# Patient Record
Sex: FEMALE | Race: BLACK OR AFRICAN AMERICAN | Employment: UNEMPLOYED | URBAN - METROPOLITAN AREA
[De-identification: names, ages, dates, MRNs, and addresses within clinical notes are randomized per-mention and may not be internally consistent; named-entity substitution may affect disease eponyms.]

---

## 2017-02-09 RX ORDER — METOPROLOL SUCCINATE 100 MG/1
100 TABLET, EXTENDED RELEASE ORAL DAILY
Qty: 30 TAB | Refills: 3 | Status: SHIPPED | OUTPATIENT
Start: 2017-02-09 | End: 2017-04-27 | Stop reason: SDUPTHER

## 2017-04-27 RX ORDER — METOPROLOL SUCCINATE 100 MG/1
TABLET, EXTENDED RELEASE ORAL
Qty: 30 TAB | Refills: 0 | Status: SHIPPED | OUTPATIENT
Start: 2017-04-27 | End: 2017-06-05 | Stop reason: SDUPTHER

## 2017-06-05 RX ORDER — METOPROLOL SUCCINATE 100 MG/1
100 TABLET, EXTENDED RELEASE ORAL DAILY
Qty: 30 TAB | Refills: 12 | Status: SHIPPED | OUTPATIENT
Start: 2017-06-05 | End: 2018-04-09 | Stop reason: ALTCHOICE

## 2017-10-02 VITALS
HEIGHT: 63 IN | DIASTOLIC BLOOD PRESSURE: 115 MMHG | RESPIRATION RATE: 18 BRPM | SYSTOLIC BLOOD PRESSURE: 151 MMHG | TEMPERATURE: 97.7 F | HEART RATE: 84 BPM | BODY MASS INDEX: 51.91 KG/M2 | OXYGEN SATURATION: 100 % | WEIGHT: 293 LBS

## 2017-10-02 LAB
APPEARANCE UR: ABNORMAL
BACTERIA URNS QL MICRO: ABNORMAL /HPF
BILIRUB UR QL: NEGATIVE
COLOR UR: ABNORMAL
EPITH CASTS URNS QL MICRO: ABNORMAL /LPF
GLUCOSE UR STRIP.AUTO-MCNC: NEGATIVE MG/DL
HCG UR QL: NEGATIVE
HGB UR QL STRIP: ABNORMAL
KETONES UR QL STRIP.AUTO: NEGATIVE MG/DL
LEUKOCYTE ESTERASE UR QL STRIP.AUTO: ABNORMAL
NITRITE UR QL STRIP.AUTO: POSITIVE
PH UR STRIP: 6 [PH] (ref 5–8)
PROT UR STRIP-MCNC: NEGATIVE MG/DL
RBC #/AREA URNS HPF: ABNORMAL /HPF (ref 0–5)
SP GR UR REFRACTOMETRY: 1.03 (ref 1–1.03)
UA: UC IF INDICATED,UAUC: ABNORMAL
UROBILINOGEN UR QL STRIP.AUTO: 1 EU/DL (ref 0.2–1)
WBC URNS QL MICRO: ABNORMAL /HPF (ref 0–4)

## 2017-10-02 PROCEDURE — 81025 URINE PREGNANCY TEST: CPT | Performed by: PHYSICIAN ASSISTANT

## 2017-10-02 PROCEDURE — 99284 EMERGENCY DEPT VISIT MOD MDM: CPT

## 2017-10-02 PROCEDURE — 87086 URINE CULTURE/COLONY COUNT: CPT | Performed by: PHYSICIAN ASSISTANT

## 2017-10-02 PROCEDURE — 87077 CULTURE AEROBIC IDENTIFY: CPT | Performed by: PHYSICIAN ASSISTANT

## 2017-10-02 PROCEDURE — 87186 SC STD MICRODIL/AGAR DIL: CPT | Performed by: PHYSICIAN ASSISTANT

## 2017-10-02 PROCEDURE — 81001 URINALYSIS AUTO W/SCOPE: CPT | Performed by: PHYSICIAN ASSISTANT

## 2017-10-03 ENCOUNTER — HOSPITAL ENCOUNTER (EMERGENCY)
Age: 32
Discharge: HOME | End: 2017-10-03
Attending: EMERGENCY MEDICINE
Payer: MEDICARE

## 2017-10-03 DIAGNOSIS — B96.89 BV (BACTERIAL VAGINOSIS): Primary | ICD-10-CM

## 2017-10-03 DIAGNOSIS — N30.01 ACUTE CYSTITIS WITH HEMATURIA: ICD-10-CM

## 2017-10-03 DIAGNOSIS — N76.0 BV (BACTERIAL VAGINOSIS): Primary | ICD-10-CM

## 2017-10-03 DIAGNOSIS — F17.200 NICOTINE DEPENDENCE, UNCOMPLICATED, UNSPECIFIED NICOTINE PRODUCT TYPE: ICD-10-CM

## 2017-10-03 LAB
CLUE CELLS VAG QL WET PREP: NORMAL
KOH PREP SPEC: NORMAL
SERVICE CMNT-IMP: NORMAL
T VAGINALIS VAG QL WET PREP: NORMAL

## 2017-10-03 PROCEDURE — 87491 CHLMYD TRACH DNA AMP PROBE: CPT | Performed by: PHYSICIAN ASSISTANT

## 2017-10-03 PROCEDURE — 87210 SMEAR WET MOUNT SALINE/INK: CPT | Performed by: PHYSICIAN ASSISTANT

## 2017-10-03 PROCEDURE — 74011250637 HC RX REV CODE- 250/637: Performed by: PHYSICIAN ASSISTANT

## 2017-10-03 PROCEDURE — 74011250637 HC RX REV CODE- 250/637

## 2017-10-03 RX ORDER — PHENAZOPYRIDINE HYDROCHLORIDE 200 MG/1
200 TABLET, FILM COATED ORAL 3 TIMES DAILY
Qty: 6 TAB | Refills: 0 | Status: SHIPPED | OUTPATIENT
Start: 2017-10-03 | End: 2017-10-05

## 2017-10-03 RX ORDER — PHENAZOPYRIDINE HYDROCHLORIDE 100 MG/1
TABLET, FILM COATED ORAL
Status: COMPLETED
Start: 2017-10-03 | End: 2017-10-03

## 2017-10-03 RX ORDER — METRONIDAZOLE 500 MG/1
500 TABLET ORAL 2 TIMES DAILY
Qty: 14 TAB | Refills: 0 | Status: SHIPPED | OUTPATIENT
Start: 2017-10-03 | End: 2018-05-01

## 2017-10-03 RX ORDER — METRONIDAZOLE 250 MG/1
500 TABLET ORAL
Status: COMPLETED | OUTPATIENT
Start: 2017-10-03 | End: 2017-10-03

## 2017-10-03 RX ORDER — CEPHALEXIN 250 MG/1
CAPSULE ORAL
Status: COMPLETED
Start: 2017-10-03 | End: 2017-10-03

## 2017-10-03 RX ORDER — CEPHALEXIN 500 MG/1
500 CAPSULE ORAL 2 TIMES DAILY
Qty: 10 CAP | Refills: 0 | Status: SHIPPED | OUTPATIENT
Start: 2017-10-03 | End: 2018-06-11 | Stop reason: ALTCHOICE

## 2017-10-03 RX ORDER — CEPHALEXIN 250 MG/1
500 CAPSULE ORAL
Status: COMPLETED | OUTPATIENT
Start: 2017-10-03 | End: 2017-10-03

## 2017-10-03 RX ORDER — PHENAZOPYRIDINE HYDROCHLORIDE 100 MG/1
200 TABLET, FILM COATED ORAL
Status: COMPLETED | OUTPATIENT
Start: 2017-10-03 | End: 2017-10-03

## 2017-10-03 RX ADMIN — PHENAZOPYRIDINE HYDROCHLORIDE 200 MG: 100 TABLET, FILM COATED ORAL at 01:22

## 2017-10-03 RX ADMIN — PHENAZOPYRIDINE HYDROCHLORIDE 200 MG: 100 TABLET ORAL at 01:22

## 2017-10-03 RX ADMIN — METRONIDAZOLE 500 MG: 250 TABLET ORAL at 01:41

## 2017-10-03 RX ADMIN — CEPHALEXIN 500 MG: 250 CAPSULE ORAL at 01:23
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
## 2017-10-03 NOTE — ED NOTES
Pt given discharge instructions by Zay Perrin. Discharged ambulatory with steady gait. No acute distress at time of discharge. Accompanied by female .

## 2017-10-04 LAB
C TRACH DNA SPEC QL NAA+PROBE: NEGATIVE
N GONORRHOEA DNA SPEC QL NAA+PROBE: NEGATIVE
SAMPLE TYPE: NORMAL
SERVICE CMNT-IMP: NORMAL
SPECIMEN SOURCE: NORMAL

## 2017-10-05 LAB
BACTERIA SPEC CULT: ABNORMAL
BACTERIA SPEC CULT: ABNORMAL
CC UR VC: ABNORMAL
SERVICE CMNT-IMP: ABNORMAL

## 2018-04-09 ENCOUNTER — OFFICE VISIT (OUTPATIENT)
Dept: CARDIOLOGY CLINIC | Age: 33
End: 2018-04-09

## 2018-04-09 VITALS
SYSTOLIC BLOOD PRESSURE: 197 MMHG | HEART RATE: 90 BPM | RESPIRATION RATE: 12 BRPM | HEIGHT: 63 IN | DIASTOLIC BLOOD PRESSURE: 114 MMHG | OXYGEN SATURATION: 98 % | WEIGHT: 293 LBS | BODY MASS INDEX: 51.91 KG/M2

## 2018-04-09 DIAGNOSIS — E87.6 CHRONIC HYPOKALEMIA: Primary | ICD-10-CM

## 2018-04-09 DIAGNOSIS — R00.2 PALPITATIONS: ICD-10-CM

## 2018-04-09 DIAGNOSIS — R42 LIGHTHEADEDNESS: ICD-10-CM

## 2018-04-09 DIAGNOSIS — E11.8 TYPE 2 DIABETES MELLITUS WITH COMPLICATION, UNSPECIFIED WHETHER LONG TERM INSULIN USE: ICD-10-CM

## 2018-04-09 DIAGNOSIS — I10 ESSENTIAL HYPERTENSION: ICD-10-CM

## 2018-04-09 DIAGNOSIS — R06.09 DOE (DYSPNEA ON EXERTION): ICD-10-CM

## 2018-04-09 RX ORDER — LABETALOL 200 MG/1
200 TABLET, FILM COATED ORAL 2 TIMES DAILY
Qty: 60 TAB | Refills: 1 | Status: SHIPPED | OUTPATIENT
Start: 2018-04-09 | End: 2018-04-10 | Stop reason: SDUPTHER

## 2018-04-09 NOTE — MR AVS SNAPSHOT
[address] [facility] 
 
 
 [address] [facility] 
[phone] Patient: Arlen Cheema MRN: [id number] :1985 Visit Information Date & Time Provider Department Dept. Phone Encounter #  
 2018  9:30 AM [facility] [place] [address] [id number] Upcoming Health Maintenance Date Due  
 FOOT EXAM Q1 3/28/1995 EYE EXAM RETINAL OR DILATED Q1 3/28/1995 DTaP/Tdap/Td series (2 - Tdap) 3/28/2006 PAP AKA CERVICAL CYTOLOGY 3/28/2006 MICROALBUMIN Q1 10/3/2013 HEMOGLOBIN A1C Q6M 2015 LIPID PANEL Q1 2016 Influenza Age 5 to Adult 2017 MEDICARE YEARLY EXAM 3/14/2018 Allergies as of 2018  Review Complete On: 2018 By: Edmundo Madera PA-C Severity Noted Reaction Type Reactions Aspirin High 2010   Side Effect Rash, Nausea and Vomiting, Myalgia Vicodin [Hydrocodone-acetaminophen] High 2013    Anaphylaxis Flexeril [Cyclobenzaprine] Medium 2010   Side Effect Nausea and Vomiting Ibuprofen  2012    Rash Ivp [Fd And C Blue No.1]  2010   Side Effect Itching Pcn [Penicillins]  2010    Rash Pt states she is allergic to all \"cillins\" Toradol [Ketorolac Tromethamine]  2010    Rash Ultram [Tramadol]  07/10/2014   Intolerance Nausea and Vomiting Pt reports headache with n/v when taking this med. Current Immunizations  Reviewed on 2015 Name Date Influenza Vaccine PF 2015  3:55 PM  
 Influenza Vaccine Whole 10/26/2009 Pneumococcal Polysaccharide (PPSV-23) 2015  3:54 PM  
 TD Vaccine 2004 Not reviewed this visit You Were Diagnosed With   
  
 Codes Comments Chronic hypokalemia    -  Primary ICD-10-CM: E87.6 ICD-9-CM: 276.8 Essential hypertension     ICD-10-CM: I10 
ICD-9-CM: 401.9 Type 2 diabetes mellitus with complication, unspecified whether long term insulin use (HCC)     ICD-10-CM: E11.8 ICD-9-CM: 250.90 Palpitations     ICD-10-CM: R00.2 ICD-9-CM: 785.1 Lightheadedness     ICD-10-CM: P55 ICD-9-CM: 780.4 LAMB (dyspnea on exertion)     ICD-10-CM: R06.09 
ICD-9-CM: 786.09 Vitals BP Pulse Resp Height(growth percentile) Weight(growth percentile) SpO2  
 (!) 197/114 (BP 1 Location: Right arm, BP Patient Position: Sitting) 90 12 5' 3\" (1.6 m) 318 lb (144.2 kg) 98% BMI OB Status Smoking Status 56.33 kg/m2 Having regular periods Current Every Day Smoker Vitals History BMI and BSA Data Body Mass Index Body Surface Area  
 56.33 kg/m 2 2.53 m 2 Preferred Pharmacy Pharmacy Name Phone [facility] [phone] Your Updated Medication List  
  
   
This list is accurate as of 4/9/18 10:40 AM.  Always use your most recent med list.  
  
  
  
  
 acyclovir 400 mg tablet Commonly known as:  ZOVIRAX Take 800 mg by mouth three (3) times daily. albuterol 90 mcg/actuation inhaler Commonly known as:  PROVENTIL HFA, VENTOLIN HFA, PROAIR HFA Take 1-2 puffs by inhalation every four (4) hours as needed for Wheezing. ALPRAZolam 2 mg tablet Commonly known as:  Laymond Carrie Take 0.5 tablets by mouth every twelve (12) hours as needed for Anxiety. cephALEXin 500 mg capsule Commonly known as:  Ralene Plum Take 1 Cap by mouth two (2) times a day. cloNIDine HCl 0.2 mg tablet Commonly known as:  CATAPRES Take 1 tablet by mouth three (3) times daily. fluticasone-salmeterol 100-50 mcg/dose diskus inhaler Commonly known as:  ADVAIR Take 1 puff by inhalation every twelve (12) hours. insulin aspart U-100 100 unit/mL Inpn Commonly known as:  Pj Peer Use as directed up to 4 times daily per sliding scale: 150-200: 2 units, 201-250: 4 units, 251-300: 6 units, 301-350: 8 units, 351-400: 10 units  
  
 ketoconazole 2 % topical cream  
Commonly known as:  NIZORAL Apply  to affected area daily. LEVEMIR U-100 INSULIN 100 unit/mL injection Generic drug:  insulin detemir U-100  
23 Units by SubCUTAneous route nightly. levETIRAcetam 100 mg/ml Soln oral solution Commonly known as:  KEPPRA Take 15 mL by mouth two (2) times a day. LIDODERM 5 % Generic drug:  lidocaine  
by TransDERmal route every twenty-four (24) hours. Apply patch to the affected area for 12 hours a day and remove for 12 hours a day. loratadine 10 mg Cap Take  by mouth daily. metoprolol succinate 100 mg tablet Commonly known as:  TOPROL-XL Take 1 Tab by mouth daily. metroNIDAZOLE 500 mg tablet Commonly known as:  FLAGYL Take 1 Tab by mouth two (2) times a day. MULTI VITAMIN PO Take  by mouth. ondansetron 4 mg disintegrating tablet Commonly known as:  ZOFRAN ODT Take 1 Tab by mouth every eight (8) hours as needed for Nausea for up to 5 doses. promethazine 25 mg tablet Commonly known as:  PHENERGAN Take 1 Tab by mouth every six (6) hours as needed. We Performed the Following AMB POC EKG ROUTINE W/ 12 LEADS, INTER & REP [87243 CPT(R)] To-Do List   
 04/09/2018 ECHO:  2D ECHO COMPLETE ADULT (TTE) W OR WO CONTR   
  
 04/09/2018 ECG:  ECG EVENT RECORD MONITORING SET-UP Patient Instructions   
-- We will get an echo and event monitor -- Please make a follow up appointment for 5 weeks Cardiac Event Monitoring: About This Test 
What is it? Cardiac event monitoring is a test that records the electrical activity of your heart. The test is done with a heart monitor device that you may wear or keep with you for up to a month. This test may be done to find out why you're having symptoms.  Or it may be done to look for heartbeats that are too fast, too slow, or irregular. These are cardiac events. The monitor will give your doctor information similar to an electrocardiogram (EKG or ECG). An EKG translates the heart's electrical activity into line tracings on paper. There are different types of monitors. Your doctor will choose the type that works best for you and is most likely to help diagnose your heart problem. These monitors are safe to use. No electricity is sent through your body. So there is no chance of getting an electric shock. Why is this test done? This test is used to look for irregular heartbeats. It can help your doctor find out what is causing chest pain, fainting, lightheadedness, or other symptoms of heart problems. It also can help the doctor check to see if treatment for an irregular heartbeat is working. Many people have irregular heartbeats from time to time. Because these kinds of heartbeats can come and go, it may be hard to record one while you are in the doctor's office. Monitoring your heart for a longer time and during your normal activities makes it easier to capture your cardiac events. What happens before the test? 
If you are getting a monitor with electrode pads on your chest: 
· Several areas on your chest may be shaved and cleaned. Then a small amount of gel will be put on those areas. The electrode pads will then be attached to the skin of your chest. Thin wires will connect the electrodes to the monitor. · You will get instructions for how and when to change the electrodes at home. Some types of monitors don't use electrode pads. Some types are worn on your wrist like a watch. Others are stuck to your chest with a sticky patch. Or you may have a monitor that you carry with you. Your doctor will explain which type of monitor you have and how to use it.  
What happens during the test? 
· Your monitor may start recording on its own when it detects an abnormal heartbeat. Or you may need to do something to start the recording when you have symptoms. You might use a handheld device to start the monitor. Or you may need to press a button on the monitor itself. Your doctor will explain which type you have and what you need to do. · You may keep a diary of what you were doing when you had symptoms such as chest pain or dizziness. Your doctor will show you how to do this. · You may need to send the information from your monitor to your doctor through a phone line or online. Some monitors send it automatically. You will get instructions from your doctor. Your information will stay private and secure whether you send it or it is sent automatically. · You may be able to do most of the things you usually do. But it's important to follow your doctor's instructions for bathing, exercise, and other daily activities. How long does the test take? You may use the monitor for up to a month or longer. It depends on how long it takes to record irregular heartbeat episodes. It also depends on how long your doctor wants to keep monitoring your heart. What happens after the test? 
· Lynann Bolds return the monitor to your doctor's office or hospital. 
· You'll meet with your doctor to talk about the information recorded during your test. 
· Your doctor will check your diary of symptoms. He or she will compare the timing of your activities and symptoms with the recorded heart pattern. · Depending on your test results, your doctor may talk with you about other tests or treatment options. Follow-up care is a key part of your treatment and safety. Be sure to make and go to all appointments, and call your doctor if you are having problems. It's also a good idea to keep a list of the medicines you take. Ask your doctor when you can expect to have your test results. Where can you learn more? Go to http://emelina-nile.info/. Enter S924 in the search box to learn more about \"Cardiac Event Monitoring: About This Test.\" Current as of: September 21, 2016 Content Version: 11.4 © 6284-1881 Healthwise, Incorporated. Care instructions adapted under license by [employer] (which disclaims liability or warranty for this information). If you have questions about a medical condition or this instruction, always ask your healthcare professional. [address] any warranty or liability for your use of this information. Introducing [facility]! Tatum Snell introduces [employer] patient portal. Now you can access parts of your medical record, email your doctor's office, and request medication refills online. 1. In your internet browser, go to [email or web address] 2. Click on the First Time User? Click Here link in the Sign In box. You will see the New Member Sign Up page. 3. Enter your [employer] Access Code exactly as it appears below. You will not need to use this code after youve completed the sign-up process. If you do not sign up before the expiration date, you must request a new code. · [employer] Access Code: [email or web address] Expires: 7/8/2018 10:39 AM 
 
4. Enter the last four digits of your Social Security Number (xxxx) and Date of Birth (mm/dd/yyyy) as indicated and click Submit. You will be taken to the next sign-up page. 5. Create a [employer] ID. This will be your [employer] login ID and cannot be changed, so think of one that is secure and easy to remember. 6. Create a [employer] password. You can change your password at any time. 7. Enter your Password Reset Question and Answer. This can be used at a later time if you forget your password. 8. Enter your e-mail address. You will receive e-mail notification when new information is available in [address] 9. Click Sign Up. You can now view and download portions of your medical record. 10. Click the Download Summary menu link to download a portable copy of your medical information. If you have questions, please visit the Frequently Asked Questions section of the [employer] website. Remember, [employer] is NOT to be used for urgent needs. For medical emergencies, dial 911. Now available from your iPhone and Android! Please provide this summary of care documentation to your next provider. Your primary care clinician is listed as Nick Moran. If you have any questions after today's visit, please call [phone].

## 2018-04-09 NOTE — PATIENT INSTRUCTIONS
-- We will get an echo and event monitor  -- Please make a follow up appointment for 5 weeks    Cardiac Event Monitoring: About This Test  What is it? Cardiac event monitoring is a test that records the electrical activity of your heart. The test is done with a heart monitor device that you may wear or keep with you for up to a month. This test may be done to find out why you're having symptoms. Or it may be done to look for heartbeats that are too fast, too slow, or irregular. These are cardiac events. The monitor will give your doctor information similar to an electrocardiogram (EKG or ECG). An EKG translates the heart's electrical activity into line tracings on paper. There are different types of monitors. Your doctor will choose the type that works best for you and is most likely to help diagnose your heart problem. These monitors are safe to use. No electricity is sent through your body. So there is no chance of getting an electric shock. Why is this test done? This test is used to look for irregular heartbeats. It can help your doctor find out what is causing chest pain, fainting, lightheadedness, or other symptoms of heart problems. It also can help the doctor check to see if treatment for an irregular heartbeat is working. Many people have irregular heartbeats from time to time. Because these kinds of heartbeats can come and go, it may be hard to record one while you are in the doctor's office. Monitoring your heart for a longer time and during your normal activities makes it easier to capture your cardiac events. What happens before the test?  If you are getting a monitor with electrode pads on your chest:  · Several areas on your chest may be shaved and cleaned. Then a small amount of gel will be put on those areas. The electrode pads will then be attached to the skin of your chest. Thin wires will connect the electrodes to the monitor.   · You will get instructions for how and when to change the electrodes at home. Some types of monitors don't use electrode pads. Some types are worn on your wrist like a watch. Others are stuck to your chest with a sticky patch. Or you may have a monitor that you carry with you. Your doctor will explain which type of monitor you have and how to use it. What happens during the test?  · Your monitor may start recording on its own when it detects an abnormal heartbeat. Or you may need to do something to start the recording when you have symptoms. You might use a handheld device to start the monitor. Or you may need to press a button on the monitor itself. Your doctor will explain which type you have and what you need to do. · You may keep a diary of what you were doing when you had symptoms such as chest pain or dizziness. Your doctor will show you how to do this. · You may need to send the information from your monitor to your doctor through a phone line or online. Some monitors send it automatically. You will get instructions from your doctor. Your information will stay private and secure whether you send it or it is sent automatically. · You may be able to do most of the things you usually do. But it's important to follow your doctor's instructions for bathing, exercise, and other daily activities. How long does the test take? You may use the monitor for up to a month or longer. It depends on how long it takes to record irregular heartbeat episodes. It also depends on how long your doctor wants to keep monitoring your heart. What happens after the test?  · Joyce Ferguson return the monitor to your doctor's office or hospital.  · You'll meet with your doctor to talk about the information recorded during your test.  · Your doctor will check your diary of symptoms. He or she will compare the timing of your activities and symptoms with the recorded heart pattern. · Depending on your test results, your doctor may talk with you about other tests or treatment options.   Follow-up care is a key part of your treatment and safety. Be sure to make and go to all appointments, and call your doctor if you are having problems. It's also a good idea to keep a list of the medicines you take. Ask your doctor when you can expect to have your test results. Where can you learn more? Go to http://emelina-nile.info/. Enter B668 in the search box to learn more about \"Cardiac Event Monitoring: About This Test.\"  Current as of: September 21, 2016  Content Version: 11.4  © 2419-9015 [employer]. Care instructions adapted under license by [employer] (which disclaims liability or warranty for this information). If you have questions about a medical condition or this instruction, always ask your healthcare professional. [address] any warranty or liability for your use of this information.
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
## 2018-04-09 NOTE — PROGRESS NOTES
Chief Complaint   Patient presents with    Chest Pain (Angina)     pt c/o flutters in chest , lethargy     1. Have you been to the ER, urgent care clinic since your last visit? Hospitalized since your last visit?10/2017 abdomen pain    2. Have you seen or consulted any other health care providers outside of the [address] [place] [address] since your last visit? Include any pap smears or colon screening.  Yes When: psych md. Lilliam Elliott; mental health3/18

## 2018-04-10 ENCOUNTER — TELEPHONE (OUTPATIENT)
Dept: CARDIOLOGY CLINIC | Age: 33
End: 2018-04-10

## 2018-04-10 DIAGNOSIS — R00.2 PALPITATIONS: ICD-10-CM

## 2018-04-10 DIAGNOSIS — I10 ESSENTIAL HYPERTENSION: ICD-10-CM

## 2018-04-10 NOTE — TELEPHONE ENCOUNTER
Spoke with pt . Verified 2 identifers. Told pt per Veronica Cardona :stop the Metoprolol and start Labetalol as soon as she can order has been sent to pharmacy so she can  new medication. keep track of her blood pressures and write them down over the next few weeks, before she comes back for follow up, either at home (if she has a blood pressure cuff) or by going into a pharmacy a few times each week, that would be great, bring all her meds and this blood pressure diary with her to the next visit and also to have lab work done at [facility] and orders have been put in system. Patient verbalize understanding .

## 2018-04-10 NOTE — TELEPHONE ENCOUNTER
Called pt to relay message per [facility] pt number disconnected. Called emergency contact number no answer unable to leave voicemail. Spoke with 3rd contact on chart father Basilio Mantilla states he will give her the number to office to return the call .

## 2018-04-11 RX ORDER — LABETALOL 200 MG/1
200 TABLET, FILM COATED ORAL 2 TIMES DAILY
Qty: 60 TAB | Refills: 1 | Status: SHIPPED | OUTPATIENT
Start: 2018-04-11 | End: 2018-12-07

## 2018-04-17 ENCOUNTER — HOSPITAL ENCOUNTER (OUTPATIENT)
Dept: NON INVASIVE DIAGNOSTICS | Age: 33
Discharge: HOME | End: 2018-04-17
Attending: PHYSICIAN ASSISTANT
Payer: MEDICARE

## 2018-04-17 DIAGNOSIS — R42 LIGHTHEADEDNESS: ICD-10-CM

## 2018-04-17 DIAGNOSIS — R00.2 PALPITATIONS: ICD-10-CM

## 2018-04-17 DIAGNOSIS — R06.09 DOE (DYSPNEA ON EXERTION): ICD-10-CM

## 2018-04-17 PROCEDURE — 93270 REMOTE 30 DAY ECG REV/REPORT: CPT

## 2018-04-17 PROCEDURE — C8929 TTE W OR WO FOL WCON,DOPPLER: HCPCS

## 2018-04-17 PROCEDURE — 74011250636 HC RX REV CODE- 250/636

## 2018-04-17 RX ORDER — SODIUM CHLORIDE 0.9 % (FLUSH) 0.9 %
SYRINGE (ML) INJECTION
Status: DISPENSED
Start: 2018-04-17 | End: 2018-04-18

## 2018-04-17 RX ORDER — SODIUM CHLORIDE 0.9 % (FLUSH) 0.9 %
10 SYRINGE (ML) INJECTION AS NEEDED
Status: DISCONTINUED | OUTPATIENT
Start: 2018-04-17 | End: 2018-04-21 | Stop reason: HOSPADM

## 2018-04-17 RX ADMIN — Medication 10 ML: at 14:52

## 2018-04-17 RX ADMIN — Medication 10 ML: at 14:49

## 2018-04-17 RX ADMIN — PERFLUTREN 1.5 ML: 6.52 INJECTION, SUSPENSION INTRAVENOUS at 14:43

## 2018-04-17 RX ADMIN — Medication 10 ML: at 14:51

## 2018-04-17 NOTE — PROGRESS NOTES
Pt given instructions for 30 day event monitor. Noted to return on may 17/2018. Extra supplies given and no further questions noted two transmission sent for baseline. Instruction to go to nearest er if chest pain. nack and soda given

## 2018-04-17 NOTE — PROGRESS NOTES
1.5 ml iv definity after iv started per er tech Jose M Dasilva after two failed attempts by rn. Iv start in left hand site. Lab orders faxed over from Dr Lundy Manifold office given to pt. Iv discontinued tip intact . dsg placed. Pt to lab.

## 2018-04-18 LAB
ALBUMIN SERPL-MCNC: 4.3 G/DL (ref 3.5–5.5)
ALBUMIN/GLOB SERPL: 1.1 {RATIO} (ref 1.2–2.2)
ALP SERPL-CCNC: 89 IU/L (ref 39–117)
ALT SERPL-CCNC: 18 IU/L (ref 0–32)
AST SERPL-CCNC: 12 IU/L (ref 0–40)
BASOPHILS # BLD AUTO: 0 X10E3/UL (ref 0–0.2)
BASOPHILS NFR BLD AUTO: 0 %
BILIRUB SERPL-MCNC: 0.3 MG/DL (ref 0–1.2)
BUN SERPL-MCNC: 9 MG/DL (ref 6–20)
BUN/CREAT SERPL: 15 (ref 9–23)
CALCIUM SERPL-MCNC: 9.5 MG/DL (ref 8.7–10.2)
CHLORIDE SERPL-SCNC: 96 MMOL/L (ref 96–106)
CO2 SERPL-SCNC: 25 MMOL/L (ref 18–29)
CREAT SERPL-MCNC: 0.6 MG/DL (ref 0.57–1)
EOSINOPHIL # BLD AUTO: 0.2 X10E3/UL (ref 0–0.4)
EOSINOPHIL NFR BLD AUTO: 3 %
ERYTHROCYTE [DISTWIDTH] IN BLOOD BY AUTOMATED COUNT: 17.1 % (ref 12.3–15.4)
FT4I SERPL CALC-MCNC: 2 (ref 1.2–4.9)
GFR SERPLBLD CREATININE-BSD FMLA CKD-EPI: 120 ML/MIN/1.73
GFR SERPLBLD CREATININE-BSD FMLA CKD-EPI: 139 ML/MIN/1.73
GLOBULIN SER CALC-MCNC: 3.9 G/DL (ref 1.5–4.5)
GLUCOSE SERPL-MCNC: 128 MG/DL (ref 65–99)
HCT VFR BLD AUTO: 35.7 % (ref 34–46.6)
HGB BLD-MCNC: 11.9 G/DL (ref 11.1–15.9)
IMM GRANULOCYTES # BLD: 0 X10E3/UL (ref 0–0.1)
IMM GRANULOCYTES NFR BLD: 0 %
LYMPHOCYTES # BLD AUTO: 1.8 X10E3/UL (ref 0.7–3.1)
LYMPHOCYTES NFR BLD AUTO: 23 %
MAGNESIUM SERPL-MCNC: 2 MG/DL (ref 1.6–2.3)
MCH RBC QN AUTO: 25.9 PG (ref 26.6–33)
MCHC RBC AUTO-ENTMCNC: 33.3 G/DL (ref 31.5–35.7)
MCV RBC AUTO: 78 FL (ref 79–97)
MONOCYTES # BLD AUTO: 0.6 X10E3/UL (ref 0.1–0.9)
MONOCYTES NFR BLD AUTO: 7 %
NEUTROPHILS # BLD AUTO: 5 X10E3/UL (ref 1.4–7)
NEUTROPHILS NFR BLD AUTO: 67 %
PLATELET # BLD AUTO: 374 X10E3/UL (ref 150–379)
POTASSIUM SERPL-SCNC: 4 MMOL/L (ref 3.5–5.2)
PROT SERPL-MCNC: 8.2 G/DL (ref 6–8.5)
RBC # BLD AUTO: 4.59 X10E6/UL (ref 3.77–5.28)
SODIUM SERPL-SCNC: 141 MMOL/L (ref 134–144)
T3RU NFR SERPL: 27 % (ref 24–39)
T4 SERPL-MCNC: 7.5 UG/DL (ref 4.5–12)
TSH SERPL DL<=0.005 MIU/L-ACNC: 1.3 UIU/ML (ref 0.45–4.5)
WBC # BLD AUTO: 7.6 X10E3/UL (ref 3.4–10.8)

## 2018-04-19 NOTE — PROGRESS NOTES
Spoke with pt . Verified 2 identifers. Told pt per Veronica Cardona :  Labs look good from recent bloodwork drawn Patient verbalize understanding .

## 2018-04-30 NOTE — PROGRESS NOTES
Echo shows changes related to poorly controlled hypertension. Pt should take her blood pressure medications, avoid salt/sodium in her diet, eat a heart healthy diet and get daily exercise. We will see her for follow up as planned.

## 2018-05-01 ENCOUNTER — HOSPITAL ENCOUNTER (EMERGENCY)
Age: 33
Discharge: HOME | End: 2018-05-01
Attending: EMERGENCY MEDICINE
Payer: MEDICARE

## 2018-05-01 ENCOUNTER — APPOINTMENT (OUTPATIENT)
Dept: GENERAL RADIOLOGY | Age: 33
End: 2018-05-01
Attending: PHYSICIAN ASSISTANT
Payer: MEDICARE

## 2018-05-01 ENCOUNTER — TELEPHONE (OUTPATIENT)
Dept: CARDIOLOGY CLINIC | Age: 33
End: 2018-05-01

## 2018-05-01 VITALS
TEMPERATURE: 98 F | WEIGHT: 293 LBS | HEART RATE: 87 BPM | BODY MASS INDEX: 51.91 KG/M2 | DIASTOLIC BLOOD PRESSURE: 92 MMHG | SYSTOLIC BLOOD PRESSURE: 169 MMHG | OXYGEN SATURATION: 94 % | HEIGHT: 63 IN | RESPIRATION RATE: 9 BRPM

## 2018-05-01 DIAGNOSIS — R07.9 CHEST PAIN, UNSPECIFIED TYPE: Primary | ICD-10-CM

## 2018-05-01 LAB
ALBUMIN SERPL-MCNC: 3.4 G/DL (ref 3.5–5)
ALBUMIN/GLOB SERPL: 0.6 {RATIO} (ref 1.1–2.2)
ALP SERPL-CCNC: 91 U/L (ref 45–117)
ALT SERPL-CCNC: 35 U/L (ref 12–78)
AMPHET UR QL SCN: NEGATIVE
ANION GAP SERPL CALC-SCNC: 9 MMOL/L (ref 5–15)
APPEARANCE UR: CLEAR
AST SERPL-CCNC: 22 U/L (ref 15–37)
ATRIAL RATE: 84 BPM
BACTERIA URNS QL MICRO: NEGATIVE /HPF
BARBITURATES UR QL SCN: NEGATIVE
BASOPHILS # BLD: 0 K/UL (ref 0–0.1)
BASOPHILS NFR BLD: 0 % (ref 0–1)
BENZODIAZ UR QL: NEGATIVE
BILIRUB SERPL-MCNC: 0.2 MG/DL (ref 0.2–1)
BILIRUB UR QL: NEGATIVE
BNP SERPL-MCNC: 11 PG/ML (ref 0–125)
BUN SERPL-MCNC: 9 MG/DL (ref 6–20)
BUN/CREAT SERPL: 11 (ref 12–20)
CALCIUM SERPL-MCNC: 9 MG/DL (ref 8.5–10.1)
CALCULATED P AXIS, ECG09: 30 DEGREES
CALCULATED R AXIS, ECG10: 2 DEGREES
CALCULATED T AXIS, ECG11: -4 DEGREES
CANNABINOIDS UR QL SCN: POSITIVE
CHLORIDE SERPL-SCNC: 99 MMOL/L (ref 97–108)
CK MB CFR SERPL CALC: NORMAL % (ref 0–2.5)
CK MB SERPL-MCNC: <1 NG/ML (ref 5–25)
CK SERPL-CCNC: 124 U/L (ref 26–192)
CO2 SERPL-SCNC: 29 MMOL/L (ref 21–32)
COCAINE UR QL SCN: NEGATIVE
COLOR UR: ABNORMAL
CREAT SERPL-MCNC: 0.8 MG/DL (ref 0.55–1.02)
D DIMER PPP FEU-MCNC: 0.3 MG/L FEU (ref 0–0.65)
DIAGNOSIS, 93000: NORMAL
DIFFERENTIAL METHOD BLD: ABNORMAL
DRUG SCRN COMMENT,DRGCM: ABNORMAL
EOSINOPHIL # BLD: 0.1 K/UL (ref 0–0.4)
EOSINOPHIL NFR BLD: 2 % (ref 0–7)
EPITH CASTS URNS QL MICRO: ABNORMAL /LPF
ERYTHROCYTE [DISTWIDTH] IN BLOOD BY AUTOMATED COUNT: 16.2 % (ref 11.5–14.5)
GLOBULIN SER CALC-MCNC: 5.3 G/DL (ref 2–4)
GLUCOSE SERPL-MCNC: 99 MG/DL (ref 65–100)
GLUCOSE UR STRIP.AUTO-MCNC: NEGATIVE MG/DL
HCT VFR BLD AUTO: 35.1 % (ref 35–47)
HGB BLD-MCNC: 11.2 G/DL (ref 11.5–16)
HGB UR QL STRIP: ABNORMAL
IMM GRANULOCYTES # BLD: 0 K/UL (ref 0–0.04)
IMM GRANULOCYTES NFR BLD AUTO: 0 % (ref 0–0.5)
KETONES UR QL STRIP.AUTO: NEGATIVE MG/DL
LEUKOCYTE ESTERASE UR QL STRIP.AUTO: NEGATIVE
LYMPHOCYTES # BLD: 1.8 K/UL (ref 0.8–3.5)
LYMPHOCYTES NFR BLD: 27 % (ref 12–49)
MCH RBC QN AUTO: 25 PG (ref 26–34)
MCHC RBC AUTO-ENTMCNC: 31.9 G/DL (ref 30–36.5)
MCV RBC AUTO: 78.3 FL (ref 80–99)
METHADONE UR QL: NEGATIVE
MONOCYTES # BLD: 0.5 K/UL (ref 0–1)
MONOCYTES NFR BLD: 7 % (ref 5–13)
NEUTS SEG # BLD: 4.3 K/UL (ref 1.8–8)
NEUTS SEG NFR BLD: 63 % (ref 32–75)
NITRITE UR QL STRIP.AUTO: NEGATIVE
NRBC # BLD: 0 K/UL (ref 0–0.01)
NRBC BLD-RTO: 0 PER 100 WBC
OPIATES UR QL: NEGATIVE
P-R INTERVAL, ECG05: 144 MS
PCP UR QL: NEGATIVE
PH UR STRIP: 6.5 [PH] (ref 5–8)
PLATELET # BLD AUTO: 376 K/UL (ref 150–400)
PMV BLD AUTO: 9.1 FL (ref 8.9–12.9)
POTASSIUM SERPL-SCNC: 3.7 MMOL/L (ref 3.5–5.1)
PROT SERPL-MCNC: 8.7 G/DL (ref 6.4–8.2)
PROT UR STRIP-MCNC: NEGATIVE MG/DL
Q-T INTERVAL, ECG07: 352 MS
QRS DURATION, ECG06: 82 MS
QTC CALCULATION (BEZET), ECG08: 415 MS
RBC # BLD AUTO: 4.48 M/UL (ref 3.8–5.2)
RBC #/AREA URNS HPF: ABNORMAL /HPF (ref 0–5)
SODIUM SERPL-SCNC: 137 MMOL/L (ref 136–145)
SP GR UR REFRACTOMETRY: 1.01 (ref 1–1.03)
TROPONIN I SERPL-MCNC: <0.04 NG/ML
UA: UC IF INDICATED,UAUC: ABNORMAL
UROBILINOGEN UR QL STRIP.AUTO: 0.2 EU/DL (ref 0.2–1)
VENTRICULAR RATE, ECG03: 84 BPM
WBC # BLD AUTO: 6.8 K/UL (ref 3.6–11)
WBC URNS QL MICRO: ABNORMAL /HPF (ref 0–4)

## 2018-05-01 PROCEDURE — 71045 X-RAY EXAM CHEST 1 VIEW: CPT

## 2018-05-01 PROCEDURE — 80307 DRUG TEST PRSMV CHEM ANLYZR: CPT | Performed by: PHYSICIAN ASSISTANT

## 2018-05-01 PROCEDURE — 36415 COLL VENOUS BLD VENIPUNCTURE: CPT | Performed by: PHYSICIAN ASSISTANT

## 2018-05-01 PROCEDURE — 83880 ASSAY OF NATRIURETIC PEPTIDE: CPT | Performed by: PHYSICIAN ASSISTANT

## 2018-05-01 PROCEDURE — 82550 ASSAY OF CK (CPK): CPT | Performed by: PHYSICIAN ASSISTANT

## 2018-05-01 PROCEDURE — 84484 ASSAY OF TROPONIN QUANT: CPT | Performed by: PHYSICIAN ASSISTANT

## 2018-05-01 PROCEDURE — 81001 URINALYSIS AUTO W/SCOPE: CPT | Performed by: PHYSICIAN ASSISTANT

## 2018-05-01 PROCEDURE — 80053 COMPREHEN METABOLIC PANEL: CPT | Performed by: PHYSICIAN ASSISTANT

## 2018-05-01 PROCEDURE — 99285 EMERGENCY DEPT VISIT HI MDM: CPT

## 2018-05-01 PROCEDURE — 74011250637 HC RX REV CODE- 250/637: Performed by: PHYSICIAN ASSISTANT

## 2018-05-01 PROCEDURE — 93005 ELECTROCARDIOGRAM TRACING: CPT

## 2018-05-01 PROCEDURE — 85025 COMPLETE CBC W/AUTO DIFF WBC: CPT | Performed by: PHYSICIAN ASSISTANT

## 2018-05-01 PROCEDURE — 85379 FIBRIN DEGRADATION QUANT: CPT | Performed by: PHYSICIAN ASSISTANT

## 2018-05-01 RX ORDER — ACETAMINOPHEN 500 MG
1000 TABLET ORAL
Status: COMPLETED | OUTPATIENT
Start: 2018-05-01 | End: 2018-05-01

## 2018-05-01 RX ORDER — DIPHENHYDRAMINE HCL 25 MG
25 CAPSULE ORAL
Status: DISCONTINUED | OUTPATIENT
Start: 2018-05-01 | End: 2018-05-01

## 2018-05-01 RX ORDER — HYDROCHLOROTHIAZIDE 12.5 MG/1
25 CAPSULE ORAL DAILY
COMMUNITY
End: 2019-03-12 | Stop reason: SDUPTHER

## 2018-05-01 RX ORDER — SODIUM CHLORIDE 0.9 % (FLUSH) 0.9 %
5-10 SYRINGE (ML) INJECTION EVERY 8 HOURS
Status: DISCONTINUED | OUTPATIENT
Start: 2018-05-01 | End: 2018-05-01 | Stop reason: HOSPADM

## 2018-05-01 RX ORDER — SODIUM CHLORIDE 0.9 % (FLUSH) 0.9 %
5-10 SYRINGE (ML) INJECTION AS NEEDED
Status: DISCONTINUED | OUTPATIENT
Start: 2018-05-01 | End: 2018-05-01 | Stop reason: HOSPADM

## 2018-05-01 RX ORDER — KETOROLAC TROMETHAMINE 30 MG/ML
30 INJECTION, SOLUTION INTRAMUSCULAR; INTRAVENOUS
Status: DISCONTINUED | OUTPATIENT
Start: 2018-05-01 | End: 2018-05-01

## 2018-05-01 RX ADMIN — ACETAMINOPHEN 1000 MG: 500 TABLET, FILM COATED ORAL at 16:37

## 2018-05-01 NOTE — LETTER
[facility] EMERGENCY DEPT 
[address] [id number] 
[phone] Work/School Note Date: 5/1/2018 To Whom It May concern: Saige Thomason was seen and treated today in the emergency room by the following provider(s): 
Attending Provider: Monty Louis MD 
Physician Assistant: ZORA Nichols. Saige Thomason may return to work on 5/2/2018. Sincerely, ZORA Nichols

## 2018-05-01 NOTE — PROGRESS NOTES
Cardiology: The cardiac monitor that the patient is carrying is a patient triggered event recorder. She is supposed to be triggering recordings for every symptom. Nothing she has transmitted so far implies any risk at all. She has hypertensive heart disease at an early age because of inconsistent compliance with treatment of early onset hypertension. Her EKG today does not suggest an acute event. We will assess.      Joceline Forman MD

## 2018-05-01 NOTE — PROGRESS NOTES
Spoke with pt . Verified 2 identifers. Told pt per Veronica Cardona :Echo shows changes related to poorly controlled hypertension. Pt should take her blood pressure medications, avoid salt/sodium in her diet, eat a heart healthy diet and get daily exercise. We will see her for follow up as planned.  Patient verbalize understanding .
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
## 2018-05-01 NOTE — TELEPHONE ENCOUNTER
Spoke with pt . Verified 2 identifers. Pt states c/o flutters in chest ,jittery in head, eyes twitching. pt states she took hydrochlorothiazide 12.5 mg at 6 am 1 tab and 2 asa 81mg at 10:20am. Told pt I would forward to provider for a response Patient verbalize understanding .

## 2018-05-01 NOTE — DISCHARGE INSTRUCTIONS
Chest Pain: Care Instructions  Your Care Instructions    There are many things that can cause chest pain. Some are not serious and will get better on their own in a few days. But some kinds of chest pain need more testing and treatment. Your doctor may have recommended a follow-up visit in the next 8 to 12 hours. If you are not getting better, you may need more tests or treatment. Even though your doctor has released you, you still need to watch for any problems. The doctor carefully checked you, but sometimes problems can develop later. If you have new symptoms or if your symptoms do not get better, get medical care right away. If you have worse or different chest pain or pressure that lasts more than 5 minutes or you passed out (lost consciousness), call 911 or seek other emergency help right away. A medical visit is only one step in your treatment. Even if you feel better, you still need to do what your doctor recommends, such as going to all suggested follow-up appointments and taking medicines exactly as directed. This will help you recover and help prevent future problems. How can you care for yourself at home? · Rest until you feel better. · Take your medicine exactly as prescribed. Call your doctor if you think you are having a problem with your medicine. · Do not drive after taking a prescription pain medicine. When should you call for help? Call 911 if:  ? · You passed out (lost consciousness). ? · You have severe difficulty breathing. ? · You have symptoms of a heart attack. These may include:  ¨ Chest pain or pressure, or a strange feeling in your chest.  ¨ Sweating. ¨ Shortness of breath. ¨ Nausea or vomiting. ¨ Pain, pressure, or a strange feeling in your back, neck, jaw, or upper belly or in one or both shoulders or arms. ¨ Lightheadedness or sudden weakness. ¨ A fast or irregular heartbeat.   After you call 911, the  may tell you to chew 1 adult-strength or 2 to 4 low-dose aspirin. Wait for an ambulance. Do not try to drive yourself. ?Call your doctor today if:  ? · You have any trouble breathing. ? · Your chest pain gets worse. ? · You are dizzy or lightheaded, or you feel like you may faint. ? · You are not getting better as expected. ? · You are having new or different chest pain. Where can you learn more? Go to http://emelina-nile.info/. Enter A120 in the search box to learn more about \"Chest Pain: Care Instructions. \"  Current as of: March 20, 2017  Content Version: 11.4  © 2432-0481 [employer]. Care instructions adapted under license by [employer] (which disclaims liability or warranty for this information). If you have questions about a medical condition or this instruction, always ask your healthcare professional. [address] any warranty or liability for your use of this information.
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
## 2018-05-01 NOTE — ED NOTES
Pt presents to the ED with c/o chest pain x2 days. Pt has a halter monitor on per Dr. Hammad Villar. Pt reports feeling \"dizzy, pressure, and heat in her head. \" Pt reports a \"throbbing sensation in her head like a headache and reports photophobia\". Pt reports sharp chest pain in the middle of her chest. Pt reports the pain radiates to her back. Pt denies a cough. Pt reports palpitations and SOB. Pt denies n/v/d. Pt is alert and oriented. Pt skin is warm and dry. Pt is speaking in full sentences. Emergency Department Nursing Plan of Care       The Nursing Plan of Care is developed from the Nursing assessment and Emergency Department Attending provider initial evaluation. The plan of care may be reviewed in the ED Provider note.     The Plan of Care was developed with the following considerations:   Patient / Family readiness to learn indicated by:verbalized understanding  Persons(s) to be included in education: patient  Barriers to Learning/Limitations:No    Signed     Rosalia Garcia    5/1/2018   1:05 PM

## 2018-05-02 ENCOUNTER — DOCUMENTATION ONLY (OUTPATIENT)
Dept: CARDIOLOGY CLINIC | Age: 33
End: 2018-05-02

## 2018-05-02 NOTE — PROGRESS NOTES
Roni Castelan was assessed in the emergency room in detail, in company with Ida Kelley.  Full consultation is placed in the appropriate chart.

## 2018-05-14 ENCOUNTER — OFFICE VISIT (OUTPATIENT)
Dept: CARDIOLOGY CLINIC | Age: 33
End: 2018-05-14

## 2018-05-14 VITALS
WEIGHT: 293 LBS | HEIGHT: 63 IN | DIASTOLIC BLOOD PRESSURE: 107 MMHG | HEART RATE: 94 BPM | RESPIRATION RATE: 12 BRPM | SYSTOLIC BLOOD PRESSURE: 172 MMHG | OXYGEN SATURATION: 99 % | BODY MASS INDEX: 51.91 KG/M2

## 2018-05-14 DIAGNOSIS — F31.9 BIPOLAR 1 DISORDER (HCC): ICD-10-CM

## 2018-05-14 DIAGNOSIS — I10 ESSENTIAL HYPERTENSION: Primary | ICD-10-CM

## 2018-05-14 DIAGNOSIS — E66.01 OBESITY, MORBID (HCC): ICD-10-CM

## 2018-05-14 DIAGNOSIS — E11.8 TYPE 2 DIABETES MELLITUS WITH COMPLICATION, UNSPECIFIED WHETHER LONG TERM INSULIN USE: ICD-10-CM

## 2018-05-14 NOTE — PROGRESS NOTES
Chief Complaint   Patient presents with    Chest Pain (Angina)     pt c/o dizziness , headache,heat to head,    Shortness of Breath     1. Have you been to the ER, urgent care clinic since your last visit? Hospitalized since your last visit? Yes When: 5/1/18;[facility]; CHEST PAIN    2. Have you seen or consulted any other health care providers outside of the [address] [place] [address] since your last visit? Include any pap smears or colon screening.  No
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
## 2018-05-14 NOTE — PROGRESS NOTES
Ms. Claudio's management was discussed with ZORA Cardona. Given her hypertrophic LV with obvious elevation of left atrial pressure, she will do better without hydrochlorothiazide. Since blood pressure is still not well controlled, we decided to begin valsartan superimposed over labetalol with a plan to reduce labetalol if in conclusion the patient triggered cardiac event recording does not demonstrate any tendency toward atrial arrhythmia. Next contact will be when the event recording is completed and reported. It will be desirable to treat without hydrochlorothiazide if possible because of the tendency to exacerbate compliance problems in left ventricular hypertrophy.

## 2018-05-14 NOTE — PATIENT INSTRUCTIONS
-- Take daily walk  -- Watch the salt/sodium  -- Eat healthy, fresh foods  -- Try to loose weight  -- We will call you with the results of your heart monitor  -- We will see you in 3 months      DASH Diet: Care Instructions  Your Care Instructions    The DASH diet is an eating plan that can help lower your blood pressure. DASH stands for Dietary Approaches to Stop Hypertension. Hypertension is high blood pressure. The DASH diet focuses on eating foods that are high in calcium, potassium, and magnesium. These nutrients can lower blood pressure. The foods that are highest in these nutrients are fruits, vegetables, low-fat dairy products, nuts, seeds, and legumes. But taking calcium, potassium, and magnesium supplements instead of eating foods that are high in those nutrients does not have the same effect. The DASH diet also includes whole grains, fish, and poultry. The DASH diet is one of several lifestyle changes your doctor may recommend to lower your high blood pressure. Your doctor may also want you to decrease the amount of sodium in your diet. Lowering sodium while following the DASH diet can lower blood pressure even further than just the DASH diet alone. Follow-up care is a key part of your treatment and safety. Be sure to make and go to all appointments, and call your doctor if you are having problems. It's also a good idea to know your test results and keep a list of the medicines you take. How can you care for yourself at home? Following the DASH diet  · Eat 4 to 5 servings of fruit each day. A serving is 1 medium-sized piece of fruit, ½ cup chopped or canned fruit, 1/4 cup dried fruit, or 4 ounces (½ cup) of fruit juice. Choose fruit more often than fruit juice. · Eat 4 to 5 servings of vegetables each day. A serving is 1 cup of lettuce or raw leafy vegetables, ½ cup of chopped or cooked vegetables, or 4 ounces (½ cup) of vegetable juice. Choose vegetables more often than vegetable juice.   · Get 2 to 3 servings of low-fat and fat-free dairy each day. A serving is 8 ounces of milk, 1 cup of yogurt, or 1 ½ ounces of cheese. · Eat 6 to 8 servings of grains each day. A serving is 1 slice of bread, 1 ounce of dry cereal, or ½ cup of cooked rice, pasta, or cooked cereal. Try to choose whole-grain products as much as possible. · Limit lean meat, poultry, and fish to 2 servings each day. A serving is 3 ounces, about the size of a deck of cards. · Eat 4 to 5 servings of nuts, seeds, and legumes (cooked dried beans, lentils, and split peas) each week. A serving is 1/3 cup of nuts, 2 tablespoons of seeds, or ½ cup of cooked beans or peas. · Limit fats and oils to 2 to 3 servings each day. A serving is 1 teaspoon of vegetable oil or 2 tablespoons of salad dressing. · Limit sweets and added sugars to 5 servings or less a week. A serving is 1 tablespoon jelly or jam, ½ cup sorbet, or 1 cup of lemonade. · Eat less than 2,300 milligrams (mg) of sodium a day. If you limit your sodium to 1,500 mg a day, you can lower your blood pressure even more. Tips for success  · Start small. Do not try to make dramatic changes to your diet all at once. You might feel that you are missing out on your favorite foods and then be more likely to not follow the plan. Make small changes, and stick with them. Once those changes become habit, add a few more changes. · Try some of the following:  ¨ Make it a goal to eat a fruit or vegetable at every meal and at snacks. This will make it easy to get the recommended amount of fruits and vegetables each day. ¨ Try yogurt topped with fruit and nuts for a snack or healthy dessert. ¨ Add lettuce, tomato, cucumber, and onion to sandwiches. ¨ Combine a ready-made pizza crust with low-fat mozzarella cheese and lots of vegetable toppings. Try using tomatoes, squash, spinach, broccoli, carrots, cauliflower, and onions.   ¨ Have a variety of cut-up vegetables with a low-fat dip as an appetizer instead of chips and dip. ¨ Sprinkle sunflower seeds or chopped almonds over salads. Or try adding chopped walnuts or almonds to cooked vegetables. ¨ Try some vegetarian meals using beans and peas. Add garbanzo or kidney beans to salads. Make burritos and tacos with mashed alvarado beans or black beans. Where can you learn more? Go to http://emelina-nile.info/. Enter S249 in the search box to learn more about \"DASH Diet: Care Instructions. \"  Current as of: September 21, 2016  Content Version: 11.4  © 0696-3299 [employer]. Care instructions adapted under license by [employer] (which disclaims liability or warranty for this information). If you have questions about a medical condition or this instruction, always ask your healthcare professional. [address] any warranty or liability for your use of this information.

## 2018-05-30 NOTE — PROGRESS NOTES
Spoke with pt . Verified 2 identifers. Told pt per PA-Tiffanie Cardona :Fast heart beat, but nothing abnormal Thyroid panel was normal in April 2018. She uses her albuterol inhaler very frequently. Suspect this is playing a role in her fast heart beat.  She should follow up with her PCP about the frequent use of albuterol and focus on improving her cardiovascular health -- exercise daily and follow a healthy diet.     Patient verbalize understanding .

## 2018-05-30 NOTE — PROGRESS NOTES
Fast heart beat, but nothing abnormal Thyroid panel was normal in April 2018. She uses her albuterol inhaler very frequently. Suspect this is playing a role in her fast heart beat. She should follow up with her PCP about the frequent use of albuterol and focus on improving her cardiovascular health -- exercise daily and follow a healthy diet.

## 2018-06-04 ENCOUNTER — APPOINTMENT (OUTPATIENT)
Dept: ULTRASOUND IMAGING | Age: 33
End: 2018-06-04
Attending: PHYSICIAN ASSISTANT
Payer: MEDICARE

## 2018-06-04 ENCOUNTER — HOSPITAL ENCOUNTER (EMERGENCY)
Age: 33
Discharge: HOME | End: 2018-06-04
Attending: EMERGENCY MEDICINE | Admitting: EMERGENCY MEDICINE
Payer: MEDICARE

## 2018-06-04 VITALS
WEIGHT: 293 LBS | HEART RATE: 101 BPM | RESPIRATION RATE: 22 BRPM | BODY MASS INDEX: 50.02 KG/M2 | DIASTOLIC BLOOD PRESSURE: 91 MMHG | HEIGHT: 64 IN | SYSTOLIC BLOOD PRESSURE: 147 MMHG | TEMPERATURE: 98.2 F | OXYGEN SATURATION: 98 %

## 2018-06-04 DIAGNOSIS — N93.8 DUB (DYSFUNCTIONAL UTERINE BLEEDING): Primary | ICD-10-CM

## 2018-06-04 LAB
ANION GAP SERPL CALC-SCNC: 9 MMOL/L (ref 5–15)
APPEARANCE UR: CLEAR
BACTERIA URNS QL MICRO: NEGATIVE /HPF
BASOPHILS # BLD: 0 K/UL (ref 0–0.1)
BASOPHILS NFR BLD: 0 % (ref 0–1)
BILIRUB UR QL: NEGATIVE
BUN SERPL-MCNC: 9 MG/DL (ref 6–20)
BUN/CREAT SERPL: 11 (ref 12–20)
CALCIUM SERPL-MCNC: 8.9 MG/DL (ref 8.5–10.1)
CHLORIDE SERPL-SCNC: 102 MMOL/L (ref 97–108)
CLUE CELLS VAG QL WET PREP: NORMAL
CO2 SERPL-SCNC: 28 MMOL/L (ref 21–32)
COLOR UR: ABNORMAL
CREAT SERPL-MCNC: 0.8 MG/DL (ref 0.55–1.02)
DIFFERENTIAL METHOD BLD: ABNORMAL
EOSINOPHIL # BLD: 0.2 K/UL (ref 0–0.4)
EOSINOPHIL NFR BLD: 2 % (ref 0–7)
EPITH CASTS URNS QL MICRO: ABNORMAL /LPF
ERYTHROCYTE [DISTWIDTH] IN BLOOD BY AUTOMATED COUNT: 15.9 % (ref 11.5–14.5)
GLUCOSE SERPL-MCNC: 139 MG/DL (ref 65–100)
GLUCOSE UR STRIP.AUTO-MCNC: NEGATIVE MG/DL
HCG UR QL: NEGATIVE
HCT VFR BLD AUTO: 35.8 % (ref 35–47)
HGB BLD-MCNC: 11.6 G/DL (ref 11.5–16)
HGB UR QL STRIP: ABNORMAL
IMM GRANULOCYTES # BLD: 0 K/UL (ref 0–0.04)
IMM GRANULOCYTES NFR BLD AUTO: 0 % (ref 0–0.5)
KETONES UR QL STRIP.AUTO: NEGATIVE MG/DL
KOH PREP SPEC: NORMAL
LEUKOCYTE ESTERASE UR QL STRIP.AUTO: NEGATIVE
LYMPHOCYTES # BLD: 2.1 K/UL (ref 0.8–3.5)
LYMPHOCYTES NFR BLD: 34 % (ref 12–49)
MCH RBC QN AUTO: 25.8 PG (ref 26–34)
MCHC RBC AUTO-ENTMCNC: 32.4 G/DL (ref 30–36.5)
MCV RBC AUTO: 79.7 FL (ref 80–99)
MONOCYTES # BLD: 0.5 K/UL (ref 0–1)
MONOCYTES NFR BLD: 8 % (ref 5–13)
NEUTS SEG # BLD: 3.5 K/UL (ref 1.8–8)
NEUTS SEG NFR BLD: 55 % (ref 32–75)
NITRITE UR QL STRIP.AUTO: NEGATIVE
NRBC # BLD: 0 K/UL (ref 0–0.01)
NRBC BLD-RTO: 0 PER 100 WBC
PH UR STRIP: 7 [PH] (ref 5–8)
PLATELET # BLD AUTO: 370 K/UL (ref 150–400)
PMV BLD AUTO: 9.4 FL (ref 8.9–12.9)
POTASSIUM SERPL-SCNC: 3.4 MMOL/L (ref 3.5–5.1)
PROT UR STRIP-MCNC: NEGATIVE MG/DL
RBC # BLD AUTO: 4.49 M/UL (ref 3.8–5.2)
RBC #/AREA URNS HPF: ABNORMAL /HPF (ref 0–5)
SERVICE CMNT-IMP: NORMAL
SODIUM SERPL-SCNC: 139 MMOL/L (ref 136–145)
SP GR UR REFRACTOMETRY: 1.01 (ref 1–1.03)
T VAGINALIS VAG QL WET PREP: NORMAL
UA: UC IF INDICATED,UAUC: ABNORMAL
UROBILINOGEN UR QL STRIP.AUTO: 1 EU/DL (ref 0.2–1)
WBC # BLD AUTO: 6.3 K/UL (ref 3.6–11)
WBC URNS QL MICRO: ABNORMAL /HPF (ref 0–4)

## 2018-06-04 PROCEDURE — 87491 CHLMYD TRACH DNA AMP PROBE: CPT | Performed by: PHYSICIAN ASSISTANT

## 2018-06-04 PROCEDURE — 80048 BASIC METABOLIC PNL TOTAL CA: CPT | Performed by: PHYSICIAN ASSISTANT

## 2018-06-04 PROCEDURE — 99284 EMERGENCY DEPT VISIT MOD MDM: CPT

## 2018-06-04 PROCEDURE — 85025 COMPLETE CBC W/AUTO DIFF WBC: CPT | Performed by: PHYSICIAN ASSISTANT

## 2018-06-04 PROCEDURE — 87210 SMEAR WET MOUNT SALINE/INK: CPT | Performed by: PHYSICIAN ASSISTANT

## 2018-06-04 PROCEDURE — 36415 COLL VENOUS BLD VENIPUNCTURE: CPT | Performed by: PHYSICIAN ASSISTANT

## 2018-06-04 PROCEDURE — 81025 URINE PREGNANCY TEST: CPT

## 2018-06-04 PROCEDURE — 76856 US EXAM PELVIC COMPLETE: CPT

## 2018-06-04 PROCEDURE — 81001 URINALYSIS AUTO W/SCOPE: CPT | Performed by: PHYSICIAN ASSISTANT

## 2018-06-04 PROCEDURE — 76830 TRANSVAGINAL US NON-OB: CPT

## 2018-06-04 RX ORDER — SODIUM CHLORIDE 0.9 % (FLUSH) 0.9 %
5-10 SYRINGE (ML) INJECTION EVERY 8 HOURS
Status: DISCONTINUED | OUTPATIENT
Start: 2018-06-04 | End: 2018-06-04 | Stop reason: HOSPADM

## 2018-06-04 RX ORDER — ACETAMINOPHEN 325 MG/1
650 TABLET ORAL
Qty: 20 TAB | Refills: 0 | Status: SHIPPED | OUTPATIENT
Start: 2018-06-04 | End: 2018-12-07

## 2018-06-04 RX ORDER — SODIUM CHLORIDE 0.9 % (FLUSH) 0.9 %
5-10 SYRINGE (ML) INJECTION AS NEEDED
Status: DISCONTINUED | OUTPATIENT
Start: 2018-06-04 | End: 2018-06-04 | Stop reason: HOSPADM

## 2018-06-04 NOTE — ED NOTES
Discharge instructions were given to the patient by ZORA Cardenas. The patient left the Emergency Department ambulatory, alert and oriented and in no acute distress with 1 prescription. The patient was encouraged to call or return to the ED for worsening issues or problems and was encouraged to schedule a follow up appointment for continuing care. The patient verbalized understanding of discharge instructions and prescriptions, all questions were answered. The patient has no further concerns at this time.

## 2018-06-04 NOTE — LETTER
[facility] EMERGENCY DEPT 
[address] [facility] [address] [address] 
[phone] Work/School Note Date: 6/4/2018 To Whom It May concern: Soy Dsouza was seen and treated today in the emergency room by the following provider(s): 
Attending Provider: Nellie Huitron MD 
Physician Assistant: ZORA Kaiser. Soy Dsouza may return to work on 6/5/2018. Sincerely, ZORA Kaiser

## 2018-06-04 NOTE — ED NOTES
Pt presents ambulatory to ED complaining of vaginal bleeding. Pt reports hx of irregular periods, reports she has been on her period for 1 week and has had large clots and heavy bleeding. Pt is alert and oriented x 4, RR even and unlabored, skin is warm and dry. Assesment completed and pt updated on plan of care. Emergency Department Nursing Plan of Care       The Nursing Plan of Care is developed from the Nursing assessment and Emergency Department Attending provider initial evaluation. The plan of care may be reviewed in the ED Provider note.     The Plan of Care was developed with the following considerations:   Patient / Family readiness to learn indicated by:verbalized understanding  Persons(s) to be included in education: patient  Barriers to Learning/Limitations:No    Signed     Isaura Wilde RN    6/4/2018   12:08 PM
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
## 2018-06-04 NOTE — ED TRIAGE NOTES
patient with hx of PCOS presents with concerns of vaginal bleeding that has gotten heavier in the past 24 hours. Reports saturating 6 overnight pads in the past 24 hours. States she does not have GYN follow up. Reports \"cramping\" pain in pelvis. LMP about three months ago.

## 2018-06-04 NOTE — DISCHARGE INSTRUCTIONS
Abnormal Uterine Bleeding: Care Instructions  Your Care Instructions    Abnormal uterine bleeding (AUB) is irregular bleeding from the uterus that is longer or heavier than usual or does not occur at your regular time. Sometimes it is caused by changes in hormone levels. It can also be caused by growths in the uterus, such as fibroids or polyps. Sometimes a cause cannot be found. You may have heavy bleeding when you are not expecting your period. Your doctor may suggest a pregnancy test, if you think you are pregnant. Follow-up care is a key part of your treatment and safety. Be sure to make and go to all appointments, and call your doctor if you are having problems. It's also a good idea to know your test results and keep a list of the medicines you take. How can you care for yourself at home? · Be safe with medicines. Take pain medicines exactly as directed. ¨ If the doctor gave you a prescription medicine for pain, take it as prescribed. ¨ If you are not taking a prescription pain medicine, ask your doctor if you can take an over-the-counter medicine. · You may be low in iron because of blood loss. Eat a balanced diet that is high in iron and vitamin C. Foods rich in iron include red meat, shellfish, eggs, beans, and leafy green vegetables. Talk to your doctor about whether you need to take iron pills or a multivitamin. When should you call for help? Call 911 anytime you think you may need emergency care. For example, call if:  ? · You passed out (lost consciousness). ?Call your doctor now or seek immediate medical care if:  ? · You have new or worse belly or pelvic pain. ? · You have severe vaginal bleeding. ? · You feel dizzy or lightheaded, or you feel like you may faint. ? Watch closely for changes in your health, and be sure to contact your doctor if:  ? · You think you may be pregnant. ? · Your bleeding gets worse. ? · You do not get better as expected.    Where can you learn more?  Go to http://emelina-nile.info/. Enter B957 in the search box to learn more about \"Abnormal Uterine Bleeding: Care Instructions. \"  Current as of: October 13, 2016  Content Version: 11.4  © 1656-5277 [employer]. Care instructions adapted under license by [employer] (which disclaims liability or warranty for this information). If you have questions about a medical condition or this instruction, always ask your healthcare professional. [place] [address] any warranty or liability for your use of this information.

## 2018-06-11 ENCOUNTER — HOSPITAL ENCOUNTER (OUTPATIENT)
Dept: LAB | Age: 33
Discharge: HOME | End: 2018-06-11
Payer: MEDICARE

## 2018-06-11 ENCOUNTER — OFFICE VISIT (OUTPATIENT)
Dept: OBGYN CLINIC | Age: 33
End: 2018-06-11

## 2018-06-11 VITALS
DIASTOLIC BLOOD PRESSURE: 93 MMHG | HEART RATE: 92 BPM | WEIGHT: 293 LBS | SYSTOLIC BLOOD PRESSURE: 143 MMHG | HEIGHT: 64 IN | RESPIRATION RATE: 16 BRPM | TEMPERATURE: 97.9 F | BODY MASS INDEX: 50.02 KG/M2

## 2018-06-11 DIAGNOSIS — B96.89 BV (BACTERIAL VAGINOSIS): ICD-10-CM

## 2018-06-11 DIAGNOSIS — N93.9 ABNORMAL UTERINE BLEEDING (AUB): Primary | ICD-10-CM

## 2018-06-11 DIAGNOSIS — N76.0 BV (BACTERIAL VAGINOSIS): ICD-10-CM

## 2018-06-11 DIAGNOSIS — N94.6 DYSMENORRHEA: ICD-10-CM

## 2018-06-11 PROCEDURE — 87625 HPV TYPES 16 & 18 ONLY: CPT | Performed by: OBSTETRICS & GYNECOLOGY

## 2018-06-11 PROCEDURE — 88175 CYTOPATH C/V AUTO FLUID REDO: CPT | Performed by: OBSTETRICS & GYNECOLOGY

## 2018-06-11 PROCEDURE — 87624 HPV HI-RISK TYP POOLED RSLT: CPT | Performed by: OBSTETRICS & GYNECOLOGY

## 2018-06-11 RX ORDER — METRONIDAZOLE 500 MG/1
500 TABLET ORAL 2 TIMES DAILY
Qty: 14 TAB | Refills: 0 | Status: SHIPPED | OUTPATIENT
Start: 2018-06-11 | End: 2018-06-18

## 2018-06-11 RX ORDER — HYDROCODONE BITARTRATE AND ACETAMINOPHEN 7.5; 325 MG/1; MG/1
1 TABLET ORAL
COMMUNITY
End: 2019-04-20

## 2018-06-11 NOTE — PROGRESS NOTES
Pt is here for evaluation of heavy painful irregular cycles and possible PCOS. She is having some pelvic cramps today. She is also spotting a little bit. This is her second period in 4 wks. She was in the hospital recently for heavy bleeding, soaking up 5 overnight pads in one hour. Her periods are very irregular. Sometimes she goes 3 to 5 months with no cycle. She doesn't remember when her last pap was, possibly many years ago. She was also told that she might have PCOS when she was a teenager.

## 2018-06-11 NOTE — MR AVS SNAPSHOT
[address] [facility] [address] [facility] 
[phone] Patient: Gilson Fernandes MRN: [id number] :1985 Visit Information Date & Time Provider Department Dept. Phone Encounter #  
 2018  1:10 PM Petey Gonzalez MD [facility] AT [address] [id number] Follow-up Instructions Return in about 2 weeks (around 2018). Upcoming Health Maintenance Date Due  
 PAP AKA CERVICAL CYTOLOGY 3/28/2006 Influenza Age 5 to Adult 2018 Allergies as of 2018  Review Complete On: 2018 By: Jodie Cali Severity Noted Reaction Type Reactions Aspirin High 2010   Side Effect Rash, Nausea and Vomiting, Myalgia Vicodin [Hydrocodone-acetaminophen] High 2013    Anaphylaxis Flexeril [Cyclobenzaprine] Medium 2010   Side Effect Nausea and Vomiting Ibuprofen  2012    Rash Ivp [Fd And C Blue No.1]  2010   Side Effect Itching Pcn [Penicillins]  2010    Rash Pt states she is allergic to all \"cillins\" Toradol [Ketorolac Tromethamine]  2010    Rash Ultram [Tramadol]  07/10/2014   Intolerance Nausea and Vomiting Pt reports headache with n/v when taking this med. Current Immunizations  Reviewed on 2015 Name Date Influenza Vaccine PF 2015  3:55 PM  
 Influenza Vaccine Whole 10/26/2009 Pneumococcal Polysaccharide (PPSV-23) 2015  3:54 PM  
 TD Vaccine 2004 Not reviewed this visit You Were Diagnosed With   
  
 Codes Comments Abnormal uterine bleeding (AUB)    -  Primary ICD-10-CM: N93.9 ICD-9-CM: 626.9 Dysmenorrhea     ICD-10-CM: N94.6 ICD-9-CM: 625.3 BV (bacterial vaginosis)     ICD-10-CM: N76.0, B96.89 
ICD-9-CM: 616.10, 041.9 Vitals BP Pulse Temp Resp Height(growth percentile) Weight(growth percentile) (!) 143/93 92 97.9 °F (36.6 °C) (Oral) 16 5' 3.6\" (1.615 m) 321 lb 12.8 oz (146 kg) BMI OB Status Smoking Status 55.93 kg/m2 Polycystic Ovarian Syndrome Current Every Day Smoker Vitals History BMI and BSA Data Body Mass Index Body Surface Area 55.93 kg/m 2 2.56 m 2 Preferred Pharmacy Pharmacy Name Phone [facility] [phone] Your Updated Medication List  
  
   
This list is accurate as of 6/11/18  2:27 PM.  Always use your most recent med list.  
  
  
  
  
 acetaminophen 325 mg tablet Commonly known as:  TYLENOL Take 2 Tabs by mouth every four (4) hours as needed for Pain. acyclovir 400 mg tablet Commonly known as:  ZOVIRAX Take 800 mg by mouth three (3) times daily. albuterol 90 mcg/actuation inhaler Commonly known as:  PROVENTIL HFA, VENTOLIN HFA, PROAIR HFA Take 1-2 puffs by inhalation every four (4) hours as needed for Wheezing. cloNIDine HCl 0.2 mg tablet Commonly known as:  CATAPRES Take 1 tablet by mouth three (3) times daily. fluticasone-salmeterol 100-50 mcg/dose diskus inhaler Commonly known as:  ADVAIR Take 1 puff by inhalation every twelve (12) hours. hydroCHLOROthiazide 12.5 mg capsule Commonly known as:  Melany Donate Take 12.5 mg by mouth daily. HYDROcodone-acetaminophen 7.5-325 mg per tablet Commonly known as:  Billye Norwood Take 1 Tab by mouth every eight (8) hours as needed for Pain. insulin aspart U-100 100 unit/mL Inpn Commonly known as:  Oletta Matas Use as directed up to 4 times daily per sliding scale: 150-200: 2 units, 201-250: 4 units, 251-300: 6 units, 301-350: 8 units, 351-400: 10 units  
  
 ketoconazole 2 % topical cream  
Commonly known as:  NIZORAL Apply  to affected area daily. labetalol 200 mg tablet Commonly known as:  Haleigh Listen Take 1 Tab by mouth two (2) times a day. Indications: hypertension LEVEMIR U-100 INSULIN 100 unit/mL injection Generic drug:  insulin detemir U-100  
23 Units by SubCUTAneous route nightly. levETIRAcetam 100 mg/ml Soln oral solution Commonly known as:  KEPPRA Take 15 mL by mouth two (2) times a day. LIDODERM 5 % Generic drug:  lidocaine  
by TransDERmal route every twenty-four (24) hours. Apply patch to the affected area for 12 hours a day and remove for 12 hours a day. loratadine 10 mg Cap Take  by mouth daily. metroNIDAZOLE 500 mg tablet Commonly known as:  FLAGYL Take 1 Tab by mouth two (2) times a day for 7 days. Indications: Bacterial Vaginosis MULTI VITAMIN PO Take 1 Tab by mouth daily. ondansetron 4 mg disintegrating tablet Commonly known as:  ZOFRAN ODT Take 1 Tab by mouth every eight (8) hours as needed for Nausea for up to 5 doses. promethazine 25 mg tablet Commonly known as:  PHENERGAN Take 1 Tab by mouth every six (6) hours as needed. Prescriptions Sent to Pharmacy Refills  
 metroNIDAZOLE (FLAGYL) 500 mg tablet 0 Sig: Take 1 Tab by mouth two (2) times a day for 7 days. Indications: Bacterial Vaginosis Class: Normal  
 Pharmacy: [address] Ph #: [phone] Route: Oral  
  
Follow-up Instructions Return in about 2 weeks (around 6/25/2018). Patient Instructions Learning About Birth Control: The Implant What is the implant? The implant is used to prevent pregnancy. It's a thin stevo about the size of a matchstick that is inserted under the skin (subdermal) on the inside of your arm. The implant releases the hormone progestin to prevent pregnancy. Progestin prevents pregnancy in these ways: It thickens the mucus in the cervix. This makes it hard for sperm to travel into the uterus.  It also thins the lining of the uterus, which makes it harder for a fertilized egg to attach to the uterus. Progestin can sometimes stop the ovaries from releasing an egg each month (ovulation). The implant prevents pregnancy for 3 years. Once it is put in, you don't have to do anything else to prevent pregnancy. The implant can only be inserted and removed by your doctor or another trained health professional. These procedures can be done in your doctor's office and only take a few minutes. Your doctor numbs the area and \"injects\" the implant under your skin. No cuts are made in your skin. To remove the implant, your doctor numbs the area, makes a small cut in the skin, and pulls the implant out. How well does it work? The implant works very well. Fewer than 1 woman out of 100 has an unplanned pregnancy. Be sure to tell your doctor about any health problems you have or medicines you take. He or she can help you choose the birth control method that is right for you. What are the advantages of the implant? · The implant is one of the most effective methods of birth control. · It prevents pregnancy for up to 3 years. You don't have to worry about birth control for this time. · It's safe to use while breastfeeding. · The implant doesn't contain estrogen. So you can use it if you don't want to take estrogen or can't take estrogen because you have certain health problems or concerns. · It may reduce heavy bleeding and cramping. · It's convenient. It is always providing birth control and cannot be seen. You don't need to remember to take a pill or get a shot. You don't have to interrupt sex to protect against pregnancy. What are the disadvantages of the implant? · The implant doesn't protect against sexually transmitted infections (STIs), such as herpes or HIV/AIDS. If you aren't sure if your sex partner might have an STI, use a condom to protect against infection.  
· It may cause irregular periods, or you may have spotting between periods. You may also stop getting a period. Some women see having no period as an advantage. · It may cause mood changes, less interest in sex, or weight gain. · You have to see a doctor to have an implant inserted and removed. Where can you learn more? Go to http://emelina-nile.info/. Enter F276 in the search box to learn more about \"Learning About Birth Control: The Implant. \" Current as of: March 16, 2017 Content Version: 11.4 © 5888-3295 [employer]. Care instructions adapted under license by [employer] (which disclaims liability or warranty for this information). If you have questions about a medical condition or this instruction, always ask your healthcare professional. [address] any warranty or liability for your use of this information. Levonorgestrel (Into the uterus) Levonorgestrel (lrl-pwx-[id number]-ROMY-trel) Prevents pregnancy and treats heavy menstrual bleeding. This is an intrauterine device (IUD), which is a reversible form of birth control. This IUD slowly releases levonorgestrel, a hormone. Brand Name(s): Rachel Suarez Superior There may be other brand names for this medicine. When This Medicine Should Not Be Used: This device is not right for everyone. Do not use it if you had an allergic reaction to levonorgestrel, or if you are pregnant. How to Use This Medicine:  
Device · A nurse or other trained health professional will give you this medicine. · The IUD is usually inserted by your doctor during your monthly period. You will need to see your doctor 4 to 6 weeks after the IUD is placed and then once a year. · Your IUD has a string or \"tail\" that is made of plastic thread. About one or two inches of this string hangs into your vagina. You cannot see this string, and it will not cause problems when you have sex. Check your IUD after each monthly period.  You may not be protected against pregnancy if you cannot feel the string or if you feel plastic. Do the following to check the placement of your IUD: 
[facility] your hands with soap and warm water. Dry them with a clean towel. ¨ Bend your knees and squat low to the ground. ¨ Gently put your index finger high inside your vagina. The cervix is at the top of the vagina. Find the IUD string coming from your cervix. Never pull on the string. You should not be able to feel the plastic of the IUD itself. Wash your hands after you are done checking your IUD string. · Your doctor will need to remove your IUD after 3 years for [place], after 4 years for WIEDEN, or after 5 years for [facility] or [place] [address]. You will also need to have it replaced if it comes out of your uterus. If you are using Mirena® or Liletta® and want to stop, your doctor can remove it at any time. However, you may become pregnant as soon as Mirena® is removed or if you have intercourse the week before WIEDEN is removed. Use another form of birth control or have a new IUD inserted to keep from getting pregnant. Drugs and Foods to Avoid: Ask your doctor or pharmacist before using any other medicine, including over-the-counter medicines, vitamins, and herbal products. · Some medicines can affect how this device works. Tell your doctor if you are using a blood thinner (including warfarin). Warnings While Using This Medicine: · Tell your doctor if you are breastfeeding, or if you had a baby, miscarriage, or  in the past 3 months. Tell your doctor if you have liver disease (including tumor or cancer), heart disease, breast cancer, heart or blood circulation problems, migraine, high blood pressure, or a history of heart valve problems, blood clotting problems, stroke, or heart attack. Tell your doctor if you have problems with your immune system or have had surgery on your female organs (especially fallopian tubes).  
· Tell your doctor if you have had any problems, infections, or other conditions that affected your reproductive system. There are many problems that could make an IUD a bad choice for you, including if you have fibroids, unexplained bleeding, a uterus that has an unusual shape, a recent infection, a history of pelvic inflammatory disease, an abnormal Pap test, ectopic pregnancy, cancer or suspected cancer, or an existing IUD. · There is a small chance that you could get pregnant when using an IUD, just as there is with any birth control. If you get pregnant, your doctor may remove your IUD to lower the risk of miscarriage or other problems. · This medicine may cause the following problems: 
¨ Increased risk of ectopic pregnancy (pregnancy outside the uterus) ¨ Increased risk of serious infections, including sepsis ¨ Increased risk of pelvic inflammatory disease (PID) or endometritis ¨ Perforation (hole in the wall of your uterus), which can damage other organs ¨ Increased risk for ovarian cysts ¨ Increased risk of breast cancer ¨ Increased risk of high blood pressure, stroke, heart attack, or clotting problems · You might have some spotting and cramping during the first weeks after the IUD has been inserted. These symptoms should decrease or go away within a few weeks up to 6 months. · You could have less bleeding or even stop having periods by the end of the first year. Call your doctor if you have a change from your regular bleeding pattern after you have had your IUD for awhile, such as more bleeding or if you miss a period (and you were having periods even with your IUD). · An IUD can slip partly or all the way out of your uterus. If this happens, use condoms or another form of birth control, and call your doctor right away. · This IUD will not protect you from HIV/AIDS or other sexually transmitted diseases.  
· If you have the Victorina Moses or Agata Guevara, tell your healthcare provider before you have an MRI test. 
 · Your doctor will check your progress and the effects of this medicine at regular visits. Keep all appointments. Possible Side Effects While Using This Medicine:  
Call your doctor right away if you notice any of these side effects: · Allergic reaction: Itching or hives, swelling in your face or hands, swelling or tingling in your mouth or throat, chest tightness, trouble breathing · Chest pain, problems with speech or walking, numbness or weakness in your arm or leg or on one side of your body · Heavy bleeding from your vagina · Pain during sex, or if your partner feels the hard plastic of the IUD during sex · Severe headache, vision changes · Stomach or pelvic pain, tenderness, or cramping that is sudden or severe · Unusual bleeding, bruising, or weakness · Vaginal discharge that has a bad smell, fever, chills, sores on your genitals · Yellow skin or eyes If you notice these less serious side effects, talk with your doctor: · Acne or other skin changes · Breast pain · Change in bleeding pattern after the first few months · Dizziness or lightheadedness after IUD is placed · Mild itching around your vagina and genitals If you notice other side effects that you think are caused by this medicine, tell your doctor. Call your doctor for medical advice about side effects. You may report side effects to FDA at 5-062-FDA-9544 © 2017 [address] Information is for End User's use only and may not be sold, redistributed or otherwise used for commercial purposes. The above information is an  only. It is not intended as medical advice for individual conditions or treatments. Talk to your doctor, nurse or pharmacist before following any medical regimen to see if it is safe and effective for you. Introducing [facility]!    
 [facility] introduces [employer] patient portal. Now you can access parts of your medical record, email your doctor's office, and request medication refills online. 1. In your internet browser, go to [email or web address] 2. Click on the First Time User? Click Here link in the Sign In box. You will see the New Member Sign Up page. 3. Enter your L3 Access Code exactly as it appears below. You will not need to use this code after youve completed the sign-up process. If you do not sign up before the expiration date, you must request a new code. · L3 Access Code: [email or web address] Expires: 7/8/2018 10:39 AM 
 
4. Enter the last four digits of your Social Security Number (xxxx) and Date of Birth (mm/dd/yyyy) as indicated and click Submit. You will be taken to the next sign-up page. 5. Create a L3 ID. This will be your L3 login ID and cannot be changed, so think of one that is secure and easy to remember. 6. Create a L3 password. You can change your password at any time. 7. Enter your Password Reset Question and Answer. This can be used at a later time if you forget your password. 8. Enter your e-mail address. You will receive e-mail notification when new information is available in [address] 9. Click Sign Up. You can now view and download portions of your medical record. 10. Click the Download Summary menu link to download a portable copy of your medical information. If you have questions, please visit the Frequently Asked Questions section of the L3 website. Remember, L3 is NOT to be used for urgent needs. For medical emergencies, dial 911. Now available from your iPhone and Android! Please provide this summary of care documentation to your next provider. Your primary care clinician is listed as Alize Brooks. If you have any questions after today's visit, please call [phone].
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
## 2018-06-11 NOTE — PATIENT INSTRUCTIONS
Learning About Birth Control: The Implant  What is the implant? The implant is used to prevent pregnancy. It's a thin stevo about the size of a matchstick that is inserted under the skin (subdermal) on the inside of your arm. The implant releases the hormone progestin to prevent pregnancy. Progestin prevents pregnancy in these ways: It thickens the mucus in the cervix. This makes it hard for sperm to travel into the uterus. It also thins the lining of the uterus, which makes it harder for a fertilized egg to attach to the uterus. Progestin can sometimes stop the ovaries from releasing an egg each month (ovulation). The implant prevents pregnancy for 3 years. Once it is put in, you don't have to do anything else to prevent pregnancy. The implant can only be inserted and removed by your doctor or another trained health professional. These procedures can be done in your doctor's office and only take a few minutes. Your doctor numbs the area and \"injects\" the implant under your skin. No cuts are made in your skin. To remove the implant, your doctor numbs the area, makes a small cut in the skin, and pulls the implant out. How well does it work? The implant works very well. Fewer than 1 woman out of 100 has an unplanned pregnancy. Be sure to tell your doctor about any health problems you have or medicines you take. He or she can help you choose the birth control method that is right for you. What are the advantages of the implant? · The implant is one of the most effective methods of birth control. · It prevents pregnancy for up to 3 years. You don't have to worry about birth control for this time. · It's safe to use while breastfeeding. · The implant doesn't contain estrogen. So you can use it if you don't want to take estrogen or can't take estrogen because you have certain health problems or concerns. · It may reduce heavy bleeding and cramping. · It's convenient.  It is always providing birth control and cannot be seen. You don't need to remember to take a pill or get a shot. You don't have to interrupt sex to protect against pregnancy. What are the disadvantages of the implant? · The implant doesn't protect against sexually transmitted infections (STIs), such as herpes or HIV/AIDS. If you aren't sure if your sex partner might have an STI, use a condom to protect against infection. · It may cause irregular periods, or you may have spotting between periods. You may also stop getting a period. Some women see having no period as an advantage. · It may cause mood changes, less interest in sex, or weight gain. · You have to see a doctor to have an implant inserted and removed. Where can you learn more? Go to http://emelina-nile.info/. Enter F276 in the search box to learn more about \"Learning About Birth Control: The Implant. \"  Current as of: March 16, 2017  Content Version: 11.4  © 2351-5730 [employer]. Care instructions adapted under license by [employer] (which disclaims liability or warranty for this information). If you have questions about a medical condition or this instruction, always ask your healthcare professional. [address] any warranty or liability for your use of this information. Levonorgestrel (Into the uterus)   Levonorgestrel ([id number]-[id number]-ROMY-trel)  Prevents pregnancy and treats heavy menstrual bleeding. This is an intrauterine device (IUD), which is a reversible form of birth control. This IUD slowly releases levonorgestrel, a hormone. Brand Name(s): Brodie Johnston, [facility]   There may be other brand names for this medicine. When This Medicine Should Not Be Used: This device is not right for everyone. Do not use it if you had an allergic reaction to levonorgestrel, or if you are pregnant.   How to Use This Medicine:   Device  · A nurse or other trained health professional will give you this medicine. · The IUD is usually inserted by your doctor during your monthly period. You will need to see your doctor 4 to 6 weeks after the IUD is placed and then once a year. · Your IUD has a string or \"tail\" that is made of plastic thread. About one or two inches of this string hangs into your vagina. You cannot see this string, and it will not cause problems when you have sex. Check your IUD after each monthly period. You may not be protected against pregnancy if you cannot feel the string or if you feel plastic. Do the following to check the placement of your IUD:  [facility] AUTHORITY your hands with soap and warm water. Dry them with a clean towel. ¨ Bend your knees and squat low to the ground. ¨ Gently put your index finger high inside your vagina. The cervix is at the top of the vagina. Find the IUD string coming from your cervix. Never pull on the string. You should not be able to feel the plastic of the IUD itself. Wash your hands after you are done checking your IUD string. · Your doctor will need to remove your IUD after 3 years for [place], after 4 years for [place], or after 5 years for [facility] or [facility]. You will also need to have it replaced if it comes out of your uterus. If you are using Mirena® or Liletta® and want to stop, your doctor can remove it at any time. However, you may become pregnant as soon as Mirena® is removed or if you have intercourse the week before WIEDEN is removed. Use another form of birth control or have a new IUD inserted to keep from getting pregnant. Drugs and Foods to Avoid:   Ask your doctor or pharmacist before using any other medicine, including over-the-counter medicines, vitamins, and herbal products. · Some medicines can affect how this device works. Tell your doctor if you are using a blood thinner (including warfarin).   Warnings While Using This Medicine:   · Tell your doctor if you are breastfeeding, or if you had a baby, miscarriage, or  in the past 3 months. Tell your doctor if you have liver disease (including tumor or cancer), heart disease, breast cancer, heart or blood circulation problems, migraine, high blood pressure, or a history of heart valve problems, blood clotting problems, stroke, or heart attack. Tell your doctor if you have problems with your immune system or have had surgery on your female organs (especially fallopian tubes). · Tell your doctor if you have had any problems, infections, or other conditions that affected your reproductive system. There are many problems that could make an IUD a bad choice for you, including if you have fibroids, unexplained bleeding, a uterus that has an unusual shape, a recent infection, a history of pelvic inflammatory disease, an abnormal Pap test, ectopic pregnancy, cancer or suspected cancer, or an existing IUD. · There is a small chance that you could get pregnant when using an IUD, just as there is with any birth control. If you get pregnant, your doctor may remove your IUD to lower the risk of miscarriage or other problems. · This medicine may cause the following problems:  ¨ Increased risk of ectopic pregnancy (pregnancy outside the uterus)  ¨ Increased risk of serious infections, including sepsis  ¨ Increased risk of pelvic inflammatory disease (PID) or endometritis  ¨ Perforation (hole in the wall of your uterus), which can damage other organs  ¨ Increased risk for ovarian cysts  ¨ Increased risk of breast cancer  ¨ Increased risk of high blood pressure, stroke, heart attack, or clotting problems  · You might have some spotting and cramping during the first weeks after the IUD has been inserted. These symptoms should decrease or go away within a few weeks up to 6 months. · You could have less bleeding or even stop having periods by the end of the first year.  Call your doctor if you have a change from your regular bleeding pattern after you have had your IUD for awhile, such as more bleeding or if you miss a period (and you were having periods even with your IUD). · An IUD can slip partly or all the way out of your uterus. If this happens, use condoms or another form of birth control, and call your doctor right away. · This IUD will not protect you from HIV/AIDS or other sexually transmitted diseases. · If you have the [address] or Almita Muck, tell your healthcare provider before you have an MRI test.  · Your doctor will check your progress and the effects of this medicine at regular visits. Keep all appointments. Possible Side Effects While Using This Medicine:   Call your doctor right away if you notice any of these side effects:  · Allergic reaction: Itching or hives, swelling in your face or hands, swelling or tingling in your mouth or throat, chest tightness, trouble breathing  · Chest pain, problems with speech or walking, numbness or weakness in your arm or leg or on one side of your body  · Heavy bleeding from your vagina  · Pain during sex, or if your partner feels the hard plastic of the IUD during sex  · Severe headache, vision changes  · Stomach or pelvic pain, tenderness, or cramping that is sudden or severe  · Unusual bleeding, bruising, or weakness  · Vaginal discharge that has a bad smell, fever, chills, sores on your genitals  · Yellow skin or eyes  If you notice these less serious side effects, talk with your doctor:   · Acne or other skin changes  · Breast pain  · Change in bleeding pattern after the first few months  · Dizziness or lightheadedness after IUD is placed  · Mild itching around your vagina and genitals  If you notice other side effects that you think are caused by this medicine, tell your doctor. Call your doctor for medical advice about side effects. You may report side effects to FDA at 8-660-FDA-0095  © 2017 [facility] Information is for End User's use only and may not be sold, redistributed or otherwise used for commercial purposes.   The above information is an  only. It is not intended as medical advice for individual conditions or treatments. Talk to your doctor, nurse or pharmacist before following any medical regimen to see if it is safe and effective for you.

## 2018-06-22 ENCOUNTER — HOSPITAL ENCOUNTER (OUTPATIENT)
Dept: LAB | Age: 33
Discharge: HOME | End: 2018-06-22

## 2018-06-22 ENCOUNTER — OFFICE VISIT (OUTPATIENT)
Dept: OBGYN CLINIC | Age: 33
End: 2018-06-22

## 2018-06-22 VITALS
WEIGHT: 293 LBS | HEART RATE: 85 BPM | SYSTOLIC BLOOD PRESSURE: 146 MMHG | BODY MASS INDEX: 50.02 KG/M2 | TEMPERATURE: 96.6 F | HEIGHT: 64 IN | DIASTOLIC BLOOD PRESSURE: 109 MMHG | RESPIRATION RATE: 16 BRPM

## 2018-06-22 DIAGNOSIS — R87.810 ASCUS WITH POSITIVE HIGH RISK HPV CERVICAL: Primary | ICD-10-CM

## 2018-06-22 DIAGNOSIS — R87.610 ASCUS WITH POSITIVE HIGH RISK HPV CERVICAL: Primary | ICD-10-CM

## 2018-06-22 DIAGNOSIS — Z13.9 SCREENING FOR CONDITION: ICD-10-CM

## 2018-06-22 LAB
HCG URINE, QL. (POC): NEGATIVE
VALID INTERNAL CONTROL?: YES

## 2018-06-22 NOTE — PROCEDURES
Written and verbal consent obtained. Speculum was inserted and cervix was visualized. Cervix was then coated with acetic acid. Colposcopy was performed with clear and green lenses with the following findings:    Squamocolumnar junction was visualized in its entirety. No acetowhite lesions noted    ECC was then performed. Ectocervical biopsies were taken at 15. Hemostasis was achieved using Monsels. Hemostasis was noted to be excellent. Speculum was removed. Patient tolerated the procedure well and there were no complications. Of note, long speculum was used.   Cervix high in the vagins

## 2018-06-22 NOTE — PATIENT INSTRUCTIONS
Abnormal Pap Test: Care Instructions  Your Care Instructions    A Pap test (also called a Pap smear) is used to look for early changes that may become cancer of the cervix. Your Pap test was abnormal. That may mean that some cells in your cervix have changed. The cell changes are most often caused by human papillomavirus (HPV) infection. An abnormal Pap test does not mean that the abnormal cells will lead to cancer. Changes in cervical cells may go away on their own or may progress slowly. Your doctor may want to repeat the Pap test or have you take other tests to see if you have cell changes. It is very important that you have regular Pap tests after you've had an abnormal Pap test.  Follow-up care is a key part of your treatment and safety. Be sure to make and go to all appointments, and call your doctor if you are having problems. It's also a good idea to know your test results and keep a list of the medicines you take. How can you care for yourself at home? · Do not smoke. Smoking may increase your risk for cervical cell changes. If you need help quitting, talk to your doctor about stop-smoking programs and medicines. These can increase your chances of quitting for good. · Be sure to get follow-up Pap tests or other follow-up tests as recommended by your doctor. When should you call for help? Watch closely for changes in your health, and be sure to contact your doctor if you have any problems. Where can you learn more? Go to http://emelina-nile.info/. Enter P925 in the search box to learn more about \"Abnormal Pap Test: Care Instructions. \"  Current as of: May 12, 2017  Content Version: 11.4  © 2955-0609 [employer]. Care instructions adapted under license by [employer] (which disclaims liability or warranty for this information).  If you have questions about a medical condition or this instruction, always ask your healthcare professional. [employer] disclaims any warranty or liability for your use of this information. Colposcopy: What to Expect at [address] may feel some soreness in your vagina for a day or two if you had a biopsy. Some vaginal bleeding or discharge is normal for up to a week after a biopsy. The discharge may be dark-colored if a solution was put on your cervix. You can use a sanitary pad for the bleeding. It may take a week or two for you to get the test results. This care sheet gives you a general idea about how long it will take for you to recover. But each person recovers at a different pace. Follow the steps below to feel better as quickly as possible. How can you care for yourself at home? Activity  ? · You can return to work and most daily activities right after the test.   Exercise  ? · Do not exercise for 1 day after the test.   Medicines  ? · Your doctor will tell you if and when you can restart your medicines. He or she will also give you instructions about taking any new medicines. ? · If you take blood thinners, such as warfarin (Coumadin), clopidogrel (Plavix), or aspirin, be sure to talk to your doctor. He or she will tell you if and when to start taking those medicines again. Make sure that you understand exactly what your doctor wants you to do. ? · Take an over-the-counter pain medicine, such as acetaminophen (Tylenol), ibuprofen (Advil, Motrin), or naproxen (Aleve). Be safe with medicines. Read and follow all instructions on the label. Do not take two or more pain medicines at the same time unless the doctor told you to. Many pain medicines have acetaminophen, which is Tylenol. Too much acetaminophen (Tylenol) can be harmful. Other instructions  ? · Use a pad if you have some bleeding. ? · Do not douche, have sexual intercourse, or use tampons for 1 week if you had a biopsy.  This will allow time for your cervix to heal.   ? · You can take a bath or shower anytime after the test. Follow-up care is a key part of your treatment and safety. Be sure to make and go to all appointments, and call your doctor if you are having problems. It's also a good idea to know your test results and keep a list of the medicines you take. When should you call for help? Call your doctor now or seek immediate medical care if:  ? · You have severe vaginal bleeding. This means that you are soaking through your usual pads or tampons each hour for 2 or more hours. ? · You have pain that does not get better after you take pain medicine. ? · You have signs of infection, such as:  ¨ Increased pain. ¨ Bad-smelling vaginal discharge. ¨ A fever. ? Watch closely for any changes in your health, and be sure to contact your doctor if:  ? · You have questions or concerns. Where can you learn more? Go to http://emelina-nile.info/. Enter M523 in the search box to learn more about \"Colposcopy: What to Expect at Home. \"  Current as of: May 12, 2017  Content Version: 11.4  © 5192-7801 Healthwise[employer] Incorporated. Care instructions adapted under license by [employer] (which disclaims liability or warranty for this information). If you have questions about a medical condition or this instruction, always ask your healthcare professional. [address] any warranty or liability for your use of this information.

## 2018-06-22 NOTE — MR AVS SNAPSHOT
[address] [facility] [address] [facility] 
[phone] Patient: Maria Teresa Drew MRN: [id number] :[id number] Visit Information Date & Time Provider Department Dept. Phone Encounter #  
 2018  9:40 AM Arcelia Moran MD [facility] AT [address] [facility] [id number] Follow-up Instructions Return if symptoms worsen or fail to improve. Upcoming Health Maintenance Date Due Influenza Age 5 to Adult 2018 PAP AKA CERVICAL CYTOLOGY 2021 Allergies as of 2018  Review Complete On: 2018 By: Arcelia Moran MD  
  
 Severity Noted Reaction Type Reactions Aspirin High 2010   Side Effect Rash, Nausea and Vomiting, Myalgia Vicodin [Hydrocodone-acetaminophen] High 2013    Anaphylaxis Flexeril [Cyclobenzaprine] Medium 2010   Side Effect Nausea and Vomiting Ibuprofen  2012    Rash Ivp [Fd And C Blue No.1]  2010   Side Effect Itching Pcn [Penicillins]  2010    Rash Pt states she is allergic to all \"cillins\" Toradol [Ketorolac Tromethamine]  2010    Rash Ultram [Tramadol]  07/10/2014   Intolerance Nausea and Vomiting Pt reports headache with n/v when taking this med. Current Immunizations  Reviewed on 2015 Name Date Influenza Vaccine PF 2015  3:55 PM  
 Influenza Vaccine Whole 10/26/2009 Pneumococcal Polysaccharide (PPSV-23) 2015  3:54 PM  
 TD Vaccine 2004 Not reviewed this visit You Were Diagnosed With   
  
 Codes Comments ASCUS with positive high risk HPV cervical    -  Primary ICD-10-CM: R87.610, R87.810 ICD-9-CM: 795.01, 795.05 Screening for condition     ICD-10-CM: Z13.9 ICD-9-CM: V82.9 Vitals BP Pulse Temp Resp Height(growth percentile) Weight(growth percentile) (!) 146/109 85 96.6 °F (35.9 °C) (Oral) 16 5' 3.6\" (1.615 m) 322 lb 6.4 oz (146.2 kg) BMI OB Status Smoking Status 56.04 kg/m2 Polycystic Ovarian Syndrome Current Every Day Smoker Vitals History BMI and BSA Data Body Mass Index Body Surface Area 56.04 kg/m 2 2.56 m 2 Preferred Pharmacy Pharmacy Name Phone [address] [phone] Your Updated Medication List  
  
   
This list is accurate as of 6/22/18 10:34 AM.  Always use your most recent med list.  
  
  
  
  
 acetaminophen 325 mg tablet Commonly known as:  TYLENOL Take 2 Tabs by mouth every four (4) hours as needed for Pain. acyclovir 400 mg tablet Commonly known as:  ZOVIRAX Take 800 mg by mouth three (3) times daily. albuterol 90 mcg/actuation inhaler Commonly known as:  PROVENTIL HFA, VENTOLIN HFA, PROAIR HFA Take 1-2 puffs by inhalation every four (4) hours as needed for Wheezing. cloNIDine HCl 0.2 mg tablet Commonly known as:  CATAPRES Take 1 tablet by mouth three (3) times daily. fluticasone-salmeterol 100-50 mcg/dose diskus inhaler Commonly known as:  ADVAIR Take 1 puff by inhalation every twelve (12) hours. hydroCHLOROthiazide 12.5 mg capsule Commonly known as:  Larsen Dyers Take 12.5 mg by mouth daily. HYDROcodone-acetaminophen 7.5-325 mg per tablet Commonly known as:  Ruffus Homme Take 1 Tab by mouth every eight (8) hours as needed for Pain. insulin aspart U-100 100 unit/mL Inpn Commonly known as:  Guillen Peeling Use as directed up to 4 times daily per sliding scale: 150-200: 2 units, 201-250: 4 units, 251-300: 6 units, 301-350: 8 units, 351-400: 10 units  
  
 ketoconazole 2 % topical cream  
Commonly known as:  NIZORAL Apply  to affected area daily. labetalol 200 mg tablet Commonly known as:  [place] Take 1 Tab by mouth two (2) times a day. Indications: hypertension LEVEMIR U-100 INSULIN 100 unit/mL injection Generic drug:  insulin detemir U-100 23 Units by SubCUTAneous route nightly. levETIRAcetam 100 mg/ml Soln oral solution Commonly known as:  KEPPRA Take 15 mL by mouth two (2) times a day. LIDODERM 5 % Generic drug:  lidocaine  
by TransDERmal route every twenty-four (24) hours. Apply patch to the affected area for 12 hours a day and remove for 12 hours a day. loratadine 10 mg Cap Take  by mouth daily. MULTI VITAMIN PO Take 1 Tab by mouth daily. ondansetron 4 mg disintegrating tablet Commonly known as:  ZOFRAN ODT Take 1 Tab by mouth every eight (8) hours as needed for Nausea for up to 5 doses. promethazine 25 mg tablet Commonly known as:  PHENERGAN Take 1 Tab by mouth every six (6) hours as needed. We Performed the Following AMB POC URINE PREGNANCY TEST, VISUAL COLOR COMPARISON [18734 CPT(R)] COLPOSCOPY,CERVIX W/ADJ VAGINA, [address]  CPT(R)] SURGICAL PATHOLOGY [NWW2610 Custom] Follow-up Instructions Return if symptoms worsen or fail to improve. Patient Instructions Abnormal Pap Test: Care Instructions Your Care Instructions A Pap test (also called a Pap smear) is used to look for early changes that may become cancer of the cervix. Your Pap test was abnormal. That may mean that some cells in your cervix have changed. The cell changes are most often caused by human papillomavirus (HPV) infection. An abnormal Pap test does not mean that the abnormal cells will lead to cancer. Changes in cervical cells may go away on their own or may progress slowly. Your doctor may want to repeat the Pap test or have you take other tests to see if you have cell changes. It is very important that you have regular Pap tests after you've had an abnormal Pap test. 
Follow-up care is a key part of your treatment and safety.  Be sure to make and go to all appointments, and call your doctor if you are having problems. It's also a good idea to know your test results and keep a list of the medicines you take. How can you care for yourself at home? · Do not smoke. Smoking may increase your risk for cervical cell changes. If you need help quitting, talk to your doctor about stop-smoking programs and medicines. These can increase your chances of quitting for good. · Be sure to get follow-up Pap tests or other follow-up tests as recommended by your doctor. When should you call for help? Watch closely for changes in your health, and be sure to contact your doctor if you have any problems. Where can you learn more? Go to http://emelinaParrablenile.info/. Enter P925 in the search box to learn more about \"Abnormal Pap Test: Care Instructions. \" Current as of: May 12, 2017 Content Version: 11.4 © 0234-7681 [employer]. Care instructions adapted under license by [employer] (which disclaims liability or warranty for this information). If you have questions about a medical condition or this instruction, always ask your healthcare professional. [address] any warranty or liability for your use of this information. Colposcopy: What to Expect at [facility] Your Recovery You may feel some soreness in your vagina for a day or two if you had a biopsy. Some vaginal bleeding or discharge is normal for up to a week after a biopsy. The discharge may be dark-colored if a solution was put on your cervix. You can use a sanitary pad for the bleeding. It may take a week or two for you to get the test results. This care sheet gives you a general idea about how long it will take for you to recover. But each person recovers at a different pace. Follow the steps below to feel better as quickly as possible. How can you care for yourself at home? Activity ? · You can return to work and most daily activities right after the test.  
Exercise ? · Do not exercise for 1 day after the test.  
Medicines ? · Your doctor will tell you if and when you can restart your medicines. He or she will also give you instructions about taking any new medicines. ? · If you take blood thinners, such as warfarin (Coumadin), clopidogrel (Plavix), or aspirin, be sure to talk to your doctor. He or she will tell you if and when to start taking those medicines again. Make sure that you understand exactly what your doctor wants you to do. ? · Take an over-the-counter pain medicine, such as acetaminophen (Tylenol), ibuprofen (Advil, Motrin), or naproxen (Aleve). Be safe with medicines. Read and follow all instructions on the label. Do not take two or more pain medicines at the same time unless the doctor told you to. Many pain medicines have acetaminophen, which is Tylenol. Too much acetaminophen (Tylenol) can be harmful. Other instructions ? · Use a pad if you have some bleeding. ? · Do not douche, have sexual intercourse, or use tampons for 1 week if you had a biopsy. This will allow time for your cervix to heal.  
? · You can take a bath or shower anytime after the test.  
Follow-up care is a key part of your treatment and safety. Be sure to make and go to all appointments, and call your doctor if you are having problems. It's also a good idea to know your test results and keep a list of the medicines you take. When should you call for help? Call your doctor now or seek immediate medical care if: 
? · You have severe vaginal bleeding. This means that you are soaking through your usual pads or tampons each hour for 2 or more hours. ? · You have pain that does not get better after you take pain medicine. ? · You have signs of infection, such as: 
¨ Increased pain. ¨ Bad-smelling vaginal discharge. ¨ A fever. ? Watch closely for any changes in your health, and be sure to contact your doctor if: 
? · You have questions or concerns. Where can you learn more? Go to http://emelina-nile.info/. Enter M523 in the search box to learn more about \"Colposcopy: What to Expect at Home. \" Current as of: May 12, 2017 Content Version: 11.4 © 0780-5645 Healthwise, Incorporated. Care instructions adapted under license by [employer] (which disclaims liability or warranty for this information). If you have questions about a medical condition or this instruction, always ask your healthcare professional. [address] any warranty or liability for your use of this information. Introducing [facility]! [employer] introduces ATOMOO patient portal. Now you can access parts of your medical record, email your doctor's office, and request medication refills online. 1. In your internet browser, go to [email or web address]"[email or web address]. Epunchit/"[employer] 2. Click on the First Time User? Click Here link in the Sign In box. You will see the New Member Sign Up page. 3. Enter your ATOMOO Access Code exactly as it appears below. You will not need to use this code after youve completed the sign-up process. If you do not sign up before the expiration date, you must request a new code. · ATOMOO Access Code: [email or web address] Expires: 7/8/2018 10:39 AM 
 
4. Enter the last four digits of your Social Security Number (xxxx) and Date of Birth (mm/dd/yyyy) as indicated and click Submit. You will be taken to the next sign-up page. 5. Create a ATOMOO ID. This will be your ATOMOO login ID and cannot be changed, so think of one that is secure and easy to remember. 6. Create a ATOMOO password. You can change your password at any time. 7. Enter your Password Reset Question and Answer. This can be used at a later time if you forget your password. 8. Enter your e-mail address. You will receive e-mail notification when new information is available in [address] 9. Click Sign Up. You can now view and download portions of your medical record. 10. Click the Download Summary menu link to download a portable copy of your medical information. If you have questions, please visit the Frequently Asked Questions section of the SaludFÃ[employer]CIL website. Remember, SaludFÃ[employer]CIL is NOT to be used for urgent needs. For medical emergencies, dial 911. Now available from your iPhone and Android! Please provide this summary of care documentation to your next provider. Your primary care clinician is listed as Tati Bragg. If you have any questions after today's visit, please call [phone].

## 2018-06-22 NOTE — PROGRESS NOTES
Pt is here for a colposcopy. BP is elevated, 146/109, pt encouraged to follow up with PCP. No headache or changes in vision.   MARIA ESTHER SMITH AT [facility]  OFFICE PROCEDURE PROGRESS NOTE        Chart reviewed for the following:   Bren Louise, have reviewed the History, Physical and updated the Allergic reactions for Laura A [address] performed immediately prior to start of procedure:   I, Silva Ernandez, have performed the following reviews on [address] prior to the start of the procedure:            * Patient was identified by name and date of birth   * Agreement on procedure being performed was verified  * Risks and Benefits explained to the patient  * Procedure site verified and marked as necessary  * Patient was positioned for comfort  * Consent was signed and verified     Time: 10.30am      Date of procedure: 6/22/2018    Procedure performed by:  Jonnie Perez MD    Provider assisted by:  MA    Patient assisted by: self    How tolerated by patient: tolerated the procedure well with no complications    Post Procedural Pain Scale: 0 - No Hurt    Comments: none

## 2018-06-22 NOTE — PROGRESS NOTES
35 y.o. who presents for colposcopy. Patient with ASCUS HR HPV positive pap smear    No prior hx of abnormal pap smear. Patient desires Nexplanon for AUB. Will place at next visit. Doing well without complaints. Review of symptoms:  Constitutional: negative  Urinary: negative  CV: negative    Neuro: negative  Resp: negative   Psych: negative  GI: negative    Musculoskeletal: negative  GYN: per HPI    Integumentary: negative    Physical exam:    Gen: AOx3, NAD  Resp: No respiratory distress  GYN: normal external genitalia. Normal vagina. Grossly normal cervix.     A/P  35 y.o. with ASCUS HR HPV positive pap smear  - colposcopy today    Return to clinic for Nexplanon insertion

## 2018-06-26 NOTE — PROGRESS NOTES
Please let patient know that the biopsies showed only low grade changes, which do not require action other than screening in 1 year. Will repeat the pap smear and HPV testing in 1 year. Thank you.

## 2018-06-28 ENCOUNTER — TELEPHONE (OUTPATIENT)
Dept: OBGYN CLINIC | Age: 33
End: 2018-06-28

## 2018-06-28 NOTE — TELEPHONE ENCOUNTER
----- Message from Vikki Antonio MD sent at 6/26/2018 10:55 AM EDT -----  Please let patient know that the biopsies showed only low grade changes, which do not require action other than screening in 1 year. Will repeat the pap smear and HPV testing in 1 year. Thank you.

## 2018-06-29 ENCOUNTER — TELEPHONE (OUTPATIENT)
Dept: OBGYN CLINIC | Age: 33
End: 2018-06-29

## 2018-06-29 NOTE — TELEPHONE ENCOUNTER
Pt called back for test results, informed that her biopsies showed only low grade changes which do not require action other that a repap and HPV testing in one year. Understanding voiced by pt.

## 2018-07-30 ENCOUNTER — DOCUMENTATION ONLY (OUTPATIENT)
Dept: OBGYN CLINIC | Age: 33
End: 2018-07-30

## 2018-07-30 NOTE — PROGRESS NOTES
If she is not approved for Nexplanon, will try cyclic provera and if this does not improve symptoms, will try to get nexplanon approved.   Thank you

## 2018-07-30 NOTE — PROGRESS NOTES
Received a voicemail from [place] at [facility] stating that the patient needs a prior authorization for her Nexplanon. I called [phone] and requested a prior auth form be faxed for this.

## 2018-10-04 ENCOUNTER — TELEPHONE (OUTPATIENT)
Dept: OBGYN CLINIC | Age: 33
End: 2018-10-04

## 2018-10-04 NOTE — TELEPHONE ENCOUNTER
Pt called to find out what the next step is since she was told that her nexplanon was denied. I see an appeals form was completed by Katherine Aguilar and sent on the required date. No other forrespondence noted. Please call [facility] at the number on the form (found in Media tab) to get update of appeal. Scheduled pt for appt with Dr. Karsten Elizondo on 10/12/18 to discuss possible change of treatment.

## 2018-10-15 ENCOUNTER — TELEPHONE (OUTPATIENT)
Dept: OBGYN CLINIC | Age: 33
End: 2018-10-15

## 2018-10-15 NOTE — TELEPHONE ENCOUNTER
Spoke with Igor Ramirez from [employer] (regarding a previous denial for Nexplanon) who stated that family planning is not covered by [employer].

## 2018-11-07 ENCOUNTER — OFFICE VISIT (OUTPATIENT)
Dept: OBGYN CLINIC | Age: 33
End: 2018-11-07

## 2018-11-07 VITALS
SYSTOLIC BLOOD PRESSURE: 163 MMHG | WEIGHT: 293 LBS | RESPIRATION RATE: 18 BRPM | TEMPERATURE: 96.1 F | BODY MASS INDEX: 50.02 KG/M2 | HEART RATE: 73 BPM | HEIGHT: 64 IN | DIASTOLIC BLOOD PRESSURE: 111 MMHG

## 2018-11-07 DIAGNOSIS — N93.9 ABNORMAL UTERINE BLEEDING (AUB): Primary | ICD-10-CM

## 2018-11-07 DIAGNOSIS — R11.0 NAUSEA: ICD-10-CM

## 2018-11-07 RX ORDER — ONDANSETRON 4 MG/1
4 TABLET, ORALLY DISINTEGRATING ORAL
Qty: 20 TAB | Refills: 4 | Status: SHIPPED | OUTPATIENT
Start: 2018-11-07 | End: 2019-10-13

## 2018-11-07 RX ORDER — MEDROXYPROGESTERONE ACETATE 10 MG/1
10 TABLET ORAL DAILY
Qty: 30 TAB | Refills: 12 | Status: SHIPPED | OUTPATIENT
Start: 2018-11-07 | End: 2018-12-07

## 2018-11-07 NOTE — PROGRESS NOTES
Pt is here to discuss other alternatives for her heavy and long cycles. Her nexplanon prescription was denied by insurance. Her BP is very elevated, 163/111, pt states that she has been feeling dizzy with nausea and blurred vision. Pt states that her BP was elevated when she went to her PCP. She has an appt next month, pt encouraged to call her PCP today about her BP.

## 2018-11-07 NOTE — PROGRESS NOTES
35 y.o. who presents for follow up of AUB. Patient was not approved for Nexplanon by insurance, most likely due to her tubes being tied. Patient currently bleeding. Having some cramping and nausea. Does not want to go on Depo due to weight. Not candidate for estrogen containing medications due to hx of blood clots. Does not want to have any period if possible. Review of symptoms:  Constitutional: negative  Urinary: negative  CV: negative    Neuro: negative  Resp: negative   Psych: negative  GI: negative    Musculoskeletal: negative  GYN: per HPI    Integumentary: negative    Physical exam:    Gen: AOx3, NAD  Resp: No respiratory distress  GYN: Deferred    A/P  35 y.o. with AUB  - discussed options with patient at length. She opted for continuous provera. Will start 10mg daily  - zofran PRN for nausea    Return to clinic PRN or for annual exam (follow up pap)    Spent greater than 20 minutes with patient, over 50% of which was spent counselling.

## 2018-11-07 NOTE — PATIENT INSTRUCTIONS
Medroxyprogesterone (By mouth)   Medroxyprogesterone ([id number]-[id number]-ROMY-ter-one)  Treats menstruation problems caused by a hormone imbalance. Prevents overgrowth of the uterine lining in women who are taking estrogen. Brand Name(s): Provera   There may be other brand names for this medicine. When This Medicine Should Not Be Used: This medicine is not right for everyone. Do not use it if you had an allergic reaction to medroxyprogesterone, if you are pregnant, or if you have liver disease, unusual vaginal bleeding that has not been checked by a doctor, or a history of breast cancer or blood clots. How to Use This Medicine:   Tablet  · Take your medicine as directed. Your dose may need to be changed several times to find what works best for you. · Read and follow the patient instructions that come with this medicine. Talk to your doctor or pharmacist if you have any questions. · Missed dose: Take a dose as soon as you remember. If it is almost time for your next dose, wait until then and take a regular dose. Do not take extra medicine to make up for a missed dose. · Store the medicine in a closed container at room temperature, away from heat, moisture, and direct light. Drugs and Foods to Avoid:      Ask your doctor or pharmacist before using any other medicine, including over-the-counter medicines, vitamins, and herbal products. Warnings While Using This Medicine:   · It is not safe to take this medicine during pregnancy. It could harm an unborn baby. Tell your doctor right away if you become pregnant. · Tell your doctor if you are breastfeeding or if you have kidney disease, heart disease, asthma, diabetes, endometriosis, high blood pressure, high cholesterol, lupus, porphyria, migraines, thyroid problems, or a history of seizures or cancer. Tell your doctor if you smoke.   · This medicine may increase your risk for the following:   ¨ Blood clots, which could lead to stroke or heart attack  ¨ Dementia (when used with estrogen in women older than 72)  ¨ Breast or endometrial cancer (when used with estrogen)  · Tell any doctor who treats you that you use this medicine. You may need to stop taking it before you have surgery or if you need to be on bedrest.  · Tell any doctor or dentist who treats you that you are using this medicine. This medicine may affect certain medical test results. · Your doctor will check your progress and the effects of this medicine at regular visits. Keep all appointments. · Keep all medicine out of the reach of children. Never share your medicine with anyone. Possible Side Effects While Using This Medicine:   Call your doctor right away if you notice any of these side effects:  · Allergic reaction: Itching or hives, swelling in your face or hands, swelling or tingling in your mouth or throat, chest tightness, trouble breathing  · Breast lump, pain, or tenderness  · Chest pain, trouble breathing, coughing up blood  · Loss of vision, blurred vision  · Numbness or weakness on one side of your body, sudden or severe headache, problems with speech or walking, pain in your lower leg (calf)  · Rapid weight gain, swelling in your hands, ankles, or feet  · Severe or unusual vaginal bleeding  · Sudden and severe stomach pain, nausea, vomiting, fever, lightheadedness  · Yellow skin or eyes  If you notice these less serious side effects, talk with your doctor:   · Depression, headache, dizziness, trouble sleeping  · Light vaginal bleeding or spotting  · Mild stomach pain or cramps  If you notice other side effects that you think are caused by this medicine, tell your doctor. Call your doctor for medical advice about side effects. You may report side effects to FDA at 9-821-FDA-1392  © 2017 [facility] Information is for End User's use only and may not be sold, redistributed or otherwise used for commercial purposes.   The above information is an  only. It is not intended as medical advice for individual conditions or treatments. Talk to your doctor, nurse or pharmacist before following any medical regimen to see if it is safe and effective for you.

## 2018-11-29 ENCOUNTER — TELEPHONE (OUTPATIENT)
Dept: OBGYN CLINIC | Age: 33
End: 2018-11-29

## 2018-11-29 NOTE — TELEPHONE ENCOUNTER
Relative called office reporting pt feels like she is going to pass out and pt is passing quarter sized clots for the passed two days. Reports that pt is also using 2 pads within a hour  Called relative back and instructed pt to go to the ER per Dr. Ivery Babinski.

## 2018-11-29 NOTE — TELEPHONE ENCOUNTER
Pt called reporting heavy menstrual cycle; pt states this is the second cycle this month. Pt was seen in the office on 11/7 and was started on Provera 10 mg daily.   Per  instructed pt to take 20 mg daily to slow down the bleeding

## 2018-12-03 ENCOUNTER — TELEPHONE (OUTPATIENT)
Dept: OBGYN CLINIC | Age: 33
End: 2018-12-03

## 2018-12-03 ENCOUNTER — HOSPITAL ENCOUNTER (EMERGENCY)
Age: 33
Discharge: HOME | End: 2018-12-03
Attending: EMERGENCY MEDICINE
Payer: MEDICARE

## 2018-12-03 VITALS
BODY MASS INDEX: 51.91 KG/M2 | HEART RATE: 89 BPM | TEMPERATURE: 97.3 F | OXYGEN SATURATION: 94 % | HEIGHT: 63 IN | DIASTOLIC BLOOD PRESSURE: 104 MMHG | SYSTOLIC BLOOD PRESSURE: 144 MMHG | RESPIRATION RATE: 18 BRPM | WEIGHT: 293 LBS

## 2018-12-03 DIAGNOSIS — N93.9 ABNORMAL UTERINE BLEEDING (AUB): Primary | ICD-10-CM

## 2018-12-03 LAB
ANION GAP BLD CALC-SCNC: 16 MMOL/L (ref 10–20)
BUN BLD-MCNC: 8 MG/DL (ref 9–20)
CA-I BLD-MCNC: 1.09 MMOL/L (ref 1.12–1.32)
CHLORIDE BLD-SCNC: 103 MMOL/L (ref 98–107)
CO2 BLD-SCNC: 25 MMOL/L (ref 21–32)
CREAT BLD-MCNC: 0.6 MG/DL (ref 0.6–1.3)
GLUCOSE BLD-MCNC: 106 MG/DL (ref 65–100)
HCG UR QL: NEGATIVE
HCT VFR BLD CALC: 31 % (ref 35–47)
POTASSIUM BLD-SCNC: 3.6 MMOL/L (ref 3.5–5.1)
SERVICE CMNT-IMP: ABNORMAL
SODIUM BLD-SCNC: 140 MMOL/L (ref 136–145)

## 2018-12-03 PROCEDURE — 81025 URINE PREGNANCY TEST: CPT

## 2018-12-03 PROCEDURE — 99284 EMERGENCY DEPT VISIT MOD MDM: CPT

## 2018-12-03 PROCEDURE — 80047 BASIC METABLC PNL IONIZED CA: CPT

## 2018-12-03 NOTE — DISCHARGE INSTRUCTIONS
Abnormal Uterine Bleeding: Care Instructions  Your Care Instructions    Abnormal uterine bleeding (AUB) is irregular bleeding from the uterus that is longer or heavier than usual or does not occur at your regular time. Sometimes it is caused by changes in hormone levels. It can also be caused by growths in the uterus, such as fibroids or polyps. Sometimes a cause cannot be found. You may have heavy bleeding when you are not expecting your period. Your doctor may suggest a pregnancy test, if you think you are pregnant. Follow-up care is a key part of your treatment and safety. Be sure to make and go to all appointments, and call your doctor if you are having problems. It's also a good idea to know your test results and keep a list of the medicines you take. How can you care for yourself at home? · Be safe with medicines. Take pain medicines exactly as directed. ? If the doctor gave you a prescription medicine for pain, take it as prescribed. ? If you are not taking a prescription pain medicine, ask your doctor if you can take an over-the-counter medicine. · You may be low in iron because of blood loss. Eat a balanced diet that is high in iron and vitamin C. Foods rich in iron include red meat, shellfish, eggs, beans, and leafy green vegetables. Talk to your doctor about whether you need to take iron pills or a multivitamin. When should you call for help? Call 911 anytime you think you may need emergency care. For example, call if:    · You passed out (lost consciousness).    Call your doctor now or seek immediate medical care if:    · You have new or worse belly or pelvic pain.     · You have severe vaginal bleeding.     · You feel dizzy or lightheaded, or you feel like you may faint.    Watch closely for changes in your health, and be sure to contact your doctor if:    · You think you may be pregnant.     · Your bleeding gets worse.     · You do not get better as expected.    Where can you learn more?  Go to http://emelina-nile.info/. Enter L204 in the search box to learn more about \"Abnormal Uterine Bleeding: Care Instructions. \"  Current as of: May 15, 2018  Content Version: 11.8  © 6373-0274 Healthwise, [employer]. Care instructions adapted under license by [employer] (which disclaims liability or warranty for this information). If you have questions about a medical condition or this instruction, always ask your healthcare professional. [place] [address] any warranty or liability for your use of this information.

## 2018-12-03 NOTE — ED NOTES
Discharge instructions were given to the patient by ZORA Faye. The patient left the Emergency Department ambulatory, alert and oriented and in no acute distress with 0 prescriptions. The patient was encouraged to call or return to the ED for worsening issues or problems and was encouraged to schedule a follow up appointment for continuing care. The patient verbalized understanding of discharge instructions and prescriptions, all questions were answered. The patient has no further concerns at this time.

## 2018-12-03 NOTE — ED TRIAGE NOTES
Complains of heavy menstrual cycle x 1 months with clots and pain. OBGYN placed her on medication to correct cycle but since has worsened.

## 2018-12-03 NOTE — TELEPHONE ENCOUNTER
Spoke with pt, told her per , to increase Provera to 2 pills per day, if she is still having symptoms she may go to [facility]. Pt then said she started taking 2 pills/day a week ago, but is still having problems. Pt also wanted to know if Dr. Lorena White is at [facility], Constanza Mike goes to [facility], will Dr. Lorena White, be coming down to see her in [facility]? Pt told all information will be clarified with Dr. Lorena White and to expect a return call from the office. Pt voiced understanding. The office was notified that the pt had gone to [facility] for evaluation. Dr. Lorena White was made aware.

## 2018-12-03 NOTE — TELEPHONE ENCOUNTER
Pt called reporting vaginal bleeding that has gotten worse over the weekend. Pt was told to go to the hospital on 11/29. Pt stated she did go to [facility] that day but was sent home and told to follow up with her OB GYN. Today pt reports clots that are the size of pears and using 5 or more pads in 1 hour. Per ANTONY Ram pt was told to go to the ED at [address] where Lula Conklin is work at today.
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
## 2018-12-04 ENCOUNTER — DOCUMENTATION ONLY (OUTPATIENT)
Dept: OBGYN CLINIC | Age: 33
End: 2018-12-04

## 2018-12-04 RX ORDER — MEGESTROL ACETATE 40 MG/1
40 TABLET ORAL DAILY
Qty: 30 TAB | Refills: 5 | OUTPATIENT
Start: 2018-12-04 | End: 2018-12-06 | Stop reason: SDUPTHER

## 2018-12-04 NOTE — PROGRESS NOTES
Pt called back educated her about the new mediation and to stop Provera per  note.  Pt voiced no questions/concerns

## 2018-12-04 NOTE — PROGRESS NOTES
Please let patient know that I am changing her from Provera to Megace. She should take this daily to stop her bleeding. Her blood count at the ED yesterday was slightly low. Will also start on an iron pill. Both will be sent to her preferred pharmacy. She should stop the provera.

## 2018-12-05 ENCOUNTER — OFFICE VISIT (OUTPATIENT)
Dept: SURGERY | Age: 33
End: 2018-12-05

## 2018-12-05 VITALS
TEMPERATURE: 97.4 F | DIASTOLIC BLOOD PRESSURE: 93 MMHG | RESPIRATION RATE: 18 BRPM | BODY MASS INDEX: 51.91 KG/M2 | WEIGHT: 293 LBS | SYSTOLIC BLOOD PRESSURE: 145 MMHG | HEART RATE: 83 BPM | HEIGHT: 63 IN

## 2018-12-05 DIAGNOSIS — N93.9 ABNORMAL UTERINE BLEEDING (AUB): Primary | ICD-10-CM

## 2018-12-05 DIAGNOSIS — R10.2 PELVIC PAIN: ICD-10-CM

## 2018-12-05 DIAGNOSIS — N93.9 ABNORMAL UTERINE BLEEDING: Primary | ICD-10-CM

## 2018-12-05 DIAGNOSIS — R10.2 PELVIC PAIN IN FEMALE: ICD-10-CM

## 2018-12-05 RX ORDER — OXYCODONE AND ACETAMINOPHEN 5; 325 MG/1; MG/1
1 TABLET ORAL
Qty: 12 TAB | Refills: 0 | Status: SHIPPED | OUTPATIENT
Start: 2018-12-05 | End: 2019-10-13

## 2018-12-05 RX ORDER — NAPROXEN 500 MG/1
500 TABLET ORAL
Qty: 60 TAB | Refills: 1 | Status: ON HOLD | OUTPATIENT
Start: 2018-12-05 | End: 2018-12-10

## 2018-12-05 NOTE — PATIENT INSTRUCTIONS
Dilation and Curettage: Before Your Procedure  What is dilation and curettage? Dilation and curettage is a type of procedure. It is often called a D&C. It removes tissue from inside your uterus. The doctor may do this to find out if the tissue is not normal. Or it might be done to stop severe bleeding. Sometimes it's done to treat a miscarriage. Your doctor may give you medicine to make you sleep or help you relax. You may also get medicine to help with pain. First, the doctor inserts a tool into your vagina to gently spread it open. Then he or she uses another tool to open the lower part of your uterus. This is called your cervix. Next, the doctor gently scrapes tissue from the uterus with a different tool. This tool may be attached to a vacuum to help suck out the tissue. The procedure usually takes 15 to 30 minutes. You will probably go home the same day. Most women are still able to get pregnant after a D&C. Follow-up care is a key part of your treatment and safety. Be sure to make and go to all appointments, and call your doctor if you are having problems. It's also a good idea to know your test results and keep a list of the medicines you take. What happens before the procedure?   Preparing for the procedure    · Understand exactly what procedure is planned, along with the risks, benefits, and other options. · Tell your doctors ALL the medicines, vitamins, supplements, and herbal remedies you take. Some of these can increase the risk of bleeding or interact with anesthesia.     · If you take blood thinners, such as warfarin (Coumadin), clopidogrel (Plavix), or aspirin, be sure to talk to your doctor. He or she will tell you if you should stop taking these medicines before your procedure. Make sure that you understand exactly what your doctor wants you to do.     · Your doctor will tell you which medicines to take or stop before your procedure.  You may need to stop taking certain medicines a week or more before the procedure. So talk to your doctor as soon as you can.     · If you have an advance directive, let your doctor know. It may include a living will and a durable power of  for health care. Bring a copy to the hospital. If you don't have one, you may want to prepare one. It lets your doctor and loved ones know your health care wishes. Doctors advise that everyone prepare these papers before any type of surgery or procedure.     · You may go to your doctor's office on the day before the procedure. Your doctor may put a small sponge in your cervix or tablets behind your cervix. This helps to open it. Or the doctor may give you a pill to help open the cervix. Procedures can be stressful. This information will help you understand what you can expect. And it will help you safely prepare for your procedure. What happens on the day of the procedure? · Follow the instructions exactly about when to stop eating and drinking. If you don't, your procedure may be canceled. If your doctor told you to take your medicines on the day of the procedure, take them with only a sip of water.     · Take a bath or shower before you come in for your procedure. Do not apply lotions, perfumes, deodorants, or nail polish.     · Take off all jewelry and piercings. And take out contact lenses, if you wear them.    At the hospital or surgery center   · Bring a picture ID.     · You will be kept comfortable and safe by your anesthesia provider. You may get medicine that relaxes you or puts you in a light sleep.     · The procedure usually takes 15 to 30 minutes. Going home   · Be sure you have someone to drive you home. Anesthesia and pain medicine make it unsafe for you to drive.     · You will be given more specific instructions about recovering from your procedure. They will cover things like diet, wound care, follow-up care, driving, and getting back to your normal routine. When should you call your doctor? · You have questions or concerns.     · You don't understand how to prepare for your procedure.     · You become ill before the procedure (such as fever, flu, or a cold).     · You need to reschedule or have changed your mind about having the procedure. Where can you learn more? Go to http://emelina-nile.info/. Enter X505 in the search box to learn more about \"Dilation and Curettage: Before Your Procedure. \"  Current as of: November 21, 2017  Content Version: 11.8  © 0327-6517 [employer]. Care instructions adapted under license by [employer] (which disclaims liability or warranty for this information). If you have questions about a medical condition or this instruction, always ask your healthcare professional. [address] any warranty or liability for your use of this information. Dilation and Curettage: What to Expect at Home  Your Recovery  Dilation and curettage (D&C) is a procedure to remove tissue from the inside of the uterus. The doctor used a curved tool, called a curette, to gently scrape tissue from your uterus. You are likely to have a backache, or cramps similar to menstrual cramps, and pass small clots of blood from your vagina for the first few days. You may continue to have light vaginal bleeding for several weeks after the procedure. You will probably be able to go back to most of your normal activities in 1 or 2 days. This care sheet gives you a general idea about how long it will take for you to recover. But each person recovers at a different pace. Follow the steps below to get better as quickly as possible. How can you care for yourself at home? Activity    · Rest when you feel tired.  Getting enough sleep will help you recover.     · Avoid strenuous activities, such as bicycle riding, jogging, weight lifting, or aerobic exercise, until your doctor says it is okay.     · Most women are able to return to work the day after the procedure.     · You may have some light vaginal bleeding. Wear sanitary pads if needed. Do not douche or use tampons for 2 weeks or until your doctor says it is okay.     · Ask your doctor when it is okay for you to have sex.     · If you could become pregnant, talk about birth control with your doctor. Do not try to become pregnant until your doctor says it is okay. Diet    · You can eat your normal diet. If your stomach is upset, try bland, low-fat foods like plain rice, broiled chicken, toast, and yogurt.     · Drink plenty of fluids (unless your doctor tells you not to). Medicines    · Your doctor will tell you if and when you can restart your medicines. He or she will also give you instructions about taking any new medicines.     · If you take blood thinners, such as warfarin (Coumadin), clopidogrel (Plavix), or aspirin, be sure to talk to your doctor. He or she will tell you if and when to start taking those medicines again. Make sure that you understand exactly what your doctor wants you to do.     · Be safe with medicines. Take pain medicines exactly as directed. ? If the doctor gave you a prescription medicine for pain, take it as prescribed. ? If you are not taking a prescription pain medicine, ask your doctor if you can take an over-the-counter medicine.     · If you think your pain medicine is making you sick to your stomach:  ? Take your medicine after meals (unless your doctor has told you not to). ? Ask your doctor for a different pain medicine.     · If your doctor prescribed antibiotics, take them as directed. Do not stop taking them just because you feel better. You need to take the full course of antibiotics. Follow-up care is a key part of your treatment and safety. Be sure to make and go to all appointments, and call your doctor if you are having problems. It's also a good idea to know your test results and keep a list of the medicines you take.   When should you call for help?  Call 911 anytime you think you may need emergency care. For example, call if:    · You passed out (lost consciousness).     · You have chest pain, are short of breath, or cough up blood.    Call your doctor now or seek immediate medical care if:    · You have bright red vaginal bleeding that soaks one or more pads in an hour, or you have large clots.     · You have vaginal discharge that increases in amount or smells bad.     · You are sick to your stomach or cannot drink fluids.     · You have pain that does not get better after you take pain medicine.     · You cannot pass stools or gas.     · You have symptoms of a blood clot in your leg (called a deep vein thrombosis), such as:  ? Pain in your calf, back of the knee, thigh, or groin. ? Redness and swelling in your leg.     · You have signs of infection, such as:  ? Increased pain, swelling, warmth, or redness. ? Red streaks leading from the area. ? Pus draining from the area. ? A fever.    Watch closely for changes in your health, and be sure to contact your doctor if you have any problems. Where can you learn more? Go to http://emelina-nile.info/. Enter [phone] in the search box to learn more about \"Dilation and Curettage: What to Expect at Home. \"  Current as of: November 21, 2017  Content Version: 11.8  © 7200-7598 [employer]. Care instructions adapted under license by [employer] (which disclaims liability or warranty for this information). If you have questions about a medical condition or this instruction, always ask your healthcare professional. [place] [address] any warranty or liability for your use of this information. Hysteroscopy With Dilation and Curettage: What to Expect at [facility]  For a hysteroscopy, your doctor guides a lighted tube through your cervix and into your uterus. This helps the doctor see inside your uterus.   For a dilation and curettage (D&C), your doctor uses a curved tool, called a curette, to gently scrape tissue from your uterus. After these procedures, you are likely to have a backache or cramps similar to menstrual cramps. Expect to pass small clots of blood from your vagina for the first few days. You may have light vaginal bleeding for several weeks after the D&C. If the doctor filled your uterus with air, you may have gas pains or your belly may feel full. You may also have shoulder pain. These symptoms should go away in 1 to 2 days. You will probably be able to go back to most of your normal activities in 1 or 2 days. This care sheet gives you a general idea about how long it will take for you to recover. But each person recovers at a different pace. Follow the steps below to get better as quickly as possible. How can you care for yourself at home? Activity    · Rest when you feel tired.     · Most women are able to return to work the day after the procedure.     · Wear sanitary pads if needed. Do not douche or use tampons for 2 weeks or until your doctor says it is okay.     · Ask your doctor when it is okay for you to have sex.     · If you could become pregnant, talk about birth control with your doctor. Do not try to become pregnant until your doctor says it is okay. Diet    · You can eat your normal diet. If your stomach is upset, try bland, low-fat foods like plain rice, broiled chicken, toast, and yogurt.     · Drink plenty of fluids (unless your doctor tells you not to). Medicines    · Your doctor will tell you if and when you can restart your medicines. He or she will also give you instructions about taking any new medicines.     · If you take blood thinners, such as warfarin (Coumadin), clopidogrel (Plavix), or aspirin, be sure to talk to your doctor. He or she will tell you if and when to start taking those medicines again. Make sure that you understand exactly what your doctor wants you to do.     · Be safe with medicines. Read and follow all instructions on the label. ? If the doctor gave you a prescription medicine for pain, take it as prescribed. ? If you are not taking a prescription pain medicine, ask your doctor if you can take an over-the-counter medicine.     · If your doctor prescribed antibiotics, take them as directed. Do not stop taking them just because you feel better. You need to take the full course of antibiotics. Follow-up care is a key part of your treatment and safety. Be sure to make and go to all appointments, and call your doctor if you are having problems. It's also a good idea to know your test results and keep a list of the medicines you take. When should you call for help? Call 911 anytime you think you may need emergency care. For example, call if:    · You passed out (lost consciousness).     · You have chest pain, are short of breath, or cough up blood.    Call your doctor now or seek immediate medical care if:    · You have pain that does not get better after you take pain medicine.     · You cannot pass stools or gas.     · You have bright red vaginal bleeding that soaks one or more pads in an hour, or you have large clots.     · You have a vaginal discharge that has increased or that smells bad.     · You are sick to your stomach or cannot drink fluids.     · You have symptoms of a blood clot in your leg (called a deep vein thrombosis), such as:  ? Pain in your calf, back of the knee, thigh, or groin. ? Redness and swelling in your leg.     · You have signs of infection, such as:  ? Increased pain, swelling, warmth, or redness. ? Red streaks leading from the incision. ? Pus draining from the incision. ? A fever.    Watch closely for changes in your health, and be sure to contact your doctor if you have any problems. Where can you learn more? Go to http://emelina-nile.info/.   Enter A363 in the search box to learn more about \"Hysteroscopy With Dilation and Curettage: What to Expect at Home. \"  Current as of: November 21, 2017  Content Version: 11.8  © 4753-0846 [employer]. Care instructions adapted under license by [employer] (which disclaims liability or warranty for this information). If you have questions about a medical condition or this instruction, always ask your healthcare professional. [place] [address] any warranty or liability for your use of this information.

## 2018-12-05 NOTE — PROGRESS NOTES
Chief Complaint   Patient presents with    Vaginal Bleeding     x 2 months     Pt presents in office with complaints of heavy constant vaginal bleeding x 2 months. Pt reports she's passing golf ball size clots and having bilateral pelvic and back pain. 1. Have you been to the ER, urgent care clinic since your last visit? Hospitalized since your last visit? yes    2. Have you seen or consulted any other health care providers outside of the [facility] [place] [address] Aneesh since your last visit?   Include any pap smears or colon screening. yes, [facility] on 12/3/18 for heavy vaginal bleeding
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
## 2018-12-06 ENCOUNTER — TELEPHONE (OUTPATIENT)
Dept: OBGYN CLINIC | Age: 33
End: 2018-12-06

## 2018-12-06 RX ORDER — MEGESTROL ACETATE 40 MG/1
40 TABLET ORAL DAILY
Qty: 30 TAB | Refills: 5 | Status: SHIPPED | OUTPATIENT
Start: 2018-12-06 | End: 2020-07-01

## 2018-12-06 NOTE — TELEPHONE ENCOUNTER
Spoke to pt, informed her that her surgery at [facility] needs to be rescheduled to [facility] because of her high BMI (over 48) per anesthesiologist Dr. Gian Gallagher. Dr. Alejo Schmitt given all info. Surgery r/s to [facility] on 12/10/18 at 2.30pm, pt needs to arrive by 12.30pm to get prepped for surgery. Also has a preadmission testing appt on 12/7/18 at 1.00pm. Understanding voiced by pt.

## 2018-12-07 ENCOUNTER — TELEPHONE (OUTPATIENT)
Dept: CARDIOLOGY CLINIC | Age: 33
End: 2018-12-07

## 2018-12-07 ENCOUNTER — HOSPITAL ENCOUNTER (OUTPATIENT)
Dept: PREADMISSION TESTING | Age: 33
Discharge: HOME | End: 2018-12-07
Attending: OBSTETRICS & GYNECOLOGY
Payer: MEDICARE

## 2018-12-07 ENCOUNTER — HOSPITAL ENCOUNTER (OUTPATIENT)
Dept: GENERAL RADIOLOGY | Age: 33
Discharge: HOME | End: 2018-12-07
Attending: OBSTETRICS & GYNECOLOGY
Payer: MEDICARE

## 2018-12-07 VITALS
HEIGHT: 63 IN | BODY MASS INDEX: 51.91 KG/M2 | TEMPERATURE: 98.4 F | WEIGHT: 293 LBS | HEART RATE: 85 BPM | DIASTOLIC BLOOD PRESSURE: 97 MMHG | OXYGEN SATURATION: 98 % | SYSTOLIC BLOOD PRESSURE: 150 MMHG | RESPIRATION RATE: 16 BRPM

## 2018-12-07 DIAGNOSIS — R10.2 PELVIC PAIN IN FEMALE: ICD-10-CM

## 2018-12-07 DIAGNOSIS — N93.9 ABNORMAL UTERINE BLEEDING: ICD-10-CM

## 2018-12-07 LAB
ABO + RH BLD: NORMAL
ANION GAP SERPL CALC-SCNC: 6 MMOL/L (ref 5–15)
ATRIAL RATE: 84 BPM
BLOOD GROUP ANTIBODIES SERPL: NORMAL
BUN SERPL-MCNC: 7 MG/DL (ref 6–20)
BUN/CREAT SERPL: 9 (ref 12–20)
CALCIUM SERPL-MCNC: 8.9 MG/DL (ref 8.5–10.1)
CALCULATED P AXIS, ECG09: 31 DEGREES
CALCULATED R AXIS, ECG10: 3 DEGREES
CALCULATED T AXIS, ECG11: 7 DEGREES
CHLORIDE SERPL-SCNC: 104 MMOL/L (ref 97–108)
CO2 SERPL-SCNC: 27 MMOL/L (ref 21–32)
CREAT SERPL-MCNC: 0.81 MG/DL (ref 0.55–1.02)
DIAGNOSIS, 93000: NORMAL
ERYTHROCYTE [DISTWIDTH] IN BLOOD BY AUTOMATED COUNT: 16.4 % (ref 11.5–14.5)
GLUCOSE SERPL-MCNC: 105 MG/DL (ref 65–100)
HCT VFR BLD AUTO: 28.4 % (ref 35–47)
HGB BLD-MCNC: 9.1 G/DL (ref 11.5–16)
MCH RBC QN AUTO: 25.6 PG (ref 26–34)
MCHC RBC AUTO-ENTMCNC: 32 G/DL (ref 30–36.5)
MCV RBC AUTO: 79.8 FL (ref 80–99)
NRBC # BLD: 0 K/UL (ref 0–0.01)
NRBC BLD-RTO: 0 PER 100 WBC
P-R INTERVAL, ECG05: 154 MS
PLATELET # BLD AUTO: 384 K/UL (ref 150–400)
PMV BLD AUTO: 9 FL (ref 8.9–12.9)
POTASSIUM SERPL-SCNC: 3.6 MMOL/L (ref 3.5–5.1)
Q-T INTERVAL, ECG07: 370 MS
QRS DURATION, ECG06: 86 MS
QTC CALCULATION (BEZET), ECG08: 437 MS
RBC # BLD AUTO: 3.56 M/UL (ref 3.8–5.2)
SODIUM SERPL-SCNC: 137 MMOL/L (ref 136–145)
SPECIMEN EXP DATE BLD: NORMAL
VENTRICULAR RATE, ECG03: 84 BPM
WBC # BLD AUTO: 7.1 K/UL (ref 3.6–11)

## 2018-12-07 PROCEDURE — 93005 ELECTROCARDIOGRAM TRACING: CPT

## 2018-12-07 PROCEDURE — 36415 COLL VENOUS BLD VENIPUNCTURE: CPT

## 2018-12-07 PROCEDURE — 80048 BASIC METABOLIC PNL TOTAL CA: CPT

## 2018-12-07 PROCEDURE — 86901 BLOOD TYPING SEROLOGIC RH(D): CPT

## 2018-12-07 PROCEDURE — 85027 COMPLETE CBC AUTOMATED: CPT

## 2018-12-07 PROCEDURE — 71046 X-RAY EXAM CHEST 2 VIEWS: CPT

## 2018-12-07 RX ORDER — ALBUTEROL SULFATE 0.83 MG/ML
SOLUTION RESPIRATORY (INHALATION)
COMMUNITY
End: 2019-09-15

## 2018-12-07 RX ORDER — LISINOPRIL 30 MG/1
30 TABLET ORAL DAILY
COMMUNITY
End: 2019-03-12 | Stop reason: SDUPTHER

## 2018-12-07 NOTE — PERIOP NOTES
Faxed preop labs to Dr Shannan Ram noting HGB. Any treatment? Fax confirmed. During PAT visit, patient reports intermittent chest pain. Patient reports last episode of chest pain was about a month ago. She denies any current chest pain, SOB. Patient is in \"No Acute Distress\". EKG done 12/07/2018 and was shown to anesthesia by Camilla Heart RN and ok to proceed with surgery by Dr Misa Angel.

## 2018-12-07 NOTE — PERIOP NOTES
Dr. Meryle Bodo reviewed ekg 12/7/18,  5/14/18 along with cardiac note from 5/14/18. Will proceed with surgery no changes noted.

## 2018-12-07 NOTE — PERIOP NOTES
[facility], [place]  Preoperative Instructions        Surgery Date 12/10/2018          Time of Arrival 1230 pm Contact # [phone]    1. On the day of your surgery, please report to the Surgical Services Registration Desk and sign in at your designated time. The Surgery Center is located to the right of the Emergency Room. 2. You must have someone with you to drive you home. You should not drive a car for 24 hours following surgery. Please make arrangements for a friend or family member to stay with you for the first 24 hours after your surgery. 3. Do not have anything to eat or drink (including water, gum, mints, coffee, juice) after midnight ?? .? This may not apply to medications prescribed by your physician. ?(Please note below the special instructions with medications to take the morning of your procedure.)    4. We recommend you do not drink any alcoholic beverages for 24 hours before and after your surgery. 5. Contact your surgeons office for instructions on the following medications: non-steroidal anti-inflammatory drugs (i.e. Advil, Aleve), vitamins, and supplements. (Some surgeons will want you to stop these medications prior to surgery and others may allow you to take them)  **If you are currently taking Plavix, Coumadin, Aspirin and/or other blood-thinning agents, contact your surgeon for instructions. ** Your surgeon will partner with the physician prescribing these medications to determine if it is safe to stop or if you need to continue taking. Please do not stop taking these medications without instructions from your surgeon    6. Wear comfortable clothes. Wear glasses instead of contacts. Do not bring any money or jewelry. Please bring picture ID, insurance card, and any prearranged co-payment or hospital payment. Do not wear make-up, particularly mascara the morning of your surgery. Do not wear nail polish, particularly if you are having foot /hand surgery. Wear your hair loose or down, no ponytails, buns, jeff pins or clips. All body piercings must be removed. Please shower with antibacterial soap for three consecutive days before and on the morning of surgery, but do not apply any lotions, powders or deodorants after the shower on the day of surgery. Please use a fresh towels after each shower. Please sleep in clean clothes and change bed linens the night before surgery. Please do not shave for 48 hours prior to surgery. Shaving of the face is acceptable. 7. You should understand that if you do not follow these instructions your surgery may be cancelled. If your physical condition changes (I.e. fever, cold or flu) please contact your surgeon as soon as possible. 8. It is important that you be on time. If a situation occurs where you may be late, please call [phone] (OR Holding Area). 9. If you have any questions and or problems, please call [phone] (Pre-admission Testing). 10. Your surgery time may be subject to change. You will receive a phone call the evening prior if your time changes. 11.  If having outpatient surgery, you must have someone to drive you here, stay with you during the duration of your stay, and to drive you home at time of discharge. 12.   In an effort to improve the efficiency, privacy, and safety for all of our Pre-op patients visitors are not allowed in the Holding area. Once you arrive and are registered your family/visitors will be asked to remain in the waiting room. The Pre-op staff will get you from the Surgical Waiting Area and will explain to you and your family/visitors that the Pre-op phase is beginning. The staff will answer any questions and provide instructions for tracking of the patient, by use of the existing tracking number and color-coded status board in the waiting room.   At this time the staff will also ask for your designated spokesperson information in the event that the physician or staff need to provide an update or obtain any pertinent information. The designated spokesperson will be notified if the physician needs to speak to family during the pre-operative phase. If at any time your family/visitors has questions or concerns they may approach the volunteer desk in the waiting area for assistance. Special Instructions:    MEDICATIONS TO TAKE THE MORNING OF SURGERY WITH A SIP OF WATER:acyclovir if needed, albuterol inhaler or nebulizer if needed, advair, hydrochlorothiazide, hydrocodone or percocet if needed, claritin, megace, zofran if needed,       I understand a pre-operative phone call will be made to verify my surgery time. In the event that I am not available, I give permission for a message to be left on my answering service and/or with another person?   yes         ___________________      __________   _________    (Signature of Patient)             (Witness)                (Date and Time)

## 2018-12-10 ENCOUNTER — HOSPITAL ENCOUNTER (OUTPATIENT)
Age: 33
Setting detail: OUTPATIENT SURGERY
Discharge: HOME | End: 2018-12-10
Attending: OBSTETRICS & GYNECOLOGY | Admitting: OBSTETRICS & GYNECOLOGY
Payer: MEDICARE

## 2018-12-10 ENCOUNTER — ANESTHESIA (OUTPATIENT)
Dept: SURGERY | Age: 33
End: 2018-12-10
Payer: MEDICARE

## 2018-12-10 ENCOUNTER — ANESTHESIA EVENT (OUTPATIENT)
Dept: SURGERY | Age: 33
End: 2018-12-10
Payer: MEDICARE

## 2018-12-10 VITALS
TEMPERATURE: 100.1 F | WEIGHT: 293 LBS | BODY MASS INDEX: 51.91 KG/M2 | HEIGHT: 63 IN | OXYGEN SATURATION: 97 % | SYSTOLIC BLOOD PRESSURE: 135 MMHG | RESPIRATION RATE: 17 BRPM | DIASTOLIC BLOOD PRESSURE: 66 MMHG | HEART RATE: 101 BPM

## 2018-12-10 LAB
GLUCOSE BLD STRIP.AUTO-MCNC: 100 MG/DL (ref 65–100)
HCG UR QL: NEGATIVE
SERVICE CMNT-IMP: NORMAL

## 2018-12-10 PROCEDURE — 88305 TISSUE EXAM BY PATHOLOGIST: CPT

## 2018-12-10 PROCEDURE — 82962 GLUCOSE BLOOD TEST: CPT

## 2018-12-10 PROCEDURE — 77030013140 HC MSK NEB VYRM -A

## 2018-12-10 PROCEDURE — 74011000250 HC RX REV CODE- 250

## 2018-12-10 PROCEDURE — 74011250636 HC RX REV CODE- 250/636

## 2018-12-10 PROCEDURE — 81025 URINE PREGNANCY TEST: CPT

## 2018-12-10 PROCEDURE — 76010000138 HC OR TIME 0.5 TO 1 HR: Performed by: OBSTETRICS & GYNECOLOGY

## 2018-12-10 PROCEDURE — 76210000017 HC OR PH I REC 1.5 TO 2 HR: Performed by: OBSTETRICS & GYNECOLOGY

## 2018-12-10 PROCEDURE — 74011250636 HC RX REV CODE- 250/636: Performed by: ANESTHESIOLOGY

## 2018-12-10 PROCEDURE — 76060000032 HC ANESTHESIA 0.5 TO 1 HR: Performed by: OBSTETRICS & GYNECOLOGY

## 2018-12-10 PROCEDURE — 77030018836 HC SOL IRR NACL ICUM -A: Performed by: OBSTETRICS & GYNECOLOGY

## 2018-12-10 PROCEDURE — 77030033136 HC TBNG INFLO AQUILEX ST HOLO -C: Performed by: OBSTETRICS & GYNECOLOGY

## 2018-12-10 PROCEDURE — 77030032490 HC SLV COMPR SCD KNE COVD -B: Performed by: OBSTETRICS & GYNECOLOGY

## 2018-12-10 PROCEDURE — 77030011992 HC AIRWY NASOPHGL TELE -A: Performed by: NURSE ANESTHETIST, CERTIFIED REGISTERED

## 2018-12-10 PROCEDURE — 74011250637 HC RX REV CODE- 250/637: Performed by: ANESTHESIOLOGY

## 2018-12-10 PROCEDURE — 77030033137 HC TBNG OUTFLO AQUILEX ST HOLO -B: Performed by: OBSTETRICS & GYNECOLOGY

## 2018-12-10 PROCEDURE — 77030033135 HC DEV TISS RMVL HYSTSCP MYOSUR HOLO -G1: Performed by: OBSTETRICS & GYNECOLOGY

## 2018-12-10 PROCEDURE — 76210000024 HC REC RM PH II 2.5 TO 3 HR: Performed by: OBSTETRICS & GYNECOLOGY

## 2018-12-10 PROCEDURE — 77030005537 HC CATH URETH BARD -A: Performed by: OBSTETRICS & GYNECOLOGY

## 2018-12-10 RX ORDER — FENTANYL CITRATE 50 UG/ML
INJECTION, SOLUTION INTRAMUSCULAR; INTRAVENOUS AS NEEDED
Status: DISCONTINUED | OUTPATIENT
Start: 2018-12-10 | End: 2018-12-10 | Stop reason: HOSPADM

## 2018-12-10 RX ORDER — LABETALOL HYDROCHLORIDE 5 MG/ML
INJECTION, SOLUTION INTRAVENOUS
Status: COMPLETED
Start: 2018-12-10 | End: 2018-12-10

## 2018-12-10 RX ORDER — PROPOFOL 10 MG/ML
INJECTION, EMULSION INTRAVENOUS AS NEEDED
Status: DISCONTINUED | OUTPATIENT
Start: 2018-12-10 | End: 2018-12-10 | Stop reason: HOSPADM

## 2018-12-10 RX ORDER — IPRATROPIUM BROMIDE AND ALBUTEROL SULFATE 2.5; .5 MG/3ML; MG/3ML
SOLUTION RESPIRATORY (INHALATION)
Status: COMPLETED
Start: 2018-12-10 | End: 2018-12-10

## 2018-12-10 RX ORDER — SODIUM CHLORIDE 0.9 % (FLUSH) 0.9 %
5-10 SYRINGE (ML) INJECTION AS NEEDED
Status: DISCONTINUED | OUTPATIENT
Start: 2018-12-10 | End: 2018-12-10 | Stop reason: HOSPADM

## 2018-12-10 RX ORDER — HYDRALAZINE HYDROCHLORIDE 20 MG/ML
10 INJECTION INTRAMUSCULAR; INTRAVENOUS ONCE
Status: COMPLETED | OUTPATIENT
Start: 2018-12-10 | End: 2018-12-10

## 2018-12-10 RX ORDER — SODIUM CHLORIDE, SODIUM LACTATE, POTASSIUM CHLORIDE, CALCIUM CHLORIDE 600; 310; 30; 20 MG/100ML; MG/100ML; MG/100ML; MG/100ML
25 INJECTION, SOLUTION INTRAVENOUS CONTINUOUS
Status: DISCONTINUED | OUTPATIENT
Start: 2018-12-10 | End: 2018-12-10 | Stop reason: HOSPADM

## 2018-12-10 RX ORDER — DIPHENHYDRAMINE HYDROCHLORIDE 50 MG/ML
12.5 INJECTION, SOLUTION INTRAMUSCULAR; INTRAVENOUS AS NEEDED
Status: DISCONTINUED | OUTPATIENT
Start: 2018-12-10 | End: 2018-12-10 | Stop reason: HOSPADM

## 2018-12-10 RX ORDER — SODIUM CHLORIDE 0.9 % (FLUSH) 0.9 %
5-10 SYRINGE (ML) INJECTION EVERY 8 HOURS
Status: DISCONTINUED | OUTPATIENT
Start: 2018-12-10 | End: 2018-12-10 | Stop reason: HOSPADM

## 2018-12-10 RX ORDER — HYDRALAZINE HYDROCHLORIDE 20 MG/ML
INJECTION INTRAMUSCULAR; INTRAVENOUS
Status: COMPLETED
Start: 2018-12-10 | End: 2018-12-10

## 2018-12-10 RX ORDER — IPRATROPIUM BROMIDE AND ALBUTEROL SULFATE 2.5; .5 MG/3ML; MG/3ML
3 SOLUTION RESPIRATORY (INHALATION)
Status: COMPLETED | OUTPATIENT
Start: 2018-12-10 | End: 2018-12-10

## 2018-12-10 RX ORDER — MIDAZOLAM HYDROCHLORIDE 1 MG/ML
INJECTION, SOLUTION INTRAMUSCULAR; INTRAVENOUS AS NEEDED
Status: DISCONTINUED | OUTPATIENT
Start: 2018-12-10 | End: 2018-12-10 | Stop reason: HOSPADM

## 2018-12-10 RX ORDER — FENTANYL CITRATE 50 UG/ML
25 INJECTION, SOLUTION INTRAMUSCULAR; INTRAVENOUS
Status: DISCONTINUED | OUTPATIENT
Start: 2018-12-10 | End: 2018-12-10 | Stop reason: HOSPADM

## 2018-12-10 RX ORDER — ACETAMINOPHEN 500 MG
1000 TABLET ORAL
Status: COMPLETED | OUTPATIENT
Start: 2018-12-10 | End: 2018-12-10

## 2018-12-10 RX ORDER — LABETALOL HYDROCHLORIDE 5 MG/ML
20 INJECTION, SOLUTION INTRAVENOUS ONCE
Status: COMPLETED | OUTPATIENT
Start: 2018-12-10 | End: 2018-12-10

## 2018-12-10 RX ORDER — HYDRALAZINE HYDROCHLORIDE 20 MG/ML
10 INJECTION INTRAMUSCULAR; INTRAVENOUS
Status: COMPLETED | OUTPATIENT
Start: 2018-12-10 | End: 2018-12-10

## 2018-12-10 RX ORDER — PROPOFOL 10 MG/ML
INJECTION, EMULSION INTRAVENOUS
Status: DISCONTINUED | OUTPATIENT
Start: 2018-12-10 | End: 2018-12-10 | Stop reason: HOSPADM

## 2018-12-10 RX ORDER — ONDANSETRON 2 MG/ML
INJECTION INTRAMUSCULAR; INTRAVENOUS
Status: COMPLETED
Start: 2018-12-10 | End: 2018-12-10

## 2018-12-10 RX ORDER — ONDANSETRON 2 MG/ML
INJECTION INTRAMUSCULAR; INTRAVENOUS AS NEEDED
Status: DISCONTINUED | OUTPATIENT
Start: 2018-12-10 | End: 2018-12-10 | Stop reason: HOSPADM

## 2018-12-10 RX ORDER — LIDOCAINE HYDROCHLORIDE 10 MG/ML
0.1 INJECTION, SOLUTION EPIDURAL; INFILTRATION; INTRACAUDAL; PERINEURAL AS NEEDED
Status: DISCONTINUED | OUTPATIENT
Start: 2018-12-10 | End: 2018-12-10 | Stop reason: HOSPADM

## 2018-12-10 RX ADMIN — ONDANSETRON 4 MG: 2 INJECTION INTRAMUSCULAR; INTRAVENOUS at 13:54

## 2018-12-10 RX ADMIN — HYDRALAZINE HYDROCHLORIDE 10 MG: 20 INJECTION INTRAMUSCULAR; INTRAVENOUS at 13:43

## 2018-12-10 RX ADMIN — FENTANYL CITRATE 25 MCG: 50 INJECTION, SOLUTION INTRAMUSCULAR; INTRAVENOUS at 14:24

## 2018-12-10 RX ADMIN — MIDAZOLAM HYDROCHLORIDE 2 MG: 1 INJECTION, SOLUTION INTRAMUSCULAR; INTRAVENOUS at 13:54

## 2018-12-10 RX ADMIN — PROPOFOL 75 MCG/KG/MIN: 10 INJECTION, EMULSION INTRAVENOUS at 14:00

## 2018-12-10 RX ADMIN — LABETALOL HYDROCHLORIDE 20 MG: 5 INJECTION, SOLUTION INTRAVENOUS at 15:48

## 2018-12-10 RX ADMIN — FENTANYL CITRATE 25 MCG: 50 INJECTION, SOLUTION INTRAMUSCULAR; INTRAVENOUS at 13:54

## 2018-12-10 RX ADMIN — FENTANYL CITRATE 25 MCG: 50 INJECTION, SOLUTION INTRAMUSCULAR; INTRAVENOUS at 14:20

## 2018-12-10 RX ADMIN — HYDRALAZINE HYDROCHLORIDE 10 MG: 20 INJECTION INTRAMUSCULAR; INTRAVENOUS at 15:29

## 2018-12-10 RX ADMIN — PROPOFOL 30 MG: 10 INJECTION, EMULSION INTRAVENOUS at 14:19

## 2018-12-10 RX ADMIN — FENTANYL CITRATE 25 MCG: 50 INJECTION, SOLUTION INTRAMUSCULAR; INTRAVENOUS at 14:34

## 2018-12-10 RX ADMIN — PROPOFOL 30 MG: 10 INJECTION, EMULSION INTRAVENOUS at 14:13

## 2018-12-10 RX ADMIN — IPRATROPIUM BROMIDE AND ALBUTEROL SULFATE 3 ML: .5; 3 SOLUTION RESPIRATORY (INHALATION) at 15:10

## 2018-12-10 RX ADMIN — ACETAMINOPHEN 1000 MG: 500 TABLET ORAL at 17:37

## 2018-12-10 RX ADMIN — ONDANSETRON 4 MG: 2 INJECTION INTRAMUSCULAR; INTRAVENOUS at 17:15

## 2018-12-10 RX ADMIN — IPRATROPIUM BROMIDE AND ALBUTEROL SULFATE 3 ML: 2.5; .5 SOLUTION RESPIRATORY (INHALATION) at 15:10

## 2018-12-10 RX ADMIN — SODIUM CHLORIDE, SODIUM LACTATE, POTASSIUM CHLORIDE, AND CALCIUM CHLORIDE: 600; 310; 30; 20 INJECTION, SOLUTION INTRAVENOUS at 13:33

## 2018-12-10 RX ADMIN — SODIUM CHLORIDE, SODIUM LACTATE, POTASSIUM CHLORIDE, AND CALCIUM CHLORIDE 25 ML/HR: 600; 310; 30; 20 INJECTION, SOLUTION INTRAVENOUS at 13:32

## 2018-12-10 RX ADMIN — PROPOFOL 40 MG: 10 INJECTION, EMULSION INTRAVENOUS at 14:01

## 2018-12-10 NOTE — PERIOP NOTES
Pt w/ faint expiratory wheeze, diminished breath sounds bilateral bases. sats 99% on 3L NC. Order rec'd from Dr Radha Cody for [facility]. Med admin.

## 2018-12-10 NOTE — DISCHARGE INSTRUCTIONS
POSTOPERATIVE INSTRUCTIONS  FOR D&C, HYSTEROSCOPY       A D&C is a minor procedure. Your recovery from each one of these procedures should be relatively quick and uneventful. There are a few points that we ask you to remember:       1. Absolutely no intercourse, douching or tampon use for two weeks. 2. You can expect to have some vaginal bleeding or bloody vaginal discharge for the    next 2 to 4 weeks. 3. You may take tub baths after one week and showers at any time. 4. You may resume normal everyday activities as you feel able and return to work    within 1-5 days after surgery. 5. Notify us if you experience:     a.  heavy vaginal bleeding or foul vaginal discharge    b.  temperature of 101° or greater    c.  severe pelvic or vaginal pain      6. Please call the office today at [phone] to schedule your checkup appointment    in 2 weeks. Above all, please notify us of a problem if it arises before we see you again. In an emergency, you may contact a doctor 24 hours a day at [phone]. DISCHARGE SUMMARY from Nurse    PATIENT INSTRUCTIONS:    After general anesthesia or intravenous sedation, for 24 hours or while taking prescription Narcotics:  · Limit your activities  · Do not drive and operate hazardous machinery  · Do not make important personal or business decisions  · Do  not drink alcoholic beverages  · If you have not urinated within 8 hours after discharge, please contact your surgeon on call.     Report the following to your surgeon:  · Excessive pain, swelling, redness or odor of or around the surgical area  · Temperature over 100.5  · Nausea and vomiting lasting longer than 4 hours or if unable to take medications  · Any signs of decreased circulation or nerve impairment to extremity: change in color, persistent  numbness, tingling, coldness or increase pain  · Any questions    What to do at Home:    *  Please give a list of your current medications to your Primary Care Provider. *  Please update this list whenever your medications are discontinued, doses are      changed, or new medications (including over-the-counter products) are added. *  Please carry medication information at all times in case of emergency situations. These are general instructions for a healthy lifestyle:    No smoking/ No tobacco products/ Avoid exposure to second hand smoke  Surgeon General's Warning:  Quitting smoking now greatly reduces serious risk to your health. Obesity, smoking, and sedentary lifestyle greatly increases your risk for illness    A healthy diet, regular physical exercise & weight monitoring are important for maintaining a healthy lifestyle    You may be retaining fluid if you have a history of heart failure or if you experience any of the following symptoms:  Weight gain of 3 pounds or more overnight or 5 pounds in a week, increased swelling in our hands or feet or shortness of breath while lying flat in bed. Please call your doctor as soon as you notice any of these symptoms; do not wait until your next office visit. Recognize signs and symptoms of STROKE:    F-face looks uneven    A-arms unable to move or move unevenly    S-speech slurred or non-existent    T-time-call 911 as soon as signs and symptoms begin-DO NOT go       Back to bed or wait to see if you get better-TIME IS BRAIN. Warning Signs of HEART ATTACK     Call 911 if you have these symptoms:   Chest discomfort. Most heart attacks involve discomfort in the center of the chest that lasts more than a few minutes, or that goes away and comes back. It can feel like uncomfortable pressure, squeezing, fullness, or pain.  Discomfort in other areas of the upper body. Symptoms can include pain or discomfort in one or both arms, the back, neck, jaw, or stomach.  Shortness of breath with or without chest discomfort.    Other signs may include breaking out in a cold sweat, nausea, or lightheadedness. Don't wait more than five minutes to call 911 - MINUTES MATTER! Fast action can save your life. Calling 911 is almost always the fastest way to get lifesaving treatment. Emergency Medical Services staff can begin treatment when they arrive -- up to an hour sooner than if someone gets to the hospital by car. The discharge information has been reviewed with the patient and caregiver. The patient and caregiver verbalized understanding. Discharge medications reviewed with the patient and caregiver and appropriate educational materials and side effects teaching were provided. ___________________________________________________________________________________________________________________________________     A common side effect of anesthesia following surgery is nausea and/or vomiting. In order to decrease symptoms, it is wise to avoid foods that are high in fat, greasy foods, milk products, and spicy foods for the first 24 hours. Acceptable foods for the first 24 hours following surgery include but are not limited to:     soup   broth    toast    crackers    applesauce    bananas    mashed potatoes,   soft or scrambled eggs   oatmeal    jello    It is important to eat when taking your pain medication. This will help to prevent nausea. If possible, please try to time your meals with your medications. It is very important to stay hydrated following surgery. Sip fluids frequently while awake. Avoid acidic drinks such as citrus juices and soda for 24 hours. Carbonated beverages may cause bloating and gas. Acceptable fluids include:    - water (flavor packets may add variety)  - coffee or tea (in moderation)  - Gatorade  - Harshad-aid  - apple juice  - cranberry juice    You are encouraged to cough and deep breathe every hour when awake. This will help to prevent respiratory complications following anesthesia.  You may want to hug a pillow when coughing and sneezing to add additional support to the surgical area and to decrease discomfort if you had abdominal or chest surgery. If you are discharged home with support stockings, you may remove them after 24 hours. Support stockings are used to help prevent blood clots in the legs following surgery. Please take time to review all of your Home Care Instructions and Medication Information sheets provided in your discharge packet. If you have any questions, please contact your surgeons office.  Thank you.         [email or web address]

## 2018-12-10 NOTE — PERIOP NOTES
[facility]. Representative Sherley Velez stated that due to the weather, they are having difficulty locating a  and they will call as soon as a  is available. Phase II and main PACU phone numbers provided as the patient's IV will not be removed until her ride arrives. The patient's friend is at the bedside; patient denies pain at this time.

## 2018-12-10 NOTE — PERIOP NOTES
1045:  Phoned patient to see if she could come in earlier for surgery. She needs to contact  and will call us back if she can    1100:  Patient's caregiver phoned and reported they had a ride in but would need medical transport to home. The patient does have care at home after surgery. 1200:  Patient phoned to say she was on the way.

## 2018-12-10 NOTE — ANESTHESIA POSTPROCEDURE EVALUATION
Procedure(s): HYSTEROSCOPY, DILATATION AND CURETTAGE  WITH MYOSURE. Anesthesia Post Evaluation        Patient location during evaluation: PACU  Note status: Adequate. Level of consciousness: responsive to verbal stimuli and sleepy but conscious  Pain management: satisfactory to patient  Airway patency: patent  Anesthetic complications: no  Cardiovascular status: acceptable  Respiratory status: acceptable  Hydration status: acceptable  Comments: +Post-Anesthesia Evaluation and Assessment    Patient: Jareth Lizarraga MRN: [id number]  SSN: [id number]   YOB: 1985  Age: 35 y.o. Sex: female      Cardiovascular Function/Vital Signs    /74   Pulse (!) 110   Temp (!) 38.1 °C (100.5 °F)   Resp 24   Ht 5' 3\" (1.6 m)   Wt 145 kg (319 lb 10.7 oz)   SpO2 97%   BMI 56.63 kg/m²     Patient is status post Procedure(s): HYSTEROSCOPY, DILATATION AND CURETTAGE  WITH MYOSURE. Nausea/Vomiting: Controlled. Postoperative hydration reviewed and adequate. Pain:  Pain Scale 1: Numeric (0 - 10) (12/10/18 1600)  Pain Intensity 1: 0 (12/10/18 1600)   Managed. Neurological Status:   Neuro (WDL): Exceptions to WDL (12/10/18 1454)   At baseline. Mental Status and Level of Consciousness: Arousable. Pulmonary Status:   O2 Device: Room air (12/10/18 1543)   Adequate oxygenation and airway patent. Complications related to anesthesia: None    Post-anesthesia assessment completed. No concerns.     Signed By: Ciera Howell MD    12/10/2018  Post anesthesia nausea and vomiting:  controlled      Visit Vitals  /74   Pulse (!) 110   Temp (!) 38.1 °C (100.5 °F)   Resp 24   Ht 5' 3\" (1.6 m)   Wt 145 kg (319 lb 10.7 oz)   SpO2 97%   BMI 56.63 kg/m²

## 2018-12-10 NOTE — PERIOP NOTES
Bedside and Verbal shift change report received from Kylah Neil RN(offgoing nurse). Report included the following information SBAR, Kardex, Procedure Summary, Intake/Output, MAR, Accordion and Recent Results. Discharge teaching conducted by offgoing nurse. Signature acknowledging discharge teaching obtained by offgoing nurse as well; however, this nurse will reiterate pertinent information before the patient leaves the facility. The patient has been dressed and is waiting for [employer] to arrive to transport her home.

## 2018-12-10 NOTE — PERIOP NOTES
80 Dr. Bryon Pearson notified of patient's blood pressure readings. New order received for Hydralazine 10 mg IV now. Orders entered into [facility].

## 2018-12-10 NOTE — PERIOP NOTES
Handoff Report from Operating Room to PACU    Report received from Elin Nevarez RN and CHERYL Calvert CRNA regarding [facility] [place]. Surgeon(s):  Naz Poe MD  And Procedure(s) (LRB):  HYSTEROSCOPY, DILATATION AND CURETTAGE  WITH MYOSURE (N/A)  confirmed   with dressings discussed. Anesthesia type, drugs, patient history, complications, estimated blood loss, vital signs, intake and output, and last pain medication, lines and temperature were reviewed.
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
## 2018-12-10 NOTE — ANESTHESIA PREPROCEDURE EVALUATION
Anesthetic History   No history of anesthetic complications            Review of Systems / Medical History  Patient summary reviewed, nursing notes reviewed and pertinent labs reviewed    Pulmonary    COPD: mild        Asthma : well controlled    Comments: Smoker - 0.25 ppd   Neuro/Psych     seizures: well controlled  CVA  TIA and psychiatric history    Comments: CVA with right-sided weakness  Anxiety  Bipolar I Disorder  Borderline Schizophrenia Cardiovascular    Hypertension          Hyperlipidemia    Exercise tolerance: <4 METS  Comments: TTE (4/17/18): Mild concentric LVH, EF=60%   GI/Hepatic/Renal     GERD      Liver disease     Endo/Other    Diabetes: well controlled, type 2, using insulin    Morbid obesity and arthritis     Other Findings   Comments: Abnormal Uterine Bleeding  Pelvic Pain    Chronic Low Back Pain    Marijuana Use         Physical Exam    Airway  Mallampati: III  TM Distance: 4 - 6 cm  Neck ROM: normal range of motion   Mouth opening: Normal     Cardiovascular  Regular rate and rhythm,  S1 and S2 normal,  no murmur, click, rub, or gallop             Dental    Dentition: Poor dentition  Comments: Upper edentulous;   Lower - multiple missing, remaining teeth with significant decay   Pulmonary  Breath sounds clear to auscultation               Abdominal  GI exam deferred       Other Findings            Anesthetic Plan    ASA: 3  Anesthesia type: total IV anesthesia, MAC and general - backup          Induction: Intravenous  Anesthetic plan and risks discussed with: Patient

## 2018-12-10 NOTE — PERIOP NOTES
Patient began coughing up clear mucous. The patient and her friend stated that the patient had this cough prior to arrival to the hospital and that she has been seen by a physician for the cough \"about four days ago. \" Patient complained of nausea; 4 milligrams IV Zofran administered per orders.

## 2018-12-10 NOTE — OP NOTES
Preoperative diagnosis: AUB  Postoperative diagnosis: same    Procedure:  Hysteroscopy, D&C with Myosure    Anesthesia: MAC    Assistant surgeon: none    Implants: none    Complications: none    Estimated blood loss: 25 ml  IV fluid: 500 ml  Urine output: 50 ml    Specimen: endometrial curettings     Findings: On exam under anesthesia, large irregular uterus. On hysteroscopy, thickened lining. Large endometrial cavity. no definitive polyp noted. Procedure:    Patient was taken to the operating room and placed on the operating room table. She was then placed under anesthesia via MAC. She was placed in lithotomy position with her legs in Yellofin stirrups. An exam under anesthesia was performed with the above findings. She was then prepped and draped in a sterile fashion. A time out was performed. Patient was straight catheterized for urine. A sterile speculum was then placed in her vagina and the cervix was visualized. It was then grasped on the anterior lip with a single tooth tenaculum. Uterus was then sounded to 11 cm. Cervical os was then serially dilated using Maki dilators to [address]. A hysteroscope was then inserted into the endometrial cavity with the above findings. Myosure device was then inserted through the hysteroscope, and D&C was performed. Hysteroscope and Myosure were then removed from the uterus. The uterus was then sharply curetted until a gritty texture was noted throughout. Tenaculum was then removed from the anterior lip of the cervix. Hemostasis was noted to be excellent. Speculum was then removed from the vagina . Patient was taken out of lithotomy position, taken out of anesthesia, and taken to the recovery room in stable condition. She tolerated the procedure well, and there were no complications.

## 2018-12-10 NOTE — DISCHARGE SUMMARY
Discharge Summary     Name: Kaylyn Lucas MRN: [id number]  SSN: [id number]    YOB: 1985  Age: 35 y.o. Sex: female      Allergies: Aspirin; Vicodin [hydrocodone-acetaminophen]; Flexeril [cyclobenzaprine]; Ibuprofen; Ivp [fd and c blue no.1]; Pcn [penicillins]; Toradol [ketorolac tromethamine]; and Ultram [tramadol]    Admit Date: 12/10/2018    Discharge Date: 12/10/2018      Admitting Physician: Romaine Brody MD     * Admission Diagnoses: Abnormal uterine bleeding    * Discharge Diagnoses:   Hospital Problems as of 12/10/2018 Date Reviewed: 12/10/2018    None           * Procedures: Hysteroscopy, D&C with Myosure     * Discharge Condition: [facility] Course: Normal hospital course for this procedure. Significant Diagnostic Studies:   Recent Results (from the past 24 hour(s))   HCG URINE, QL. - POC    Collection Time: 12/10/18  1:18 PM   Result Value Ref Range    Pregnancy test,urine (POC) NEGATIVE  NEG         * Disposition: Home    Discharge Medications:   Current Discharge Medication List      CONTINUE these medications which have NOT CHANGED    Details   lisinopril (PRINIVIL, ZESTRIL) 30 mg tablet Take 30 mg by mouth daily. paliperidone palmitate (INVEGA SUSTENNA) 156 mg/mL injection 156 mg by IntraMUSCular route every thirty (30) days. albuterol (PROVENTIL VENTOLIN) 2.5 mg /3 mL (0.083 %) nebulizer solution by Nebulization route every four (4) hours as needed for Wheezing. megestrol (MEGACE) 40 mg tablet Take 1 Tab by mouth daily. Qty: 30 Tab, Refills: 5      Ferrous Sulfate (SLOW FE) 47.5 mg iron TbER tablet Take 1 Tab by mouth daily. Qty: 30 Tab, Refills: 12      oxyCODONE-acetaminophen (PERCOCET) 5-325 mg per tablet Take 1 Tab by mouth every six (6) hours as needed for Pain. Max Daily Amount: 4 Tabs.   Qty: 12 Tab, Refills: 0    Associated Diagnoses: Pelvic pain      ondansetron (ZOFRAN ODT) 4 mg disintegrating tablet Take 1 Tab by mouth every six (6) hours as needed for Nausea. Qty: 20 Tab, Refills: 4      HYDROcodone-acetaminophen (NORCO) 7.5-325 mg per tablet Take 1 Tab by mouth every eight (8) hours as needed for Pain.      hydroCHLOROthiazide (MICROZIDE) 12.5 mg capsule Take 25 mg by mouth daily. ketoconazole (NIZORAL) 2 % topical cream Apply  to affected area daily. loratadine 10 mg cap Take 10 mg by mouth daily. insulin detemir (LEVEMIR) 100 unit/mL injection 23 Units by SubCUTAneous route nightly. fluticasone-salmeterol (ADVAIR) 100-50 mcg/dose diskus inhaler Take 1 puff by inhalation every twelve (12) hours. albuterol (PROVENTIL HFA, VENTOLIN HFA, PROAIR HFA) 90 mcg/actuation inhaler Take 1-2 puffs by inhalation every four (4) hours as needed for Wheezing. insulin aspart (NOVOLOG) 100 unit/mL flexpen Use as directed up to 4 times daily per sliding scale:  150-200: 2 units, 201-250: 4 units, 251-300: 6 units, 301-350: 8 units, 351-400: 10 units  Qty: 15 mL, Refills: 11      acyclovir (ZOVIRAX) 400 mg tablet Take 800 mg by mouth three (3) times daily as needed. * Follow-up Care/Patient Instructions: Activity: No sex, douching, or tampons for 2 weeks or as directed by your physician.    Diet: Resume pre-hospital diet  Wound Care: None needed    Follow-up Information     Follow up With Specialties Details Why Katina Roberts MD Obstetrics & Gynecology Call in 2 weeks  [address]   [phone]  [phone]             Signed By:  Alexandria Damon MD     December 10, 2018

## 2018-12-10 NOTE — PERIOP NOTES
Pt for dc home. Vswnl. Denies pain, nausea. Per pt, needs transportation home. Call to [address] Spoke w/\"Marcia\". Will send transport. Reference [id number]. Went over [employer] w/pt and friend, Marco Perez, including f/up. Verbalized understanding. Verbal handoff given to [address], RN.

## 2018-12-10 NOTE — BRIEF OP NOTE
BRIEF OPERATIVE NOTE    Date of Procedure: 12/10/2018   Preoperative Diagnosis: ABNORMAL UTERINE BLEEDING   PELVIC PAIN  Postoperative Diagnosis: ABNORMAL UTERINE BLEEDING     Procedure(s): HYSTEROSCOPY, DILATATION AND CURETTAGE  WITH MYOSURE  Surgeon(s) and Role:     * Celestino Lombard, MD - Primary         Surgical Assistant: none    Surgical Staff:  Circ-1: Toyin Robledo  Scrub Tech-1: Chava KEATING  Event Time In Time Out   Incision Start 1416    Incision Close 1435      Anesthesia: MAC   Estimated Blood Loss: 25 ml  Specimens:   ID Type Source Tests Collected by Time Destination   1 : Endometrial Curettings Preservative Endometrial Siva Aj MD 12/10/2018 1428 Pathology      Findings: On exam under anesthesia, large irregular uterus. On hysteroscopy, thickened lining. Large endometrial cavity. no definitive polyp noted.    Complications: none  Implants: * No implants in log *

## 2018-12-10 NOTE — PERIOP NOTES
bp 180's-190;s/80's-90's. Order rec'd from Dr Ximena Aguayo for 10mg hydralazine. Med given. 1545  bp remain 190's-200's. 's. Order rec'd from Dr Surendra Holliday for 20mg labetolol. Med given.

## 2018-12-11 NOTE — PERIOP NOTES
Patient's transport arrived at facility to take her home. The patient and her friend were asked if they had any questions or concerns regarding discharge instructions prior to leaving the hospital. The patient's friend asked if she could give her Tylenol PM for pain. This nurse instructed the patient's friend that while regular Tylenol was acceptable, Tylenol PM is NOT acceptable at this time. The patient's friend verbalized understanding.  No other questions or concerns were expressed at the time of departure from the hospital.

## 2018-12-21 ENCOUNTER — TELEPHONE (OUTPATIENT)
Dept: OBGYN CLINIC | Age: 33
End: 2018-12-21

## 2018-12-27 ENCOUNTER — TELEPHONE (OUTPATIENT)
Dept: OBGYN CLINIC | Age: 33
End: 2018-12-27

## 2018-12-27 RX ORDER — NAPROXEN 500 MG/1
500 TABLET ORAL 2 TIMES DAILY WITH MEALS
Qty: 60 TAB | Refills: 1 | OUTPATIENT
Start: 2018-12-27 | End: 2019-10-13

## 2018-12-27 NOTE — TELEPHONE ENCOUNTER
Last office visit 12/5/18. Pt had surgery 12/10/18. Please note pts' allergies. Med pended, waiting for Dr. Tim Young.

## 2019-01-03 ENCOUNTER — TELEPHONE (OUTPATIENT)
Dept: OBGYN CLINIC | Age: 34
End: 2019-01-03

## 2019-01-03 NOTE — TELEPHONE ENCOUNTER
Pt states that she had an D&C. But she would like someone to call her because she has lots of questions that she needs to ask.
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
## 2019-01-17 ENCOUNTER — OFFICE VISIT (OUTPATIENT)
Dept: OBGYN CLINIC | Age: 34
End: 2019-01-17

## 2019-01-17 VITALS
SYSTOLIC BLOOD PRESSURE: 153 MMHG | DIASTOLIC BLOOD PRESSURE: 97 MMHG | BODY MASS INDEX: 51.91 KG/M2 | TEMPERATURE: 97.3 F | WEIGHT: 293 LBS | OXYGEN SATURATION: 98 % | RESPIRATION RATE: 20 BRPM | HEART RATE: 86 BPM | HEIGHT: 63 IN

## 2019-01-17 DIAGNOSIS — K59.00 CONSTIPATION, UNSPECIFIED CONSTIPATION TYPE: ICD-10-CM

## 2019-01-17 DIAGNOSIS — N64.52 NIPPLE DISCHARGE: ICD-10-CM

## 2019-01-17 DIAGNOSIS — Z09 POSTOPERATIVE EXAMINATION: Primary | ICD-10-CM

## 2019-01-17 DIAGNOSIS — E22.1 HYPERPROLACTINEMIA (HCC): ICD-10-CM

## 2019-01-17 RX ORDER — NAPROXEN 500 MG/1
500 TABLET ORAL 2 TIMES DAILY WITH MEALS
Qty: 60 TAB | Refills: 1 | Status: SHIPPED | OUTPATIENT
Start: 2019-01-17 | End: 2019-08-28 | Stop reason: SDUPTHER

## 2019-01-17 RX ORDER — DOCUSATE SODIUM 100 MG/1
100 CAPSULE, LIQUID FILLED ORAL 2 TIMES DAILY
Qty: 60 CAP | Refills: 12 | Status: SHIPPED | OUTPATIENT
Start: 2019-01-17 | End: 2020-07-01

## 2019-01-17 RX ORDER — BISACODYL 5 MG
10 TABLET, DELAYED RELEASE (ENTERIC COATED) ORAL DAILY
Qty: 14 TAB | Refills: 2 | Status: SHIPPED | OUTPATIENT
Start: 2019-01-17 | End: 2019-01-24

## 2019-01-17 RX ORDER — MEDROXYPROGESTERONE ACETATE 10 MG/1
10 TABLET ORAL DAILY
Qty: 30 TAB | Refills: 12 | Status: SHIPPED | OUTPATIENT
Start: 2019-01-17 | End: 2020-07-01

## 2019-01-17 NOTE — PROGRESS NOTES
Chief Complaint   Patient presents with    Post OP Follow Up     Pt presents in office post operative pt had a Hysteroscopy, Dilatation, and Curettage with Myosure on 12/10/18 pt complaints of sporatic pelvic cramping. 1. Have you been to the ER, urgent care clinic since your last visit? Hospitalized since your last visit? No    2. Have you seen or consulted any other health care providers outside of the [address] [place] [address] since your last visit? Include any pap smears or colon screening.  No

## 2019-01-17 NOTE — PROGRESS NOTES
35 y.o. y/o who presents for postoperative exam.  Pt is s/p Hysteroscopy, D&C with Myosure on 12/10/18. Pt is doing well overall. Has occasional cramping. Not every day, but every couple of days. Is painful when it comes. Lasts for about an hour. Relieved with tylenol. Not moving bowels every day, \"has not pooped in a while\". Patient also reports bilateral milky nipple discharge, \"since her 20s\"    Surgical pathology:    Endometrium, curetting:   Benign proliferative endometrium with focal glandular crowding and focal stromal and glandular   breakdown admixed with abundant myometrial fragments   Benign cervix, mostly as detached glands and occasional squamous epithelium         ROS: Negative other than per HPI     Physical exam:  Gen: AOx3, NAD  Resp: No respiratory distress  Abd: Soft, nontender  GYN: deferred    Plan:  - Cleared of all restrictions  - discussed options of menstrual control with patient. Considering Mirena. Will place on daily provera 10mg until then. - colace bid and bisacodyl PO daily for up to 7 days. - prolactin level for nipple discharge.   Will call patient with results    Return to clinic PRN of for annual exam

## 2019-01-17 NOTE — PATIENT INSTRUCTIONS
Nipple Discharge: Care Instructions  Your Care Instructions  Fluid leaking from one or both nipples when you are not breastfeeding is called nipple discharge. Clear, cloudy, or white discharge that appears only when you press on your nipple is usually normal. The more the nipple is pressed or stimulated, the more fluid appears. Yellow, green, or brown discharge is not normal and may be a symptom of an infection or other problem. Spontaneous discharge appears without pressing or stimulating the nipple. This is not normal unless you are pregnant or breastfeeding. It may be a side effect of a medicine, or it may be caused by other health problems. The treatment of spontaneous nipple discharge depends on what is causing it. You may need additional tests to find out what is causing the nipple discharge. Follow-up care is a key part of your treatment and safety. Be sure to make and go to all appointments, and call your doctor if you are having problems. It's also a good idea to know your test results and keep a list of the medicines you take. How can you care for yourself at home? · If your doctor gave you medicine, take it exactly as prescribed. Call your doctor if you think you are having a problem with your medicine. · Wear a supportive bra, such as a sports bra or jog bra. · Avoid stimulating your breast until you have your follow-up appointment. When should you call for help? Call your doctor now or seek immediate medical care if:    · You have symptoms of a breast infection, such as:  ? Increased pain, swelling, redness, or warmth around a breast.  ? Red streaks extending from the breast.  ? Pus draining from a breast.  ? A fever.    Watch closely for changes in your health, and be sure to contact your doctor if:    · You notice any changes in your breast or discharge.     · You do not get better as expected. Where can you learn more? Go to http://emelina-nile.info/.   Enter M543 in the search box to learn more about \"Nipple Discharge: Care Instructions. \"  Current as of: November 20, 2017  Content Version: 11.8  © 7687-0166 [email or web address]. Care instructions adapted under license by [employer] (which disclaims liability or warranty for this information). If you have questions about a medical condition or this instruction, always ask your healthcare professional. [facility] [address] any warranty or liability for your use of this information. Constipation: Care Instructions  Your Care Instructions    Constipation means that you have a hard time passing stools (bowel movements). People pass stools from 3 times a day to once every 3 days. What is normal for you may be different. Constipation may occur with pain in the rectum and cramping. The pain may get worse when you try to pass stools. Sometimes there are small amounts of bright red blood on toilet paper or the surface of stools. This is because of enlarged veins near the rectum (hemorrhoids). A few changes in your diet and lifestyle may help you avoid ongoing constipation. Your doctor may also prescribe medicine to help loosen your stool. Some medicines can cause constipation. These include pain medicines and antidepressants. Tell your doctor about all the medicines you take. Your doctor may want to make a medicine change to ease your symptoms. Follow-up care is a key part of your treatment and safety. Be sure to make and go to all appointments, and call your doctor if you are having problems. It's also a good idea to know your test results and keep a list of the medicines you take. How can you care for yourself at home? · Drink plenty of fluids, enough so that your urine is light yellow or clear like water. If you have kidney, heart, or liver disease and have to limit fluids, talk with your doctor before you increase the amount of fluids you drink.   · Include high-fiber foods in your diet each day. These include fruits, vegetables, beans, and whole grains. · Get at least 30 minutes of exercise on most days of the week. Walking is a good choice. You also may want to do other activities, such as running, swimming, cycling, or playing tennis or team sports. · Take a fiber supplement, such as Citrucel or Metamucil, every day. Read and follow all instructions on the label. · Schedule time each day for a bowel movement. A daily routine may help. Take your time having your bowel movement. · Support your feet with a small step stool when you sit on the toilet. This helps flex your hips and places your pelvis in a squatting position. · Your doctor may recommend an over-the-counter laxative to relieve your constipation. Examples are Milk of Magnesia and MiraLax. Read and follow all instructions on the label. Do not use laxatives on a long-term basis. When should you call for help? Call your doctor now or seek immediate medical care if:    · You have new or worse belly pain.     · You have new or worse nausea or vomiting.     · You have blood in your stools.    Watch closely for changes in your health, and be sure to contact your doctor if:    · Your constipation is getting worse.     · You do not get better as expected. Where can you learn more? Go to http://emelina-nile.info/. Enter 21 [id number] in the search box to learn more about \"Constipation: Care Instructions. \"  Current as of: November 20, 2017  Content Version: 11.8  © 2391-5365 [employer]. Care instructions adapted under license by [employer] (which disclaims liability or warranty for this information). If you have questions about a medical condition or this instruction, always ask your healthcare professional. [place] [address] any warranty or liability for your use of this information.

## 2019-02-02 LAB — PROLACTIN SERPL-MCNC: 89.2 NG/ML (ref 4.8–23.3)

## 2019-02-04 NOTE — PROGRESS NOTES
Called adv patient of results and referral to Endocrinology opportunity for questions and or concerns provided pt verbalized understanding.

## 2019-03-12 ENCOUNTER — OFFICE VISIT (OUTPATIENT)
Dept: SURGERY | Age: 34
End: 2019-03-12

## 2019-03-12 VITALS
BODY MASS INDEX: 51.91 KG/M2 | OXYGEN SATURATION: 98 % | WEIGHT: 293 LBS | DIASTOLIC BLOOD PRESSURE: 121 MMHG | HEIGHT: 63 IN | SYSTOLIC BLOOD PRESSURE: 182 MMHG | TEMPERATURE: 97.9 F | RESPIRATION RATE: 18 BRPM | HEART RATE: 90 BPM

## 2019-03-12 DIAGNOSIS — N93.9 ABNORMAL UTERINE BLEEDING (AUB): ICD-10-CM

## 2019-03-12 DIAGNOSIS — B00.9 HSV INFECTION: ICD-10-CM

## 2019-03-12 DIAGNOSIS — N90.89 VULVAR LESION: Primary | ICD-10-CM

## 2019-03-12 RX ORDER — VALACYCLOVIR HYDROCHLORIDE 500 MG/1
500 TABLET, FILM COATED ORAL 2 TIMES DAILY
Qty: 10 TAB | Refills: 6 | Status: SHIPPED | OUTPATIENT
Start: 2019-03-12 | End: 2019-03-17

## 2019-03-12 RX ORDER — HYDROCHLOROTHIAZIDE 12.5 MG/1
25 CAPSULE ORAL DAILY
Qty: 30 CAP | Refills: 3 | Status: SHIPPED | OUTPATIENT
Start: 2019-03-12 | End: 2020-07-01

## 2019-03-12 RX ORDER — LISINOPRIL 30 MG/1
30 TABLET ORAL DAILY
Qty: 30 TAB | Refills: 3 | Status: SHIPPED | OUTPATIENT
Start: 2019-03-12 | End: 2022-10-04
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
## 2019-03-12 NOTE — PROGRESS NOTES
Chief Complaint   Patient presents with    Other     Pt presents in office with c/o vaginal bumps x 1 week. 1. Have you been to the ER, urgent care clinic since your last visit? Hospitalized since your last visit? No    2. Have you seen or consulted any other health care providers outside of the [address] [place] [address] since your last visit? Include any pap smears or colon screening.  No

## 2019-04-05 ENCOUNTER — TELEPHONE (OUTPATIENT)
Dept: OBGYN CLINIC | Age: 34
End: 2019-04-05

## 2019-04-05 NOTE — TELEPHONE ENCOUNTER
Pt would like refills on her stool softner and laxative. Pt do not know the name of the medications.

## 2019-04-08 RX ORDER — BISACODYL 5 MG
10 TABLET, DELAYED RELEASE (ENTERIC COATED) ORAL
Qty: 14 TAB | Refills: 2 | Status: SHIPPED | OUTPATIENT
Start: 2019-04-08 | End: 2020-07-01

## 2019-04-08 RX ORDER — DOCUSATE SODIUM 100 MG/1
100 CAPSULE, LIQUID FILLED ORAL 2 TIMES DAILY
Qty: 60 CAP | Refills: 12 | Status: SHIPPED | OUTPATIENT
Start: 2019-04-08 | End: 2019-07-07

## 2019-04-17 ENCOUNTER — HOSPITAL ENCOUNTER (EMERGENCY)
Age: 34
Discharge: HOME | End: 2019-04-17
Attending: EMERGENCY MEDICINE
Payer: MEDICARE

## 2019-04-17 VITALS
SYSTOLIC BLOOD PRESSURE: 160 MMHG | HEART RATE: 97 BPM | DIASTOLIC BLOOD PRESSURE: 110 MMHG | TEMPERATURE: 98.4 F | OXYGEN SATURATION: 98 % | RESPIRATION RATE: 18 BRPM

## 2019-04-17 DIAGNOSIS — F41.1 ANXIETY STATE: Primary | ICD-10-CM

## 2019-04-17 LAB
ALBUMIN SERPL-MCNC: 3.4 G/DL (ref 3.5–5)
ALBUMIN/GLOB SERPL: 0.7 {RATIO} (ref 1.1–2.2)
ALP SERPL-CCNC: 95 U/L (ref 45–117)
ALT SERPL-CCNC: 44 U/L (ref 12–78)
ANION GAP SERPL CALC-SCNC: 5 MMOL/L (ref 5–15)
AST SERPL-CCNC: 32 U/L (ref 15–37)
BASOPHILS # BLD: 0 K/UL (ref 0–0.1)
BASOPHILS NFR BLD: 1 % (ref 0–1)
BILIRUB SERPL-MCNC: 0.2 MG/DL (ref 0.2–1)
BUN SERPL-MCNC: 8 MG/DL (ref 6–20)
BUN/CREAT SERPL: 10 (ref 12–20)
CALCIUM SERPL-MCNC: 9.5 MG/DL (ref 8.5–10.1)
CHLORIDE SERPL-SCNC: 105 MMOL/L (ref 97–108)
CO2 SERPL-SCNC: 27 MMOL/L (ref 21–32)
COMMENT, HOLDF: NORMAL
CREAT SERPL-MCNC: 0.77 MG/DL (ref 0.55–1.02)
DIFFERENTIAL METHOD BLD: ABNORMAL
EOSINOPHIL # BLD: 0.1 K/UL (ref 0–0.4)
EOSINOPHIL NFR BLD: 2 % (ref 0–7)
ERYTHROCYTE [DISTWIDTH] IN BLOOD BY AUTOMATED COUNT: 18.7 % (ref 11.5–14.5)
ETHANOL SERPL-MCNC: <10 MG/DL
GLOBULIN SER CALC-MCNC: 5.1 G/DL (ref 2–4)
GLUCOSE SERPL-MCNC: 122 MG/DL (ref 65–100)
HCT VFR BLD AUTO: 35 % (ref 35–47)
HGB BLD-MCNC: 10.7 G/DL (ref 11.5–16)
IMM GRANULOCYTES # BLD AUTO: 0 K/UL (ref 0–0.04)
IMM GRANULOCYTES NFR BLD AUTO: 0 % (ref 0–0.5)
LYMPHOCYTES # BLD: 1.9 K/UL (ref 0.8–3.5)
LYMPHOCYTES NFR BLD: 32 % (ref 12–49)
MCH RBC QN AUTO: 23 PG (ref 26–34)
MCHC RBC AUTO-ENTMCNC: 30.6 G/DL (ref 30–36.5)
MCV RBC AUTO: 75.1 FL (ref 80–99)
MONOCYTES # BLD: 0.4 K/UL (ref 0–1)
MONOCYTES NFR BLD: 7 % (ref 5–13)
NEUTS SEG # BLD: 3.5 K/UL (ref 1.8–8)
NEUTS SEG NFR BLD: 58 % (ref 32–75)
NRBC # BLD: 0 K/UL (ref 0–0.01)
NRBC BLD-RTO: 0 PER 100 WBC
PLATELET # BLD AUTO: 377 K/UL (ref 150–400)
PMV BLD AUTO: 9 FL (ref 8.9–12.9)
POTASSIUM SERPL-SCNC: 3.6 MMOL/L (ref 3.5–5.1)
PROT SERPL-MCNC: 8.5 G/DL (ref 6.4–8.2)
RBC # BLD AUTO: 4.66 M/UL (ref 3.8–5.2)
SAMPLES BEING HELD,HOLD: NORMAL
SODIUM SERPL-SCNC: 137 MMOL/L (ref 136–145)
WBC # BLD AUTO: 6 K/UL (ref 3.6–11)

## 2019-04-17 PROCEDURE — 80053 COMPREHEN METABOLIC PANEL: CPT

## 2019-04-17 PROCEDURE — 90791 PSYCH DIAGNOSTIC EVALUATION: CPT

## 2019-04-17 PROCEDURE — 99283 EMERGENCY DEPT VISIT LOW MDM: CPT

## 2019-04-17 PROCEDURE — 36415 COLL VENOUS BLD VENIPUNCTURE: CPT

## 2019-04-17 PROCEDURE — 85025 COMPLETE CBC W/AUTO DIFF WBC: CPT

## 2019-04-17 PROCEDURE — 80307 DRUG TEST PRSMV CHEM ANLYZR: CPT

## 2019-04-17 RX ORDER — HYDROXYZINE 50 MG/1
50 TABLET, FILM COATED ORAL
Qty: 30 TAB | Refills: 0 | Status: SHIPPED | OUTPATIENT
Start: 2019-04-17 | End: 2019-04-20
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
## 2019-04-17 NOTE — ED TRIAGE NOTES
Triage Note: Patient is coming in with couple month history of panic attack. Patient states that has made appointment with MD but they keep calling and canceling appointment.

## 2019-04-17 NOTE — DISCHARGE INSTRUCTIONS

## 2019-04-17 NOTE — ED TRIAGE NOTES
Patient presents from home with complaints of anxiety that has been going on \"for a couple of months\". Patient reports she has been off of her medications for ab out a year. Patient states she has been making appointments with psychologists, but they keep canceling her appointments. Patient denies SI and HI.     Patient reports visual and auditory hallucinations of her dead relatives

## 2019-04-20 ENCOUNTER — APPOINTMENT (OUTPATIENT)
Dept: CT IMAGING | Age: 34
End: 2019-04-20
Attending: EMERGENCY MEDICINE
Payer: MEDICARE

## 2019-04-20 ENCOUNTER — APPOINTMENT (OUTPATIENT)
Dept: GENERAL RADIOLOGY | Age: 34
End: 2019-04-20
Attending: EMERGENCY MEDICINE
Payer: MEDICARE

## 2019-04-20 ENCOUNTER — HOSPITAL ENCOUNTER (EMERGENCY)
Age: 34
Discharge: HOME | End: 2019-04-20
Attending: EMERGENCY MEDICINE
Payer: MEDICARE

## 2019-04-20 VITALS
WEIGHT: 293 LBS | HEIGHT: 63 IN | RESPIRATION RATE: 22 BRPM | SYSTOLIC BLOOD PRESSURE: 169 MMHG | OXYGEN SATURATION: 96 % | HEART RATE: 92 BPM | TEMPERATURE: 98.5 F | BODY MASS INDEX: 51.91 KG/M2 | DIASTOLIC BLOOD PRESSURE: 101 MMHG

## 2019-04-20 DIAGNOSIS — R07.9 ACUTE CHEST PAIN: ICD-10-CM

## 2019-04-20 DIAGNOSIS — F41.1 ANXIETY STATE: ICD-10-CM

## 2019-04-20 DIAGNOSIS — R56.9 SEIZURE (HCC): Primary | ICD-10-CM

## 2019-04-20 LAB
ALBUMIN SERPL-MCNC: 3.3 G/DL (ref 3.5–5)
ALBUMIN/GLOB SERPL: 0.7 {RATIO} (ref 1.1–2.2)
ALP SERPL-CCNC: 101 U/L (ref 45–117)
ALT SERPL-CCNC: 39 U/L (ref 12–78)
ANION GAP SERPL CALC-SCNC: 5 MMOL/L (ref 5–15)
APPEARANCE UR: ABNORMAL
AST SERPL-CCNC: 15 U/L (ref 15–37)
BACTERIA URNS QL MICRO: ABNORMAL /HPF
BASOPHILS # BLD: 0 K/UL (ref 0–0.1)
BASOPHILS NFR BLD: 0 % (ref 0–1)
BILIRUB SERPL-MCNC: 0.2 MG/DL (ref 0.2–1)
BILIRUB UR QL: NEGATIVE
BUN SERPL-MCNC: 7 MG/DL (ref 6–20)
BUN/CREAT SERPL: 9 (ref 12–20)
CALCIUM SERPL-MCNC: 9.1 MG/DL (ref 8.5–10.1)
CHLORIDE SERPL-SCNC: 103 MMOL/L (ref 97–108)
CO2 SERPL-SCNC: 28 MMOL/L (ref 21–32)
COLOR UR: ABNORMAL
COMMENT, HOLDF: NORMAL
CREAT SERPL-MCNC: 0.79 MG/DL (ref 0.55–1.02)
DIFFERENTIAL METHOD BLD: ABNORMAL
EOSINOPHIL # BLD: 0.2 K/UL (ref 0–0.4)
EOSINOPHIL NFR BLD: 3 % (ref 0–7)
EPITH CASTS URNS QL MICRO: ABNORMAL /LPF
ERYTHROCYTE [DISTWIDTH] IN BLOOD BY AUTOMATED COUNT: 18.8 % (ref 11.5–14.5)
GLOBULIN SER CALC-MCNC: 5 G/DL (ref 2–4)
GLUCOSE SERPL-MCNC: 81 MG/DL (ref 65–100)
GLUCOSE UR STRIP.AUTO-MCNC: NEGATIVE MG/DL
HCT VFR BLD AUTO: 35.5 % (ref 35–47)
HGB BLD-MCNC: 11.1 G/DL (ref 11.5–16)
HGB UR QL STRIP: ABNORMAL
IMM GRANULOCYTES # BLD AUTO: 0 K/UL (ref 0–0.04)
IMM GRANULOCYTES NFR BLD AUTO: 0 % (ref 0–0.5)
KETONES UR QL STRIP.AUTO: NEGATIVE MG/DL
LEUKOCYTE ESTERASE UR QL STRIP.AUTO: NEGATIVE
LYMPHOCYTES # BLD: 2.4 K/UL (ref 0.8–3.5)
LYMPHOCYTES NFR BLD: 33 % (ref 12–49)
MCH RBC QN AUTO: 23.6 PG (ref 26–34)
MCHC RBC AUTO-ENTMCNC: 31.3 G/DL (ref 30–36.5)
MCV RBC AUTO: 75.5 FL (ref 80–99)
MONOCYTES # BLD: 0.4 K/UL (ref 0–1)
MONOCYTES NFR BLD: 6 % (ref 5–13)
NEUTS SEG # BLD: 4.2 K/UL (ref 1.8–8)
NEUTS SEG NFR BLD: 58 % (ref 32–75)
NITRITE UR QL STRIP.AUTO: NEGATIVE
NRBC # BLD: 0 K/UL (ref 0–0.01)
NRBC BLD-RTO: 0 PER 100 WBC
PH UR STRIP: 6.5 [PH] (ref 5–8)
PLATELET # BLD AUTO: 375 K/UL (ref 150–400)
PMV BLD AUTO: 9.2 FL (ref 8.9–12.9)
POTASSIUM SERPL-SCNC: 3.7 MMOL/L (ref 3.5–5.1)
PROT SERPL-MCNC: 8.3 G/DL (ref 6.4–8.2)
PROT UR STRIP-MCNC: NEGATIVE MG/DL
RBC # BLD AUTO: 4.7 M/UL (ref 3.8–5.2)
RBC #/AREA URNS HPF: ABNORMAL /HPF (ref 0–5)
SAMPLES BEING HELD,HOLD: NORMAL
SODIUM SERPL-SCNC: 136 MMOL/L (ref 136–145)
SP GR UR REFRACTOMETRY: 1.01 (ref 1–1.03)
TROPONIN I SERPL-MCNC: <0.05 NG/ML
TROPONIN I SERPL-MCNC: <0.05 NG/ML
UR CULT HOLD, URHOLD: NORMAL
UROBILINOGEN UR QL STRIP.AUTO: 0.2 EU/DL (ref 0.2–1)
WBC # BLD AUTO: 7.3 K/UL (ref 3.6–11)
WBC URNS QL MICRO: ABNORMAL /HPF (ref 0–4)

## 2019-04-20 PROCEDURE — 70450 CT HEAD/BRAIN W/O DYE: CPT

## 2019-04-20 PROCEDURE — 93005 ELECTROCARDIOGRAM TRACING: CPT

## 2019-04-20 PROCEDURE — 96375 TX/PRO/DX INJ NEW DRUG ADDON: CPT

## 2019-04-20 PROCEDURE — 71045 X-RAY EXAM CHEST 1 VIEW: CPT

## 2019-04-20 PROCEDURE — 81001 URINALYSIS AUTO W/SCOPE: CPT

## 2019-04-20 PROCEDURE — 74011250636 HC RX REV CODE- 250/636: Performed by: EMERGENCY MEDICINE

## 2019-04-20 PROCEDURE — 74011250636 HC RX REV CODE- 250/636

## 2019-04-20 PROCEDURE — 74011000258 HC RX REV CODE- 258: Performed by: EMERGENCY MEDICINE

## 2019-04-20 PROCEDURE — 99285 EMERGENCY DEPT VISIT HI MDM: CPT

## 2019-04-20 PROCEDURE — 96374 THER/PROPH/DIAG INJ IV PUSH: CPT

## 2019-04-20 PROCEDURE — 36415 COLL VENOUS BLD VENIPUNCTURE: CPT

## 2019-04-20 PROCEDURE — 80053 COMPREHEN METABOLIC PANEL: CPT

## 2019-04-20 PROCEDURE — 85025 COMPLETE CBC W/AUTO DIFF WBC: CPT

## 2019-04-20 PROCEDURE — 84484 ASSAY OF TROPONIN QUANT: CPT

## 2019-04-20 RX ORDER — LORAZEPAM 2 MG/ML
INJECTION INTRAMUSCULAR
Status: COMPLETED
Start: 2019-04-20 | End: 2019-04-20

## 2019-04-20 RX ORDER — LEVETIRACETAM 100 MG/ML
500 SOLUTION ORAL 2 TIMES DAILY
Qty: 1 BOTTLE | Refills: 0 | Status: SHIPPED | OUTPATIENT
Start: 2019-04-20 | End: 2019-05-20

## 2019-04-20 RX ORDER — LORAZEPAM 2 MG/ML
1 INJECTION INTRAMUSCULAR
Status: COMPLETED | OUTPATIENT
Start: 2019-04-20 | End: 2019-04-20

## 2019-04-20 RX ADMIN — SODIUM CHLORIDE 500 MG: 900 INJECTION, SOLUTION INTRAVENOUS at 19:47

## 2019-04-20 RX ADMIN — LORAZEPAM 1 MG: 2 INJECTION INTRAMUSCULAR at 17:48

## 2019-04-20 RX ADMIN — LORAZEPAM 1 MG: 2 INJECTION INTRAMUSCULAR; INTRAVENOUS at 17:48

## 2019-04-20 NOTE — ED NOTES
Responded to pt keven. Pt states she is having tingling of upper lip (bilateral) and seeing blue spots in all visual fields which are not always in the same location.  Asked if this is similar to previous symptoms before having a seizure and pt stated \"Yes\"
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
## 2019-04-20 NOTE — ED TRIAGE NOTES
Pt arrives EMS from home for witnessed seizures x 2 lasting 15 min ea. Pt post ictal upon EMS arrival. Pt reports chest pain radiating to neck prior to seizure. Hx of seizure. States neurologist took her off keppra when she turned 28 yo. Pt c/o chest pain.  Denies urinary or bowel incontinence

## 2019-04-21 NOTE — DISCHARGE INSTRUCTIONS
Patient Education        Chest Pain: Care Instructions  Your Care Instructions    There are many things that can cause chest pain. Some are not serious and will get better on their own in a few days. But some kinds of chest pain need more testing and treatment. Your doctor may have recommended a follow-up visit in the next 8 to 12 hours. If you are not getting better, you may need more tests or treatment. Even though your doctor has released you, you still need to watch for any problems. The doctor carefully checked you, but sometimes problems can develop later. If you have new symptoms or if your symptoms do not get better, get medical care right away. If you have worse or different chest pain or pressure that lasts more than 5 minutes or you passed out (lost consciousness), call 911 or seek other emergency help right away. A medical visit is only one step in your treatment. Even if you feel better, you still need to do what your doctor recommends, such as going to all suggested follow-up appointments and taking medicines exactly as directed. This will help you recover and help prevent future problems. How can you care for yourself at home? · Rest until you feel better. · Take your medicine exactly as prescribed. Call your doctor if you think you are having a problem with your medicine. · Do not drive after taking a prescription pain medicine. When should you call for help? Call 911 if:    · You passed out (lost consciousness).     · You have severe difficulty breathing.     · You have symptoms of a heart attack. These may include:  ? Chest pain or pressure, or a strange feeling in your chest.  ? Sweating. ? Shortness of breath. ? Nausea or vomiting. ? Pain, pressure, or a strange feeling in your back, neck, jaw, or upper belly or in one or both shoulders or arms. ? Lightheadedness or sudden weakness. ? A fast or irregular heartbeat.   After you call 911, the  may tell you to chew 1 adult-strength or 2 to 4 low-dose aspirin. Wait for an ambulance. Do not try to drive yourself.    Call your doctor today if:    · You have any trouble breathing.     · Your chest pain gets worse.     · You are dizzy or lightheaded, or you feel like you may faint.     · You are not getting better as expected.     · You are having new or different chest pain. Where can you learn more? Go to http://emelina-nile.info/. Enter A120 in the search box to learn more about \"Chest Pain: Care Instructions. \"  Current as of: September 23, 2018  Content Version: 11.9  © 5524-6977 [employer]. Care instructions adapted under license by [employer] (which disclaims liability or warranty for this information). If you have questions about a medical condition or this instruction, always ask your healthcare professional. [place] [address] any warranty or liability for your use of this information. Patient Education        Anxiety Disorder: Care Instructions  Your Care Instructions    Anxiety is a normal reaction to stress. Difficult situations can cause you to have symptoms such as sweaty palms and a nervous feeling. In an anxiety disorder, the symptoms are far more severe. Constant worry, muscle tension, trouble sleeping, nausea and diarrhea, and other symptoms can make normal daily activities difficult or impossible. These symptoms may occur for no reason, and they can affect your work, school, or social life. Medicines, counseling, and self-care can all help. Follow-up care is a key part of your treatment and safety. Be sure to make and go to all appointments, and call your doctor if you are having problems. It's also a good idea to know your test results and keep a list of the medicines you take. How can you care for yourself at home? · Take medicines exactly as directed. Call your doctor if you think you are having a problem with your medicine.   · Go to your counseling sessions and follow-up appointments. · Recognize and accept your anxiety. Then, when you are in a situation that makes you anxious, say to yourself, \"This is not an emergency. I feel uncomfortable, but I am not in danger. I can keep going even if I feel anxious. \"  · Be kind to your body:  ? Relieve tension with exercise or a massage. ? Get enough rest.  ? Avoid alcohol, caffeine, nicotine, and illegal drugs. They can increase your anxiety level and cause sleep problems. ? Learn and do relaxation techniques. See below for more about these techniques. · Engage your mind. Get out and do something you enjoy. Go to a [employer] movie, or take a walk or hike. Plan your day. Having too much or too little to do can make you anxious. · Keep a record of your symptoms. Discuss your fears with a good friend or family member, or join a support group for people with similar problems. Talking to others sometimes relieves stress. · Get involved in social groups, or volunteer to help others. Being alone sometimes makes things seem worse than they are. · Get at least 30 minutes of exercise on most days of the week to relieve stress. Walking is a good choice. You also may want to do other activities, such as running, swimming, cycling, or playing tennis or team sports. Relaxation techniques  Do relaxation exercises 10 to 20 minutes a day. You can play soothing, relaxing music while you do them, if you wish. · Tell others in your house that you are going to do your relaxation exercises. Ask them not to disturb you. · Find a comfortable place, away from all distractions and noise. · Lie down on your back, or sit with your back straight. · Focus on your breathing. Make it slow and steady. · Breathe in through your nose. Breathe out through either your nose or mouth. · Breathe deeply, filling up the area between your navel and your rib cage. Breathe so that your belly goes up and down.   · Do not hold your breath. · Breathe like this for 5 to 10 minutes. Notice the feeling of calmness throughout your whole body. As you continue to breathe slowly and deeply, relax by doing the following for another 5 to 10 minutes:  · Tighten and relax each muscle group in your body. You can begin at your toes and work your way up to your head. · Imagine your muscle groups relaxing and becoming heavy. · Empty your mind of all thoughts. · Let yourself relax more and more deeply. · Become aware of the state of calmness that surrounds you. · When your relaxation time is over, you can bring yourself back to alertness by moving your fingers and toes and then your hands and feet and then stretching and moving your entire body. Sometimes people fall asleep during relaxation, but they usually wake up shortly afterward. · Always give yourself time to return to full alertness before you drive a car or do anything that might cause an accident if you are not fully alert. Never play a relaxation tape while you drive a car. When should you call for help? Call 911 anytime you think you may need emergency care. For example, call if:    · You feel you cannot stop from hurting yourself or someone else.   Ana Johnston the numbers for these national suicide hotlines: 1-886-251-TALK (6-423.161.1412) and 5-609-EBHDFVW (1-241.625.3467). If you or someone you know talks about suicide or feeling hopeless, get help right away.   Watch closely for changes in your health, and be sure to contact your doctor if:    · You have anxiety or fear that affects your life.     · You have symptoms of anxiety that are new or different from those you had before. Where can you learn more? Go to http://emelina-nile.info/. Enter P754 in the search box to learn more about \"Anxiety Disorder: Care Instructions. \"  Current as of: September 11, 2018  Content Version: 11.9  © 7481-6844 [employer].  Care instructions adapted under license by [facility] (which disclaims liability or warranty for this information). If you have questions about a medical condition or this instruction, always ask your healthcare professional. [address] any warranty or liability for your use of this information. Patient Education        Seizure: Care Instructions  Your Care Instructions    Seizures are caused by abnormal patterns of electrical signals in the brain. They are different for each person. Seizures can affect movement, speech, vision, or awareness. Some people have only slight shaking of a hand and do not pass out. Other people may pass out and have violent shaking of the whole body. Some people appear to stare into space. They are awake, but they can't respond normally. Later, they may not remember what happened. You may need tests to identify the type and cause of the seizures. A seizure may occur only once, or you may have them more than one time. Taking medicines as directed and following up with your doctor may help keep you from having more seizures. The doctor has checked you carefully, but problems can develop later. If you notice any problems or new symptoms, get medical treatment right away. Follow-up care is a key part of your treatment and safety. Be sure to make and go to all appointments, and call your doctor if you are having problems. It's also a good idea to know your test results and keep a list of the medicines you take. How can you care for yourself at home? · Be safe with medicines. Take your medicines exactly as prescribed. Call your doctor if you think you are having a problem with your medicine. · Do not do any activity that could be dangerous to you or others until your doctor says it is safe to do so. For example, do not drive a car, operate machinery, swim, or climb ladders.   · Be sure that anyone treating you for any health problem knows that you have had a seizure and what medicines you are taking for it. · Identify and avoid things that may make you more likely to have a seizure. These may include lack of sleep, alcohol or drug use, stress, or not eating. · Make sure you go to your follow-up appointment. When should you call for help? Call 911 anytime you think you may need emergency care. For example, call if:    · You have another seizure.     · You have more than one seizure in 24 hours.     · You have new symptoms, such as trouble walking, speaking, or thinking clearly.    Call your doctor now or seek immediate medical care if:    · You are not acting normally.    Watch closely for changes in your health, and be sure to contact your doctor if you have any problems. Where can you learn more? Go to http://emelina-nile.info/. Enter K891 in the search box to learn more about \"Seizure: Care Instructions. \"  Current as of: Guera 3, 2018  Content Version: 11.9  © 0525-2453 [employer]. Care instructions adapted under license by [employer] (which disclaims liability or warranty for this information). If you have questions about a medical condition or this instruction, always ask your healthcare professional. [place] [address] any warranty or liability for your use of this information.

## 2019-04-22 LAB
ATRIAL RATE: 80 BPM
ATRIAL RATE: 90 BPM
CALCULATED P AXIS, ECG09: 29 DEGREES
CALCULATED P AXIS, ECG09: 32 DEGREES
CALCULATED R AXIS, ECG10: 5 DEGREES
CALCULATED R AXIS, ECG10: 8 DEGREES
CALCULATED T AXIS, ECG11: 2 DEGREES
CALCULATED T AXIS, ECG11: 8 DEGREES
DIAGNOSIS, 93000: NORMAL
DIAGNOSIS, 93000: NORMAL
P-R INTERVAL, ECG05: 156 MS
P-R INTERVAL, ECG05: 162 MS
Q-T INTERVAL, ECG07: 364 MS
Q-T INTERVAL, ECG07: 394 MS
QRS DURATION, ECG06: 84 MS
QRS DURATION, ECG06: 86 MS
QTC CALCULATION (BEZET), ECG08: 445 MS
QTC CALCULATION (BEZET), ECG08: 454 MS
VENTRICULAR RATE, ECG03: 80 BPM
VENTRICULAR RATE, ECG03: 90 BPM

## 2019-05-17 ENCOUNTER — OFFICE VISIT (OUTPATIENT)
Dept: NEUROLOGY | Age: 34
End: 2019-05-17

## 2019-05-17 VITALS
SYSTOLIC BLOOD PRESSURE: 125 MMHG | HEART RATE: 65 BPM | DIASTOLIC BLOOD PRESSURE: 75 MMHG | WEIGHT: 293 LBS | BODY MASS INDEX: 51.91 KG/M2 | HEIGHT: 63 IN | OXYGEN SATURATION: 98 %

## 2019-05-17 DIAGNOSIS — F31.9 BIPOLAR 1 DISORDER (HCC): ICD-10-CM

## 2019-05-17 DIAGNOSIS — G40.319 INTRACTABLE GENERALIZED IDIOPATHIC EPILEPSY WITHOUT STATUS EPILEPTICUS (HCC): Primary | ICD-10-CM

## 2019-05-17 RX ORDER — OXCARBAZEPINE 150 MG/1
150 TABLET, FILM COATED ORAL 2 TIMES DAILY
Qty: 60 TAB | Refills: 1 | Status: SHIPPED | OUTPATIENT
Start: 2019-05-17 | End: 2020-07-01

## 2019-05-17 RX ORDER — OXCARBAZEPINE 150 MG/1
TABLET, FILM COATED ORAL
COMMUNITY
End: 2020-07-01

## 2019-05-17 RX ORDER — OXCARBAZEPINE 300 MG/1
300 TABLET, FILM COATED ORAL 2 TIMES DAILY
Qty: 60 TAB | Refills: 3 | Status: SHIPPED | OUTPATIENT
Start: 2019-06-17 | End: 2019-07-26 | Stop reason: SDUPTHER

## 2019-05-17 RX ORDER — PALIPERIDONE 3 MG/1
TABLET, EXTENDED RELEASE ORAL
COMMUNITY
End: 2020-07-01

## 2019-05-17 NOTE — PATIENT INSTRUCTIONS
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
## 2019-05-30 ENCOUNTER — OFFICE VISIT (OUTPATIENT)
Dept: CARDIOLOGY CLINIC | Age: 34
End: 2019-05-30

## 2019-05-30 DIAGNOSIS — E11.8 TYPE 2 DIABETES MELLITUS WITH COMPLICATION, UNSPECIFIED WHETHER LONG TERM INSULIN USE: ICD-10-CM

## 2019-05-30 DIAGNOSIS — R00.2 PALPITATIONS: ICD-10-CM

## 2019-05-30 DIAGNOSIS — F31.9 BIPOLAR 1 DISORDER (HCC): Primary | ICD-10-CM

## 2019-05-30 DIAGNOSIS — R07.89 ATYPICAL CHEST PAIN: ICD-10-CM

## 2019-05-30 NOTE — PROGRESS NOTES
The patients ride came and she decided she wanted to reschedule. I told her if she begins to experience S.O.B., tightness or pressure in her chest, light headed or dizzy and the pain last more than a few minutes I need you to go to the ED. She and her partner acknowledged understanding.  We double booked her for Monday at 1:00 pm.

## 2019-05-30 NOTE — PROGRESS NOTES
Satya Hall came to the office as a walk in asking to be seen right away. For chest pain beginning about a month ago. She described the pain as sharp, midsternal with possible by very unclear connection to activity. She stated she was pain free sitting in the waiting room. I instructed staff to add her to the schedule and get an EKG. Nelly Chi SIMEON went to get her and she stated her girlfriend had come and she was leaving. She refused an alternative appointment. She was advised to use the ED for recurrent and persistent pain. Chart review shows bipolar disorder, chronic obesity, DM, HTN, atypical chest pain, and palpitations. She did not use the event recorder correctly and we learned nothing, then she never came back to the office. She has concentric LVH on echo in March 2018 with grade 3 diastolic dysfunction. EKG in April 2019 is normal except for baseline sinus tachycardia and LAD consistent with morbidly obese body habitus. After soliciting the visit, and conduct of initial interview, the patient eloped without giving us any opportunity to fully interview, review medications, or examine and perform EKG. Impression: Bipolar disorder    Unstable impulsive behavior    Morbid obesity    Long history of atypical chest pain    Solicited urgent help then refused it and left.     Plan:  Insufficient current information to guide treatment    Deny Heart MD [facility] [place]

## 2019-06-04 ENCOUNTER — TELEPHONE (OUTPATIENT)
Dept: SURGERY | Age: 34
End: 2019-06-04

## 2019-06-04 NOTE — TELEPHONE ENCOUNTER
Called pt Dr. Chalino Hernandez declined her prescription request for Lisinopril and advised her to reach out to her PCP for refills since there managing her blood pressure pt was appreciative and verbalized understanding.

## 2019-06-18 RX ORDER — HYDROCHLOROTHIAZIDE 12.5 MG/1
12.5 TABLET ORAL DAILY
Qty: 30 TAB | Refills: 1 | Status: SHIPPED | OUTPATIENT
Start: 2019-06-18

## 2019-06-18 NOTE — TELEPHONE ENCOUNTER
Please make sure that patient follows up with her PCP for further refills of blood pressure medications. No further refills will be given.

## 2019-06-26 ENCOUNTER — TELEPHONE (OUTPATIENT)
Dept: NEUROLOGY | Age: 34
End: 2019-06-26

## 2019-06-27 ENCOUNTER — OFFICE VISIT (OUTPATIENT)
Dept: OBGYN CLINIC | Age: 34
End: 2019-06-27

## 2019-06-27 ENCOUNTER — HOSPITAL ENCOUNTER (OUTPATIENT)
Dept: LAB | Age: 34
Discharge: HOME | End: 2019-06-27
Payer: MEDICARE

## 2019-06-27 VITALS
SYSTOLIC BLOOD PRESSURE: 136 MMHG | RESPIRATION RATE: 18 BRPM | WEIGHT: 293 LBS | OXYGEN SATURATION: 98 % | HEIGHT: 63 IN | DIASTOLIC BLOOD PRESSURE: 88 MMHG | TEMPERATURE: 97.3 F | HEART RATE: 82 BPM | BODY MASS INDEX: 51.91 KG/M2

## 2019-06-27 DIAGNOSIS — N91.2 AMENORRHEA: ICD-10-CM

## 2019-06-27 DIAGNOSIS — A60.9 HSV (HERPES SIMPLEX VIRUS) ANOGENITAL INFECTION: ICD-10-CM

## 2019-06-27 DIAGNOSIS — Z01.419 ENCOUNTER FOR GYNECOLOGICAL EXAMINATION WITHOUT ABNORMAL FINDING: Primary | ICD-10-CM

## 2019-06-27 DIAGNOSIS — N89.8 VAGINAL ITCHING: ICD-10-CM

## 2019-06-27 DIAGNOSIS — E22.1 HYPERPROLACTINEMIA (HCC): ICD-10-CM

## 2019-06-27 DIAGNOSIS — Z12.4 PAP SMEAR FOR CERVICAL CANCER SCREENING: ICD-10-CM

## 2019-06-27 DIAGNOSIS — N89.8 VAGINAL DISCHARGE: ICD-10-CM

## 2019-06-27 PROCEDURE — 87624 HPV HI-RISK TYP POOLED RSLT: CPT

## 2019-06-27 PROCEDURE — 87625 HPV TYPES 16 & 18 ONLY: CPT

## 2019-06-27 PROCEDURE — 88175 CYTOPATH C/V AUTO FLUID REDO: CPT

## 2019-06-27 RX ORDER — MEDROXYPROGESTERONE ACETATE 10 MG/1
20 TABLET ORAL DAILY
Qty: 60 TAB | Refills: 12 | Status: SHIPPED | OUTPATIENT
Start: 2019-06-27 | End: 2020-07-01

## 2019-06-27 RX ORDER — ACYCLOVIR 400 MG/1
800 TABLET ORAL
Qty: 30 TAB | Refills: 6 | Status: SHIPPED | OUTPATIENT
Start: 2019-06-27 | End: 2019-07-02
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
## 2019-06-27 NOTE — PROGRESS NOTES
Chief Complaint   Patient presents with    Well Woman     Pt presents in office for annual exam pt reports she's still lactating and her appointment is in July with the specialist. Pt c/o vaginal itch and reports she hasn't had a period since her Hysteroscopy surgery 12/18. Pt desires a RF on Acyclovir. Last pap 6/18. 1. Have you been to the ER, urgent care clinic since your last visit? Hospitalized since your last visit? Yes, in May for boils near vaginal area    2. Have you seen or consulted any other health care providers outside of the [address] [place] [address] since your last visit? Include any pap smears or colon screening.  No

## 2019-06-27 NOTE — PATIENT INSTRUCTIONS
Genital Herpes: Care Instructions  Your Care Instructions  Genital herpes is caused by a virus called herpes simplex. There are two types of this virus. Type 2 is the type that usually causes genital herpes. But type 1 can also cause it. Type 1 is the type that causes cold sores. Genital herpes is a sexually transmitted infection (STI). The most common way to get it is through sexual or other contact with someone who has herpes. For example, the virus can spread from a sore in the genital area to the lips. And it can spread from a cold sore on the lips to the genital area. Some people are surprised to find out that they have herpes or that they gave it to someone else. This is because a lot of people who have it don't know that they have it. They may not get sores or they may have sores that they cannot see. But the virus can still be spread by a person who does not have obvious sores or symptoms. There is no cure for herpes, but antiviral medicine can help you feel better and help prevent more outbreaks. This medicine may also lower the chance of spreading the virus. Follow-up care is a key part of your treatment and safety. Be sure to make and go to all appointments, and call your doctor if you are having problems. It's also a good idea to know your test results and keep a list of the medicines you take. How can you care for yourself at home? · Be safe with medicines. If your doctor prescribes medicine, take it exactly as prescribed. Call your doctor if you think you are having a problem with your medicine. You will get more details on the specific medicines your doctor prescribes. · To reduce the pain and itching from herpes sores:  ? Wash the area with water 3 or 4 times a day. ? Keep the sores clean and dry in between baths or showers. You may want to let the sores air dry. This may feel better than a towel. ? Wear cotton underwear. Cotton absorbs moisture well.   ? Take an over-the-counter pain medicine, such as acetaminophen (Tylenol), ibuprofen (Advil, Motrin), or naproxen (Aleve). Read and follow all instructions on the label. Do not take two or more pain medicines at the same time unless the doctor told you to. Many pain medicines have acetaminophen, which is Tylenol. Too much acetaminophen (Tylenol) can be harmful. To prevent the spread of genital herpes  · Latex condoms are a good way to reduce the risk of spreading the virus. But you can still get the virus even if you use a condom. For the best protection, use condoms every time you have sex, from the beginning to the end of sexual contact. Remember that you can infect someone even if you do not have obvious symptoms or sores. · Avoid sexual contact when you have symptoms. Symptoms include sores, tingling, or pain. · Wash your hands if you touch a sore. In some cases, especially if this is your first herpes outbreak, you can spread the virus to other parts of your body or to other people. · Having one sex partner (who does not have STIs and does not have sex with anyone else) is a good way to avoid STIs. · Talk to your sex partner or partners about genital herpes. · Encourage your sex partners to get a blood test to see if they have been infected. When should you call for help? Call your doctor now or seek immediate medical care if:    · You have a new fever.     · There is increasing redness or red streaks around herpes sores.    Watch closely for changes in your health, and be sure to contact your doctor if:    · You have herpes and you think you might be pregnant.     · You have an outbreak of herpes sores, and the sores are not healing.     · You have frequent outbreaks of genital herpes sores.     · You are unable to pass urine or are constipated.     · You want to start antiviral medicine.     · You do not get better as expected. Where can you learn more? Go to http://emelina-nile.info/.   Enter G058 in the search box to learn more about \"Genital Herpes: Care Instructions. \"  Current as of: September 11, 2018  Content Version: 11.9  © 2063-5606 [employer]. Care instructions adapted under license by [facility] (which disclaims liability or warranty for this information). If you have questions about a medical condition or this instruction, always ask your healthcare professional. [address] any warranty or liability for your use of this information. Learning About Cervical Cancer Screening  What is a cervical cancer screening test?    Cervical cancer screening tests check for cancer of the cervix. The cervix is the lower part of the uterus that opens into the vagina. The test can help your doctor find early changes in the cells that could lead to cancer. Two tests can be used to screen for cervical cancer. They may be used alone or together. A Pap test.   This test looks for changes in the cells of the cervix. Abnormal cells may be a sign of cancer. A human papillomavirus (HPV) test.   This test looks for the HPV virus. Some types of HPV can cause cancer. During either test, the doctor or nurse will insert a tool called a speculum into your vagina. The speculum gently spreads apart the vaginal walls. It allows your doctor to see inside the vagina and the cervix. He or she uses a small swab or brush to collect cell samples from your cervix. Try to schedule the test when you're not having your period. To get ready for the test, avoid douches, tampons, vaginal medicines, sprays, and powders for at least a day before you have the test.  When should you have a screening test?  These guidelines apply to women who are at average risk of cervical cancer. If you don't know your risk, talk with your doctor. Women 21 to 34  · You can start having a Pap test at age 24. It is done every 3 years. · You can start having the primary HPV test at age 22.  It is done every 3 years. Women 30 to 59  · If you had both a Pap test and an HPV test last time and both were normal, you can have a Pap plus an HPV test every 5 years. · If you had only a Pap test last time, as long as your results are normal, you can have a Pap test every 3 years. · If you had only an HPV test last time, as long as your results are normal, you can have an HPV test every 3 years. Women 72 and older  · If you are age 72 or older, talk with your doctor about what's right for you. Women ages 72 and older may no longer need to be screened for cervical cancer. When to stop having screening tests depends on your medical history, your overall health, and your risk of cervical cell changes or cervical cancer. What happens after the test?  The sample of cells taken during your test will be sent to a lab so that an expert can look at the cells. It usually takes a week or two to get the results back. Pap tests  · A normal result means that the test didn't find any abnormal cells in the sample. · An abnormal result can mean many things. Most of these aren't cancer. The results of your test may be abnormal because:  ? You have an infection of the vagina or cervix, such as a yeast infection. ? You have an IUD (intrauterine device for birth control). ? You have low estrogen levels after menopause that are causing the cells to change. ? You have cell changes that may be a sign of precancer or cancer. The results are ranked based on how serious the changes might be. HPV tests  · A normal result means that the test didn't find any high-risk HPV in the sample. · An abnormal HPV test doesn't mean that you have cervical cancer. It may mean that you are infected with one or more high-risk types of HPV. This increases your chance of having cell changes that may be a sign of precancer or cancer. Follow-up care is a key part of your treatment and safety.  Be sure to make and go to all appointments, and call your doctor if you are having problems. It's also a good idea to know your test results and keep a list of the medicines you take. Where can you learn more? Go to http://emelina-nile.info/. Enter P919 in the search box to learn more about \"Learning About Cervical Cancer Screening. \"  Current as of: March 27, 2018  Content Version: 11.9  © 5520-1872 [employer]. Care instructions adapted under license by [employer] [facility] (which disclaims liability or warranty for this information). If you have questions about a medical condition or this instruction, always ask your healthcare professional. [place] [address] any warranty or liability for your use of this information. Well Visit, Ages 25 to 48: Care Instructions  Your Care Instructions    Physical exams can help you stay healthy. Your doctor has checked your overall health and may have suggested ways to take good care of yourself. He or she also may have recommended tests. At home, you can help prevent illness with healthy eating, regular exercise, and other steps. Follow-up care is a key part of your treatment and safety. Be sure to make and go to all appointments, and call your doctor if you are having problems. It's also a good idea to know your test results and keep a list of the medicines you take. How can you care for yourself at home? · Reach and stay at a healthy weight. This will lower your risk for many problems, such as obesity, diabetes, heart disease, and high blood pressure. · Get at least 30 minutes of physical activity on most days of the week. Walking is a good choice. You also may want to do other activities, such as running, swimming, cycling, or playing tennis or team sports. Discuss any changes in your exercise program with your doctor. · Do not smoke or allow others to smoke around you. If you need help quitting, talk to your doctor about stop-smoking programs and medicines.  These can increase your chances of quitting for good. · Talk to your doctor about whether you have any risk factors for sexually transmitted infections (STIs). Having one sex partner (who does not have STIs and does not have sex with anyone else) is a good way to avoid these infections. · Use birth control if you do not want to have children at this time. Talk with your doctor about the choices available and what might be best for you. · Protect your skin from too much sun. When you're outdoors from 10 a.m. to 4 p.m., stay in the shade or cover up with clothing and a hat with a wide brim. Wear sunglasses that block UV rays. Even when it's cloudy, put broad-spectrum sunscreen (SPF 30 or higher) on any exposed skin. · See a dentist one or two times a year for checkups and to have your teeth cleaned. · Wear a seat belt in the car. · Drink alcohol in moderation, if at all. That means no more than 2 drinks a day for men and 1 drink a day for women. Follow your doctor's advice about when to have certain tests. These tests can spot problems early. For everyone  · Cholesterol. Have the fat (cholesterol) in your blood tested after age 21. Your doctor will tell you how often to have this done based on your age, family history, or other things that can increase your risk for heart disease. · Blood pressure. Have your blood pressure checked during a routine doctor visit. Your doctor will tell you how often to check your blood pressure based on your age, your blood pressure results, and other factors. · Vision. Talk with your doctor about how often to have a glaucoma test.  · Diabetes. Ask your doctor whether you should have tests for diabetes. · Colon cancer. Have a test for colon cancer at age 48. You may have one of several tests. If you are younger than 48, you may need a test earlier if you have any risk factors.  Risk factors include whether you already had a precancerous polyp removed from your colon or whether your parent, brother, sister, or child has had colon cancer. For women  · Breast exam and mammogram. Talk to your doctor about when you should have a clinical breast exam and a mammogram. Medical experts differ on whether and how often women under 50 should have these tests. Your doctor can help you decide what is right for you. · Pap test and pelvic exam. Begin Pap tests at age 24. A Pap test is the best way to find cervical cancer. The test often is part of a pelvic exam. Ask how often to have this test.  · Tests for sexually transmitted infections (STIs). Ask whether you should have tests for STIs. You may be at risk if you have sex with more than one person, especially if your partners do not wear condoms. For men  · Tests for sexually transmitted infections (STIs). Ask whether you should have tests for STIs. You may be at risk if you have sex with more than one person, especially if you do not wear a condom. · Testicular cancer exam. Ask your doctor whether you should check your testicles regularly. · Prostate exam. Talk to your doctor about whether you should have a blood test (called a PSA test) for prostate cancer. Experts differ on whether and when men should have this test. Some experts suggest it if you are older than 39 and are -American or have a father or brother who got prostate cancer when he was younger than 72. When should you call for help? Watch closely for changes in your health, and be sure to contact your doctor if you have any problems or symptoms that concern you. Where can you learn more? Go to http://emelina-nile.info/. Enter P072 in the search box to learn more about \"Well Visit, Ages 25 to 48: Care Instructions. \"  Current as of: March 28, 2018  Content Version: 11.9  © 8474-9532 [employer]. Care instructions adapted under license by [employer] (which disclaims liability or warranty for this information).  If you have questions about a medical condition or this instruction, always ask your healthcare professional. [address] any warranty or liability for your use of this information. Secondary Amenorrhea: Care Instructions  Your Care Instructions    Amenorrhea means you do not have menstrual periods. There are two types. Primary amenorrhea means you never start your periods. Secondary amenorrhea means you have had periods, and then they stop, especially for more than 3 months. Even if you don't have periods, you could still get pregnant. You may not know what caused your periods to stop. Possible causes include pregnancy, hormonal changes, and losing or gaining a lot of weight quickly. Some medicines and stress could also cause it. Being active in endurance sports can also cause you to miss your period or stop menstruating. Female athletes may try to lose or maintain weight in harmful ways. These include dieting too much or binging and purging. But doing these things can lead to eating disorders, amenorrhea, and osteoporosis. If you exercise less or gain a little weight, your periods will probably start again. Your doctor may order tests to find out why your periods have stopped. Your doctor may give you the hormone progestin. It can cause you to have a period. Talk to your doctor if you do not have a period for 3 months or more. Going for a long amount of time without a period can raise your chance of getting cancer of the lining of the uterus later in life. Follow-up care is a key part of your treatment and safety. Be sure to make and go to all appointments, and call your doctor if you are having problems. It's also a good idea to know your test results and keep a list of the medicines you take. How can you care for yourself at home? · Eat a healthy, balanced diet. This includes fruits, vegetables, whole grains, proteins, and low-fat dairy products.   · Do light exercise, unless your doctor told you not to exercise. · Use birth control if you do not want to get pregnant. When should you call for help? Call your doctor now or seek immediate medical care if:    · You have severe vaginal bleeding.     · You have new or worse belly or pelvic pain.    Watch closely for changes in your health, and be sure to contact your doctor if:    · You have unusual vaginal bleeding.     · You think you might be pregnant.     · You do not get better as expected. Where can you learn more? Go to http://emelina-nile.info/. Enter [id number] in the search box to learn more about \"Secondary Amenorrhea: Care Instructions. \"  Current as of: May 14, 2018  Content Version: 11.9  © 6227-2834 [employer]. Care instructions adapted under license by [facility] (which disclaims liability or warranty for this information). If you have questions about a medical condition or this instruction, always ask your healthcare professional. [address] any warranty or liability for your use of this information.

## 2019-07-02 ENCOUNTER — TELEPHONE (OUTPATIENT)
Dept: OBGYN CLINIC | Age: 34
End: 2019-07-02

## 2019-07-02 LAB
A VAGINAE DNA VAG QL NAA+PROBE: ABNORMAL SCORE
BVAB2 DNA VAG QL NAA+PROBE: ABNORMAL SCORE
C ALBICANS DNA VAG QL NAA+PROBE: POSITIVE
C GLABRATA DNA VAG QL NAA+PROBE: NEGATIVE
MEGA1 DNA VAG QL NAA+PROBE: ABNORMAL SCORE
T VAGINALIS RRNA SPEC QL NAA+PROBE: NEGATIVE

## 2019-07-02 NOTE — TELEPHONE ENCOUNTER
Please make sure that patient follows up with her PCP for further refills of blood pressure medications. No further refills will be given. Spoke with the patient, information given with understanding verbalized to all.

## 2019-07-02 NOTE — TELEPHONE ENCOUNTER
Call received at 531am    29year old patient last seen in the office on 6/27/19. Patient calling to say that she got a text about an appointment with our office today. This nurse apologized and appointment was cancelled. Patient verbalized understanding.

## 2019-07-03 RX ORDER — METRONIDAZOLE 500 MG/1
500 TABLET ORAL 2 TIMES DAILY
Qty: 14 TAB | Refills: 0 | Status: SHIPPED | OUTPATIENT
Start: 2019-07-03 | End: 2019-07-10

## 2019-07-03 RX ORDER — FLUCONAZOLE 150 MG/1
150 TABLET ORAL
Qty: 2 TAB | Refills: 0 | Status: SHIPPED | OUTPATIENT
Start: 2019-07-03 | End: 2019-07-11

## 2019-07-03 NOTE — PROGRESS NOTES
Please let patient know that her swab showed BV and a yeast infection. Prescriptions for flagyl bid for 7 days and diflucan 1 pill with first and last dose of flagyl (1 pill every 7 days for 2 doses). Please remind patient no alcohol while taking flagyl, especially with the holiday weekend starting tomorrow. She can start the pills after the weekend if she desires. Thank you.

## 2019-07-16 ENCOUNTER — HOSPITAL ENCOUNTER (OUTPATIENT)
Dept: NEUROLOGY | Age: 34
Discharge: HOME | End: 2019-07-16
Attending: PSYCHIATRY & NEUROLOGY
Payer: MEDICARE

## 2019-07-16 DIAGNOSIS — G40.319 INTRACTABLE GENERALIZED IDIOPATHIC EPILEPSY WITHOUT STATUS EPILEPTICUS (HCC): ICD-10-CM

## 2019-07-16 PROCEDURE — 95816 EEG AWAKE AND DROWSY: CPT

## 2019-07-26 ENCOUNTER — APPOINTMENT (OUTPATIENT)
Dept: CT IMAGING | Age: 34
End: 2019-07-26
Attending: EMERGENCY MEDICINE
Payer: MEDICARE

## 2019-07-26 ENCOUNTER — HOSPITAL ENCOUNTER (EMERGENCY)
Age: 34
Discharge: HOME | End: 2019-07-26
Attending: EMERGENCY MEDICINE
Payer: MEDICARE

## 2019-07-26 ENCOUNTER — APPOINTMENT (OUTPATIENT)
Dept: GENERAL RADIOLOGY | Age: 34
End: 2019-07-26
Attending: EMERGENCY MEDICINE
Payer: MEDICARE

## 2019-07-26 VITALS
SYSTOLIC BLOOD PRESSURE: 156 MMHG | OXYGEN SATURATION: 98 % | DIASTOLIC BLOOD PRESSURE: 98 MMHG | HEART RATE: 86 BPM | RESPIRATION RATE: 20 BRPM | TEMPERATURE: 98 F

## 2019-07-26 DIAGNOSIS — S16.1XXA STRAIN OF NECK MUSCLE, INITIAL ENCOUNTER: ICD-10-CM

## 2019-07-26 DIAGNOSIS — G40.319 INTRACTABLE GENERALIZED IDIOPATHIC EPILEPSY WITHOUT STATUS EPILEPTICUS (HCC): ICD-10-CM
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
## 2019-07-26 DIAGNOSIS — S09.90XA INJURY OF HEAD, INITIAL ENCOUNTER: ICD-10-CM

## 2019-07-26 LAB
ALBUMIN SERPL-MCNC: 3.2 G/DL (ref 3.5–5)
ALBUMIN/GLOB SERPL: 0.7 {RATIO} (ref 1.1–2.2)
ALP SERPL-CCNC: 88 U/L (ref 45–117)
ALT SERPL-CCNC: 31 U/L (ref 12–78)
ANION GAP SERPL CALC-SCNC: 6 MMOL/L (ref 5–15)
AST SERPL-CCNC: 14 U/L (ref 15–37)
ATRIAL RATE: 79 BPM
BASOPHILS # BLD: 0 K/UL (ref 0–0.1)
BASOPHILS NFR BLD: 0 % (ref 0–1)
BILIRUB SERPL-MCNC: 0.2 MG/DL (ref 0.2–1)
BUN SERPL-MCNC: 8 MG/DL (ref 6–20)
BUN/CREAT SERPL: 10 (ref 12–20)
CALCIUM SERPL-MCNC: 8.6 MG/DL (ref 8.5–10.1)
CALCULATED P AXIS, ECG09: 12 DEGREES
CALCULATED R AXIS, ECG10: 4 DEGREES
CALCULATED T AXIS, ECG11: 2 DEGREES
CHLORIDE SERPL-SCNC: 104 MMOL/L (ref 97–108)
CO2 SERPL-SCNC: 28 MMOL/L (ref 21–32)
CREAT SERPL-MCNC: 0.82 MG/DL (ref 0.55–1.02)
DIAGNOSIS, 93000: NORMAL
DIFFERENTIAL METHOD BLD: ABNORMAL
EOSINOPHIL # BLD: 0.1 K/UL (ref 0–0.4)
EOSINOPHIL NFR BLD: 2 % (ref 0–7)
ERYTHROCYTE [DISTWIDTH] IN BLOOD BY AUTOMATED COUNT: 18 % (ref 11.5–14.5)
GLOBULIN SER CALC-MCNC: 4.8 G/DL (ref 2–4)
GLUCOSE SERPL-MCNC: 111 MG/DL (ref 65–100)
HCT VFR BLD AUTO: 33.4 % (ref 35–47)
HGB BLD-MCNC: 10.5 G/DL (ref 11.5–16)
IMM GRANULOCYTES # BLD AUTO: 0 K/UL (ref 0–0.04)
IMM GRANULOCYTES NFR BLD AUTO: 0 % (ref 0–0.5)
LYMPHOCYTES # BLD: 1.7 K/UL (ref 0.8–3.5)
LYMPHOCYTES NFR BLD: 23 % (ref 12–49)
MCH RBC QN AUTO: 24.4 PG (ref 26–34)
MCHC RBC AUTO-ENTMCNC: 31.4 G/DL (ref 30–36.5)
MCV RBC AUTO: 77.7 FL (ref 80–99)
MONOCYTES # BLD: 0.5 K/UL (ref 0–1)
MONOCYTES NFR BLD: 6 % (ref 5–13)
NEUTS SEG # BLD: 5 K/UL (ref 1.8–8)
NEUTS SEG NFR BLD: 69 % (ref 32–75)
NRBC # BLD: 0 K/UL (ref 0–0.01)
NRBC BLD-RTO: 0 PER 100 WBC
P-R INTERVAL, ECG05: 134 MS
PLATELET # BLD AUTO: 373 K/UL (ref 150–400)
PMV BLD AUTO: 8.9 FL (ref 8.9–12.9)
POTASSIUM SERPL-SCNC: 3.3 MMOL/L (ref 3.5–5.1)
PROT SERPL-MCNC: 8 G/DL (ref 6.4–8.2)
Q-T INTERVAL, ECG07: 380 MS
QRS DURATION, ECG06: 80 MS
QTC CALCULATION (BEZET), ECG08: 435 MS
RBC # BLD AUTO: 4.3 M/UL (ref 3.8–5.2)
SODIUM SERPL-SCNC: 138 MMOL/L (ref 136–145)
VENTRICULAR RATE, ECG03: 79 BPM
WBC # BLD AUTO: 7.3 K/UL (ref 3.6–11)

## 2019-07-26 PROCEDURE — 72125 CT NECK SPINE W/O DYE: CPT

## 2019-07-26 PROCEDURE — 80053 COMPREHEN METABOLIC PANEL: CPT

## 2019-07-26 PROCEDURE — 71045 X-RAY EXAM CHEST 1 VIEW: CPT

## 2019-07-26 PROCEDURE — 36415 COLL VENOUS BLD VENIPUNCTURE: CPT

## 2019-07-26 PROCEDURE — 99284 EMERGENCY DEPT VISIT MOD MDM: CPT

## 2019-07-26 PROCEDURE — 85025 COMPLETE CBC W/AUTO DIFF WBC: CPT

## 2019-07-26 PROCEDURE — 93005 ELECTROCARDIOGRAM TRACING: CPT

## 2019-07-26 PROCEDURE — 70450 CT HEAD/BRAIN W/O DYE: CPT

## 2019-07-26 NOTE — TELEPHONE ENCOUNTER
Patient is using a new pharmacy and would like to have the updated script for the below medciation sent     Future Appointments   Date Time Provider Eusebio Watson   7/30/2019  1:00 PM Robin Mark MD [place] [id number]   8/5/2019  1:30 PM Shirley Garcia MD [address]   10/18/2019 10:40 AM Ty Galvin MD [address] [place] [address]   7/1/2020 10:30 AM Robin Mark MD [address] [place] [address]                         Last Appointment My Department:  5/17/2019    Would you like to refill below? Requested Prescriptions     Pending Prescriptions Disp Refills    OXcarbazepine (TRILEPTAL) 300 mg tablet 60 Tab 3     Sig: Take 1 Tab by mouth two (2) times a day.
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
## 2019-07-26 NOTE — DISCHARGE INSTRUCTIONS
Patient Education        Learning About a Closed Head Injury  What is a closed head injury? A closed head injury happens when your head gets hit hard. The strong force of the blow causes your brain to shake in your skull. This movement can cause the brain to bruise, swell, or tear. Sometimes nerves or blood vessels also get damaged. This can cause bleeding in or around the brain. A concussion is a type of closed head injury. What are the symptoms? If you have a mild concussion, you may have a mild headache or feel \"not quite right. \" These symptoms are common. They usually go away over a few days to 4 weeks. But sometimes after a concussion, you feel like you can't function as well as before the injury. And you have new symptoms. This is called postconcussive syndrome. You may:  · Find it harder to solve problems, think, concentrate, or remember. · Have headaches. · Have changes in your sleep patterns, such as not being able to sleep or sleeping all the time. · Have changes in your personality. · Not be interested in your usual activities. · Feel angry or anxious without a clear reason. · Lose your sense of taste or smell. · Be dizzy, lightheaded, or unsteady. It may be hard to stand or walk. How is a closed head injury treated? Any person who may have a concussion needs to see a doctor. Some people have to stay in the hospital to be watched. Others can go home safely. If you go home, follow your doctor's instructions. He or she will tell you if you need someone to watch you closely for the next 24 hours or longer. Rest is the best treatment. Get plenty of sleep at night. And try to rest during the day. · Avoid activities that are physically or mentally demanding. These include housework, exercise, and schoolwork. And don't play video games, send text messages, or use the computer. You may need to change your school or work schedule to be able to avoid these activities.   · Ask your doctor when it's okay to drive, ride a bike, or operate machinery. · Take an over-the-counter pain medicine, such as acetaminophen (Tylenol), ibuprofen (Advil, Motrin), or naproxen (Aleve). Be safe with medicines. Read and follow all instructions on the label. · Check with your doctor before you use any other medicines for pain. · Do not drink alcohol or use illegal drugs. They can slow recovery. They can also increase your risk of getting a second head injury. Follow-up care is a key part of your treatment and safety. Be sure to make and go to all appointments, and call your doctor if you are having problems. It's also a good idea to know your test results and keep a list of the medicines you take. Where can you learn more? Go to http://emelina-nile.info/. Enter E235 in the search box to learn more about \"Learning About a Closed Head Injury. \"  Current as of: March 28, 2019  Content Version: 12.1  © 1972-8885 [employer]. Care instructions adapted under license by [employer] (which disclaims liability or warranty for this information). If you have questions about a medical condition or this instruction, always ask your healthcare professional. [address] any warranty or liability for your use of this information.

## 2019-07-27 RX ORDER — OXCARBAZEPINE 300 MG/1
300 TABLET, FILM COATED ORAL 2 TIMES DAILY
Qty: 60 TAB | Refills: 3 | Status: SHIPPED | OUTPATIENT
Start: 2019-07-27 | End: 2019-09-06 | Stop reason: SDUPTHER

## 2019-07-29 ENCOUNTER — TELEPHONE (OUTPATIENT)
Dept: NEUROLOGY | Age: 34
End: 2019-07-29

## 2019-07-31 DIAGNOSIS — G40.319 INTRACTABLE GENERALIZED IDIOPATHIC EPILEPSY WITHOUT STATUS EPILEPTICUS (HCC): ICD-10-CM

## 2019-08-02 ENCOUNTER — TELEPHONE (OUTPATIENT)
Dept: OBGYN CLINIC | Age: 34
End: 2019-08-02

## 2019-08-02 NOTE — TELEPHONE ENCOUNTER
Called pt no answer could not LVM due to hearing busy tone. (Was calling to advise patients that this will be her last RF for Hydrochlorothiazide per Dr. Jeanine Hernadez any additional refills need to come from PCP). Letter advising pt to please call the office was written and routed to Jaylene Steven to print and mail.

## 2019-08-20 ENCOUNTER — OFFICE VISIT (OUTPATIENT)
Dept: SURGERY | Age: 34
End: 2019-08-20

## 2019-08-20 VITALS
DIASTOLIC BLOOD PRESSURE: 75 MMHG | BODY MASS INDEX: 51.91 KG/M2 | WEIGHT: 293 LBS | RESPIRATION RATE: 18 BRPM | SYSTOLIC BLOOD PRESSURE: 128 MMHG | HEART RATE: 64 BPM | TEMPERATURE: 97.5 F | HEIGHT: 63 IN | OXYGEN SATURATION: 98 %

## 2019-08-20 DIAGNOSIS — B37.9 YEAST INFECTION: ICD-10-CM

## 2019-08-20 DIAGNOSIS — B00.9 HSV INFECTION: ICD-10-CM

## 2019-08-20 DIAGNOSIS — L72.0 EPIDERMAL INCLUSION CYST: ICD-10-CM

## 2019-08-20 DIAGNOSIS — Z20.2 POTENTIAL EXPOSURE TO STD: Primary | ICD-10-CM

## 2019-08-20 DIAGNOSIS — N89.8 VAGINAL DISCHARGE: ICD-10-CM

## 2019-08-20 RX ORDER — VALACYCLOVIR HYDROCHLORIDE 500 MG/1
500 TABLET, FILM COATED ORAL 2 TIMES DAILY
Qty: 10 TAB | Refills: 0 | Status: SHIPPED | OUTPATIENT
Start: 2019-08-20 | End: 2019-08-25

## 2019-08-20 RX ORDER — FLUCONAZOLE 150 MG/1
150 TABLET ORAL
Qty: 2 TAB | Refills: 0 | Status: SHIPPED | OUTPATIENT
Start: 2019-08-20 | End: 2019-08-24

## 2019-08-20 RX ORDER — SERTRALINE HYDROCHLORIDE 25 MG/1
TABLET, FILM COATED ORAL DAILY
COMMUNITY
End: 2020-11-10

## 2019-08-20 NOTE — PROGRESS NOTES
Chief Complaint   Patient presents with    Exposure to STD     Pt presents in office for STD testing and HSV testing pt c/o recurrent vaginal abscess. 1. Have you been to the ER, urgent care clinic since your last visit? Hospitalized since your last visit? No    2. Have you seen or consulted any other health care providers outside of the [address] [place] [address] since your last visit? Include any pap smears or colon screening.  No

## 2019-08-20 NOTE — PATIENT INSTRUCTIONS
Genital Herpes: Care Instructions  Your Care Instructions  Genital herpes is caused by a virus called herpes simplex. There are two types of this virus. Type 2 is the type that usually causes genital herpes. But type 1 can also cause it. Type 1 is the type that causes cold sores. Genital herpes is a sexually transmitted infection (STI). The most common way to get it is through sexual or other contact with someone who has herpes. For example, the virus can spread from a sore in the genital area to the lips. And it can spread from a cold sore on the lips to the genital area. Some people are surprised to find out that they have herpes or that they gave it to someone else. This is because a lot of people who have it don't know that they have it. They may not get sores or they may have sores that they cannot see. But the virus can still be spread by a person who does not have obvious sores or symptoms. There is no cure for herpes, but antiviral medicine can help you feel better and help prevent more outbreaks. This medicine may also lower the chance of spreading the virus. Follow-up care is a key part of your treatment and safety. Be sure to make and go to all appointments, and call your doctor if you are having problems. It's also a good idea to know your test results and keep a list of the medicines you take. How can you care for yourself at home? · Be safe with medicines. If your doctor prescribes medicine, take it exactly as prescribed. Call your doctor if you think you are having a problem with your medicine. You will get more details on the specific medicines your doctor prescribes. · To reduce the pain and itching from herpes sores:  ? Wash the area with water 3 or 4 times a day. ? Keep the sores clean and dry in between baths or showers. You may want to let the sores air dry. This may feel better than a towel. ? Wear cotton underwear. Cotton absorbs moisture well.   ? Take an over-the-counter pain medicine, such as acetaminophen (Tylenol), ibuprofen (Advil, Motrin), or naproxen (Aleve). Read and follow all instructions on the label. Do not take two or more pain medicines at the same time unless the doctor told you to. Many pain medicines have acetaminophen, which is Tylenol. Too much acetaminophen (Tylenol) can be harmful. To prevent the spread of genital herpes  · Latex condoms are a good way to reduce the risk of spreading the virus. But you can still get the virus even if you use a condom. For the best protection, use condoms every time you have sex, from the beginning to the end of sexual contact. Remember that you can infect someone even if you do not have obvious symptoms or sores. · Avoid sexual contact when you have symptoms. Symptoms include sores, tingling, or pain. · Wash your hands if you touch a sore. In some cases, especially if this is your first herpes outbreak, you can spread the virus to other parts of your body or to other people. · Having one sex partner (who does not have STIs and does not have sex with anyone else) is a good way to avoid STIs. · Talk to your sex partner or partners about genital herpes. · Encourage your sex partners to get a blood test to see if they have been infected. When should you call for help? Call your doctor now or seek immediate medical care if:    · You have a new fever.     · There is increasing redness or red streaks around herpes sores.    Watch closely for changes in your health, and be sure to contact your doctor if:    · You have herpes and you think you might be pregnant.     · You have an outbreak of herpes sores, and the sores are not healing.     · You have frequent outbreaks of genital herpes sores.     · You are unable to pass urine or are constipated.     · You want to start antiviral medicine.     · You do not get better as expected. Where can you learn more? Go to http://emelina-nile.info/.   Enter B629 in the search box to learn more about \"Genital Herpes: Care Instructions. \"  Current as of: September 11, 2018  Content Version: 12.1  © 3983-8320 [employer]. Care instructions adapted under license by [employer] (which disclaims liability or warranty for this information). If you have questions about a medical condition or this instruction, always ask your healthcare professional. [address] any warranty or liability for your use of this information. Epidermoid Cyst: Care Instructions  Your Care Instructions  An epidermoid (say \"np-uwz-FFU-moyd\") cyst is a lump just under the skin. These cysts can form when a hair follicle becomes blocked. They are common in acne and may occur on the face, neck, back, and genitals. However, they can form anywhere on the body. These cysts are not cancer and do not lead to cancer. They tend not to hurt, but they can sometimes become swollen and painful. They also may break open (rupture) and cause scarring. These cysts sometimes do not cause problems and may not need treatment. If you have a cyst that is swollen and hurts, your doctor may inject it with a medicine to help it heal. But it is more likely that a painful cyst will need to be removed. Your doctor will give you a shot of numbing medicine and cut into the cyst to drain it or remove it. This makes the symptoms go away. Follow-up care is a key part of your treatment and safety. Be sure to make and go to all appointments, and call your doctor if you are having problems. It's also a good idea to know your test results and keep a list of the medicines you take. How can you care for yourself at home? · Do not squeeze the cyst or poke it with a needle to open it. This can cause swelling, redness, and infection. · Always have a doctor look at any new lumps you get to make sure that they are not serious. When should you call for help?   Watch closely for changes in your health, and be sure to contact your doctor if:    · You have a fever, redness, or swelling after you get a shot of medicine in the cyst.     · You see or feel a new lump on your skin. Where can you learn more? Go to http://emelina-nile.info/. Enter K732 in the search box to learn more about \"Epidermoid Cyst: Care Instructions. \"  Current as of: April 1, 2019  Content Version: 12.1  © 4744-2852 [employer]. Care instructions adapted under license by [employer] (which disclaims liability or warranty for this information). If you have questions about a medical condition or this instruction, always ask your healthcare professional. [address] any warranty or liability for your use of this information. Vaginal Yeast Infection: Care Instructions  Your Care Instructions    A vaginal yeast infection is caused by too many yeast cells in the vagina. This is common in women of all ages. Itching, vaginal discharge and irritation, and other symptoms can bother you. But yeast infections don't often cause other health problems. Some medicines can increase your risk of getting a yeast infection. These include antibiotics, birth control pills, hormones, and steroids. You may also be more likely to get a yeast infection if you are pregnant, have diabetes, douche, or wear tight clothes. With treatment, most yeast infections get better in 2 to 3 days. Follow-up care is a key part of your treatment and safety. Be sure to make and go to all appointments, and call your doctor if you are having problems. It's also a good idea to know your test results and keep a list of the medicines you take. How can you care for yourself at home? · Take your medicines exactly as prescribed. Call your doctor if you think you are having a problem with your medicine. · Ask your doctor about over-the-counter (OTC) medicines for yeast infections.  They may cost less than prescription medicines. If you use an OTC treatment, read and follow all instructions on the label. · Do not use tampons while using a vaginal cream or suppository. The tampons can absorb the medicine. Use pads instead. · Wear loose cotton clothing. Do not wear nylon or other fabric that holds body heat and moisture close to the skin. · Try sleeping without underwear. · Do not scratch. Relieve itching with a cold pack or a cool bath. · Do not wash your vaginal area more than once a day. Use plain water or a mild, unscented soap. Air-dry the vaginal area. · Change out of wet swimsuits after swimming. · Do not have sex until you have finished your treatment. · Do not douche. When should you call for help? Call your doctor now or seek immediate medical care if:    · You have unexpected vaginal bleeding.     · You have new or increased pain in your vagina or pelvis.    Watch closely for changes in your health, and be sure to contact your doctor if:    · You have a fever.     · You are not getting better after 2 days.     · Your symptoms come back after you finish your medicines. Where can you learn more? Go to http://emelina-nile.info/. Enter Q730 in the search box to learn more about \"Vaginal Yeast Infection: Care Instructions. \"  Current as of: February 19, 2019  Content Version: 12.1  © 4319-6669 [employer]. Care instructions adapted under license by [employer] (which disclaims liability or warranty for this information). If you have questions about a medical condition or this instruction, always ask your healthcare professional. [place] [address] any warranty or liability for your use of this information.
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
## 2019-08-21 LAB
HIV 1+2 AB+HIV1 P24 AG SERPL QL IA: NON REACTIVE
T PALLIDUM AB SER QL IA: NEGATIVE

## 2019-08-22 LAB
A VAGINAE DNA VAG QL NAA+PROBE: ABNORMAL SCORE
BVAB2 DNA VAG QL NAA+PROBE: ABNORMAL SCORE
C ALBICANS DNA VAG QL NAA+PROBE: POSITIVE
C GLABRATA DNA VAG QL NAA+PROBE: NEGATIVE
C TRACH RRNA SPEC QL NAA+PROBE: NEGATIVE
MEGA1 DNA VAG QL NAA+PROBE: ABNORMAL SCORE
N GONORRHOEA RRNA SPEC QL NAA+PROBE: NEGATIVE
T VAGINALIS RRNA SPEC QL NAA+PROBE: POSITIVE

## 2019-08-26 RX ORDER — METRONIDAZOLE 500 MG/1
500 TABLET ORAL 2 TIMES DAILY
Qty: 14 TAB | Refills: 0 | Status: SHIPPED | OUTPATIENT
Start: 2019-08-26 | End: 2019-09-02

## 2019-08-26 NOTE — PROGRESS NOTES
Please let patient know that she has BV and Trichomonas. Both will be treated with flagyl bid for 7 days. A prescription has been sent to her preferred pharmacy. She should avoid any alcohol while taking this medication or for 24 hours after the last dose. Since Annie Patrick is an STD, she should use protection until both she and her partner are treated. I am willing to treat her partner as well if they do not have a PCP. Patient's swab was also positive for a yeast infection, for which she has been treated. Her testing was negative for the other STDs that we tested for, GC/Chlamydia/HIV/syphilis. Thank you.

## 2019-08-28 NOTE — PROGRESS NOTES
Second attempt to reach patient unsuccessful called patient no answer could not LVM letter written and routed to Jaylene Steven to print and mail.

## 2019-08-29 RX ORDER — NAPROXEN 500 MG/1
TABLET ORAL
Qty: 60 TAB | Refills: 10 | OUTPATIENT
Start: 2019-08-29 | End: 2019-10-13

## 2019-09-03 ENCOUNTER — TELEPHONE (OUTPATIENT)
Dept: SURGERY | Age: 34
End: 2019-09-03

## 2019-09-03 ENCOUNTER — TELEPHONE (OUTPATIENT)
Dept: OBGYN CLINIC | Age: 34
End: 2019-09-03

## 2019-09-03 NOTE — TELEPHONE ENCOUNTER
Pt returned offices call pt was advised of results and Rx sent to pharmacy on file pt was appreciative and verbalized understanding. Pt declined her partner being treated at this time.

## 2019-09-06 DIAGNOSIS — G40.319 INTRACTABLE GENERALIZED IDIOPATHIC EPILEPSY WITHOUT STATUS EPILEPTICUS (HCC): ICD-10-CM

## 2019-09-06 RX ORDER — OXCARBAZEPINE 300 MG/1
TABLET, FILM COATED ORAL
Qty: 60 TAB | Refills: 10 | Status: SHIPPED | OUTPATIENT
Start: 2019-09-06 | End: 2020-07-01

## 2019-09-15 ENCOUNTER — HOSPITAL ENCOUNTER (EMERGENCY)
Age: 34
Discharge: HOME | End: 2019-09-15
Attending: EMERGENCY MEDICINE | Admitting: EMERGENCY MEDICINE
Payer: MEDICARE

## 2019-09-15 VITALS
OXYGEN SATURATION: 98 % | BODY MASS INDEX: 51.91 KG/M2 | WEIGHT: 293 LBS | DIASTOLIC BLOOD PRESSURE: 88 MMHG | SYSTOLIC BLOOD PRESSURE: 159 MMHG | RESPIRATION RATE: 16 BRPM | TEMPERATURE: 98.2 F | HEIGHT: 63 IN | HEART RATE: 94 BPM

## 2019-09-15 DIAGNOSIS — J06.9 ACUTE URI: Primary | ICD-10-CM

## 2019-09-15 PROCEDURE — 99282 EMERGENCY DEPT VISIT SF MDM: CPT

## 2019-09-15 RX ORDER — ALBUTEROL SULFATE 90 UG/1
2 AEROSOL, METERED RESPIRATORY (INHALATION)
Qty: 1 INHALER | Refills: 0 | OUTPATIENT
Start: 2019-09-15 | End: 2020-02-15

## 2019-09-15 RX ORDER — BENZONATATE 100 MG/1
100 CAPSULE ORAL
Qty: 30 CAP | Refills: 0 | Status: SHIPPED | OUTPATIENT
Start: 2019-09-15 | End: 2019-09-22

## 2019-09-15 RX ORDER — DOXYCYCLINE HYCLATE 100 MG
100 TABLET ORAL 2 TIMES DAILY
Qty: 20 TAB | Refills: 0 | Status: SHIPPED | OUTPATIENT
Start: 2019-09-15 | End: 2019-09-25

## 2019-09-15 NOTE — DISCHARGE INSTRUCTIONS
Patient Education        Upper Respiratory Infection (Cold): Care Instructions  Your Care Instructions    An upper respiratory infection, or URI, is an infection of the nose, sinuses, or throat. URIs are spread by coughs, sneezes, and direct contact. The common cold is the most frequent kind of URI. The flu and sinus infections are other kinds of URIs. Almost all URIs are caused by viruses. Antibiotics won't cure them. But you can treat most infections with home care. This may include drinking lots of fluids and taking over-the-counter pain medicine. You will probably feel better in 4 to 10 days. The doctor has checked you carefully, but problems can develop later. If you notice any problems or new symptoms, get medical treatment right away. Follow-up care is a key part of your treatment and safety. Be sure to make and go to all appointments, and call your doctor if you are having problems. It's also a good idea to know your test results and keep a list of the medicines you take. How can you care for yourself at home? · To prevent dehydration, drink plenty of fluids, enough so that your urine is light yellow or clear like water. Choose water and other caffeine-free clear liquids until you feel better. If you have kidney, heart, or liver disease and have to limit fluids, talk with your doctor before you increase the amount of fluids you drink. · Take an over-the-counter pain medicine, such as acetaminophen (Tylenol), ibuprofen (Advil, Motrin), or naproxen (Aleve). Read and follow all instructions on the label. · Before you use cough and cold medicines, check the label. These medicines may not be safe for young children or for people with certain health problems. · Be careful when taking over-the-counter cold or flu medicines and Tylenol at the same time. Many of these medicines have acetaminophen, which is Tylenol. Read the labels to make sure that you are not taking more than the recommended dose.  Too much acetaminophen (Tylenol) can be harmful. · Get plenty of rest.  · Do not smoke or allow others to smoke around you. If you need help quitting, talk to your doctor about stop-smoking programs and medicines. These can increase your chances of quitting for good. When should you call for help? Call 911 anytime you think you may need emergency care. For example, call if:    · You have severe trouble breathing.    Call your doctor now or seek immediate medical care if:    · You seem to be getting much sicker.     · You have new or worse trouble breathing.     · You have a new or higher fever.     · You have a new rash.    Watch closely for changes in your health, and be sure to contact your doctor if:    · You have a new symptom, such as a sore throat, an earache, or sinus pain.     · You cough more deeply or more often, especially if you notice more mucus or a change in the color of your mucus.     · You do not get better as expected. Where can you learn more? Go to http://emelina-nile.info/. Enter L951 in the search box to learn more about \"Upper Respiratory Infection (Cold): Care Instructions. \"  Current as of: September 5, 2018  Content Version: 12.1  © 6844-7779 [employer]. Care instructions adapted under license by [employer] (which disclaims liability or warranty for this information). If you have questions about a medical condition or this instruction, always ask your healthcare professional. [place] [address] any warranty or liability for your use of this information.

## 2019-09-15 NOTE — ED NOTES
Discharge instructions were given to the patient by Santana Gomez RN. The patient left the Emergency Department ambulatory, alert and oriented and in no acute distress with 2 prescription(s). The patient was encouraged to call or return to the ED for worsening symptoms or problems and was encouraged to schedule a follow up appointment for continuing care. Patient leaving ED accompanied by family. The patient verbalized understanding of discharge instructions and prescriptions, all questions were answered. The patient has no further concerns at this time. Patient declined wheelchair transfer upon ED discharge.

## 2019-09-15 NOTE — ED NOTES
Pt presents ambulatory to ED complaining of non-productive cough x1 month. Pt reports she \"coughs so hard and throw up\", denies taking any medications for her symptoms, reports hx of asthma. Pt is alert and oriented x 4, RR even and unlabored, skin is warm and dry. Assesment completed and pt updated on plan of care. Emergency Department Nursing Plan of Care       The Nursing Plan of Care is developed from the Nursing assessment and Emergency Department Attending provider initial evaluation. The plan of care may be reviewed in the ED Provider note.     The Plan of Care was developed with the following considerations:   Patient / Family readiness to learn indicated by:verbalized understanding  Persons(s) to be included in education: patient  Barriers to Learning/Limitations:No    Signed     Jhonny Lopez RN    9/15/2019   4:25 PM
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
## 2019-10-13 ENCOUNTER — APPOINTMENT (OUTPATIENT)
Dept: CT IMAGING | Age: 34
End: 2019-10-13
Attending: EMERGENCY MEDICINE
Payer: MEDICARE

## 2019-10-13 ENCOUNTER — HOSPITAL ENCOUNTER (EMERGENCY)
Age: 34
Discharge: HOME | End: 2019-10-13
Attending: EMERGENCY MEDICINE
Payer: MEDICARE

## 2019-10-13 ENCOUNTER — APPOINTMENT (OUTPATIENT)
Dept: GENERAL RADIOLOGY | Age: 34
End: 2019-10-13
Attending: EMERGENCY MEDICINE
Payer: MEDICARE

## 2019-10-13 VITALS
WEIGHT: 293 LBS | SYSTOLIC BLOOD PRESSURE: 136 MMHG | BODY MASS INDEX: 51.91 KG/M2 | HEIGHT: 63 IN | DIASTOLIC BLOOD PRESSURE: 82 MMHG | HEART RATE: 82 BPM | TEMPERATURE: 97.6 F | OXYGEN SATURATION: 99 % | RESPIRATION RATE: 16 BRPM

## 2019-10-13 DIAGNOSIS — H92.02 OTALGIA, LEFT: ICD-10-CM

## 2019-10-13 DIAGNOSIS — R07.0 THROAT PAIN: ICD-10-CM

## 2019-10-13 DIAGNOSIS — R07.89 ATYPICAL CHEST PAIN: Primary | ICD-10-CM

## 2019-10-13 LAB
ALBUMIN SERPL-MCNC: 3.3 G/DL (ref 3.5–5)
ALBUMIN/GLOB SERPL: 0.7 {RATIO} (ref 1.1–2.2)
ALP SERPL-CCNC: 82 U/L (ref 45–117)
ALT SERPL-CCNC: 33 U/L (ref 12–78)
AMPHET UR QL SCN: NEGATIVE
ANION GAP SERPL CALC-SCNC: 8 MMOL/L (ref 5–15)
APPEARANCE UR: ABNORMAL
AST SERPL-CCNC: 23 U/L (ref 15–37)
ATRIAL RATE: 81 BPM
BACTERIA URNS QL MICRO: NEGATIVE /HPF
BARBITURATES UR QL SCN: NEGATIVE
BASOPHILS # BLD: 0.1 K/UL (ref 0–0.1)
BASOPHILS NFR BLD: 1 % (ref 0–1)
BENZODIAZ UR QL: NEGATIVE
BILIRUB SERPL-MCNC: 0.2 MG/DL (ref 0.2–1)
BILIRUB UR QL: NEGATIVE
BUN SERPL-MCNC: 7 MG/DL (ref 6–20)
BUN/CREAT SERPL: 10 (ref 12–20)
CALCIUM SERPL-MCNC: 8.9 MG/DL (ref 8.5–10.1)
CALCULATED P AXIS, ECG09: 37 DEGREES
CALCULATED R AXIS, ECG10: 19 DEGREES
CALCULATED T AXIS, ECG11: 14 DEGREES
CANNABINOIDS UR QL SCN: POSITIVE
CHLORIDE SERPL-SCNC: 103 MMOL/L (ref 97–108)
CO2 SERPL-SCNC: 28 MMOL/L (ref 21–32)
COCAINE UR QL SCN: NEGATIVE
COLOR UR: ABNORMAL
CREAT SERPL-MCNC: 0.71 MG/DL (ref 0.55–1.02)
DEPRECATED S PYO AG THROAT QL EIA: NEGATIVE
DIAGNOSIS, 93000: NORMAL
DIFFERENTIAL METHOD BLD: ABNORMAL
DRUG SCRN COMMENT,DRGCM: ABNORMAL
EOSINOPHIL # BLD: 0.3 K/UL (ref 0–0.4)
EOSINOPHIL NFR BLD: 4 % (ref 0–7)
EPITH CASTS URNS QL MICRO: ABNORMAL /LPF
ERYTHROCYTE [DISTWIDTH] IN BLOOD BY AUTOMATED COUNT: 18.1 % (ref 11.5–14.5)
GLOBULIN SER CALC-MCNC: 4.8 G/DL (ref 2–4)
GLUCOSE SERPL-MCNC: 119 MG/DL (ref 65–100)
GLUCOSE UR STRIP.AUTO-MCNC: NEGATIVE MG/DL
HCT VFR BLD AUTO: 37.6 % (ref 35–47)
HGB BLD-MCNC: 11.9 G/DL (ref 11.5–16)
HGB UR QL STRIP: NEGATIVE
IMM GRANULOCYTES # BLD AUTO: 0 K/UL (ref 0–0.04)
IMM GRANULOCYTES NFR BLD AUTO: 0 % (ref 0–0.5)
KETONES UR QL STRIP.AUTO: NEGATIVE MG/DL
LEUKOCYTE ESTERASE UR QL STRIP.AUTO: NEGATIVE
LIPASE SERPL-CCNC: 104 U/L (ref 73–393)
LYMPHOCYTES # BLD: 2.5 K/UL (ref 0.8–3.5)
LYMPHOCYTES NFR BLD: 39 % (ref 12–49)
MCH RBC QN AUTO: 25.3 PG (ref 26–34)
MCHC RBC AUTO-ENTMCNC: 31.6 G/DL (ref 30–36.5)
MCV RBC AUTO: 79.8 FL (ref 80–99)
METHADONE UR QL: NEGATIVE
MONOCYTES # BLD: 0.6 K/UL (ref 0–1)
MONOCYTES NFR BLD: 9 % (ref 5–13)
NEUTS SEG # BLD: 2.8 K/UL (ref 1.8–8)
NEUTS SEG NFR BLD: 47 % (ref 32–75)
NITRITE UR QL STRIP.AUTO: NEGATIVE
NRBC # BLD: 0 K/UL (ref 0–0.01)
NRBC BLD-RTO: 0 PER 100 WBC
OPIATES UR QL: NEGATIVE
P-R INTERVAL, ECG05: 158 MS
PCP UR QL: NEGATIVE
PH UR STRIP: 6 [PH] (ref 5–8)
PLATELET # BLD AUTO: 300 K/UL (ref 150–400)
POTASSIUM SERPL-SCNC: 4.2 MMOL/L (ref 3.5–5.1)
PROT SERPL-MCNC: 8.1 G/DL (ref 6.4–8.2)
PROT UR STRIP-MCNC: ABNORMAL MG/DL
Q-T INTERVAL, ECG07: 388 MS
QRS DURATION, ECG06: 90 MS
QTC CALCULATION (BEZET), ECG08: 450 MS
RBC # BLD AUTO: 4.71 M/UL (ref 3.8–5.2)
RBC #/AREA URNS HPF: ABNORMAL /HPF (ref 0–5)
RBC MORPH BLD: ABNORMAL
SODIUM SERPL-SCNC: 139 MMOL/L (ref 136–145)
SP GR UR REFRACTOMETRY: 1.02 (ref 1–1.03)
TROPONIN I SERPL-MCNC: <0.05 NG/ML
UA: UC IF INDICATED,UAUC: ABNORMAL
UROBILINOGEN UR QL STRIP.AUTO: 0.2 EU/DL (ref 0.2–1)
VENTRICULAR RATE, ECG03: 81 BPM
WBC # BLD AUTO: 6.3 K/UL (ref 3.6–11)
WBC URNS QL MICRO: ABNORMAL /HPF (ref 0–4)

## 2019-10-13 PROCEDURE — 36415 COLL VENOUS BLD VENIPUNCTURE: CPT

## 2019-10-13 PROCEDURE — 74011000250 HC RX REV CODE- 250: Performed by: EMERGENCY MEDICINE

## 2019-10-13 PROCEDURE — 74011250636 HC RX REV CODE- 250/636: Performed by: EMERGENCY MEDICINE

## 2019-10-13 PROCEDURE — 96374 THER/PROPH/DIAG INJ IV PUSH: CPT

## 2019-10-13 PROCEDURE — 71045 X-RAY EXAM CHEST 1 VIEW: CPT

## 2019-10-13 PROCEDURE — 74011636320 HC RX REV CODE- 636/320: Performed by: EMERGENCY MEDICINE

## 2019-10-13 PROCEDURE — 87880 STREP A ASSAY W/OPTIC: CPT

## 2019-10-13 PROCEDURE — 71275 CT ANGIOGRAPHY CHEST: CPT

## 2019-10-13 PROCEDURE — 87070 CULTURE OTHR SPECIMN AEROBIC: CPT

## 2019-10-13 PROCEDURE — 84484 ASSAY OF TROPONIN QUANT: CPT

## 2019-10-13 PROCEDURE — 83690 ASSAY OF LIPASE: CPT

## 2019-10-13 PROCEDURE — 80307 DRUG TEST PRSMV CHEM ANLYZR: CPT

## 2019-10-13 PROCEDURE — 85025 COMPLETE CBC W/AUTO DIFF WBC: CPT

## 2019-10-13 PROCEDURE — 80053 COMPREHEN METABOLIC PANEL: CPT

## 2019-10-13 PROCEDURE — 70491 CT SOFT TISSUE NECK W/DYE: CPT

## 2019-10-13 PROCEDURE — 99285 EMERGENCY DEPT VISIT HI MDM: CPT

## 2019-10-13 PROCEDURE — 93005 ELECTROCARDIOGRAM TRACING: CPT

## 2019-10-13 PROCEDURE — 81001 URINALYSIS AUTO W/SCOPE: CPT

## 2019-10-13 PROCEDURE — 87147 CULTURE TYPE IMMUNOLOGIC: CPT

## 2019-10-13 RX ORDER — ONDANSETRON 4 MG/1
4 TABLET, ORALLY DISINTEGRATING ORAL
Qty: 12 TAB | Refills: 4 | OUTPATIENT
Start: 2019-10-13 | End: 2021-02-21

## 2019-10-13 RX ORDER — SODIUM CHLORIDE 0.9 % (FLUSH) 0.9 %
10 SYRINGE (ML) INJECTION
Status: DISCONTINUED | OUTPATIENT
Start: 2019-10-13 | End: 2019-10-13 | Stop reason: HOSPADM

## 2019-10-13 RX ORDER — LIDOCAINE HYDROCHLORIDE 20 MG/ML
15 SOLUTION OROPHARYNGEAL
Status: COMPLETED | OUTPATIENT
Start: 2019-10-13 | End: 2019-10-13

## 2019-10-13 RX ORDER — SODIUM CHLORIDE 0.9 % (FLUSH) 0.9 %
5-40 SYRINGE (ML) INJECTION AS NEEDED
Status: DISCONTINUED | OUTPATIENT
Start: 2019-10-13 | End: 2019-10-13 | Stop reason: HOSPADM

## 2019-10-13 RX ORDER — CLINDAMYCIN HYDROCHLORIDE 150 MG/1
150 CAPSULE ORAL 4 TIMES DAILY
Qty: 28 CAP | Refills: 0 | Status: SHIPPED | OUTPATIENT
Start: 2019-10-13 | End: 2020-07-01

## 2019-10-13 RX ORDER — SODIUM CHLORIDE 0.9 % (FLUSH) 0.9 %
10 SYRINGE (ML) INJECTION
Status: COMPLETED | OUTPATIENT
Start: 2019-10-13 | End: 2019-10-13

## 2019-10-13 RX ORDER — LIDOCAINE HYDROCHLORIDE 20 MG/ML
10 SOLUTION OROPHARYNGEAL AS NEEDED
Qty: 1 BOTTLE | Refills: 0 | Status: SHIPPED | OUTPATIENT
Start: 2019-10-13 | End: 2020-07-01

## 2019-10-13 RX ORDER — SODIUM CHLORIDE 0.9 % (FLUSH) 0.9 %
5-40 SYRINGE (ML) INJECTION EVERY 8 HOURS
Status: DISCONTINUED | OUTPATIENT
Start: 2019-10-13 | End: 2019-10-13 | Stop reason: HOSPADM

## 2019-10-13 RX ADMIN — LIDOCAINE HYDROCHLORIDE 15 ML: 20 SOLUTION ORAL; TOPICAL at 11:51

## 2019-10-13 RX ADMIN — IOPAMIDOL 100 ML: 755 INJECTION, SOLUTION INTRAVENOUS at 11:01

## 2019-10-13 RX ADMIN — PROCHLORPERAZINE EDISYLATE 10 MG: 5 INJECTION INTRAMUSCULAR; INTRAVENOUS at 08:05

## 2019-10-13 RX ADMIN — Medication 30 ML: at 11:02

## 2019-10-13 RX ADMIN — IOPAMIDOL 30 ML: 755 INJECTION, SOLUTION INTRAVENOUS at 11:01
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
## 2019-10-13 NOTE — ED TRIAGE NOTES
Pt presents to the ED with c/o a sore throat, left ear pain, productive cough and chest pain with a cough. Pt is alert, oriented and appropriate. ambulatory on arrival. Pt is holding a blanket to her left side of her face. Pt aware of elevated blood pressure. Stated she hasn't taken her medication due to swallowing being painful. Emergency Department Nursing Plan of Care       The Nursing Plan of Care is developed from the Nursing assessment and Emergency Department Attending provider initial evaluation. The plan of care may be reviewed in the ED Provider note.     The Plan of Care was developed with the following considerations:   Patient / Family readiness to learn indicated by:verbalized understanding  Persons(s) to be included in education: patient  Barriers to Learning/Limitations:No    Signed     Sebastian Lance    10/13/2019   7:04 AM

## 2019-10-13 NOTE — DISCHARGE INSTRUCTIONS
Patient Education        Chest Pain: Care Instructions  Your Care Instructions    There are many things that can cause chest pain. Some are not serious and will get better on their own in a few days. But some kinds of chest pain need more testing and treatment. Your doctor may have recommended a follow-up visit in the next 8 to 12 hours. If you are not getting better, you may need more tests or treatment. Even though your doctor has released you, you still need to watch for any problems. The doctor carefully checked you, but sometimes problems can develop later. If you have new symptoms or if your symptoms do not get better, get medical care right away. If you have worse or different chest pain or pressure that lasts more than 5 minutes or you passed out (lost consciousness), call 911 or seek other emergency help right away. A medical visit is only one step in your treatment. Even if you feel better, you still need to do what your doctor recommends, such as going to all suggested follow-up appointments and taking medicines exactly as directed. This will help you recover and help prevent future problems. How can you care for yourself at home? · Rest until you feel better. · Take your medicine exactly as prescribed. Call your doctor if you think you are having a problem with your medicine. · Do not drive after taking a prescription pain medicine. When should you call for help? Call 911 if:    · You passed out (lost consciousness).     · You have severe difficulty breathing.     · You have symptoms of a heart attack. These may include:  ? Chest pain or pressure, or a strange feeling in your chest.  ? Sweating. ? Shortness of breath. ? Nausea or vomiting. ? Pain, pressure, or a strange feeling in your back, neck, jaw, or upper belly or in one or both shoulders or arms. ? Lightheadedness or sudden weakness. ? A fast or irregular heartbeat.   After you call 911, the  may tell you to chew 1 adult-strength or 2 to 4 low-dose aspirin. Wait for an ambulance. Do not try to drive yourself.    Call your doctor today if:    · You have any trouble breathing.     · Your chest pain gets worse.     · You are dizzy or lightheaded, or you feel like you may faint.     · You are not getting better as expected.     · You are having new or different chest pain. Where can you learn more? Go to http://emelina-nile.info/. Enter A120 in the search box to learn more about \"Chest Pain: Care Instructions. \"  Current as of: June 26, 2019  Content Version: 12.2  © 6421-2385 [employer]. Care instructions adapted under license by [employer] (which disclaims liability or warranty for this information). If you have questions about a medical condition or this instruction, always ask your healthcare professional. [place] [address] any warranty or liability for your use of this information. Patient Education        Earache: Care Instructions  Your Care Instructions    Even though infection is a common cause of ear pain, not all ear pain means an infection. If you have ear pain and don't have an infection, it could be because of a jaw problem, such as temporomandibular joint (TMJ) pain. Or it could be because of a neck problem. When ear discomfort or pain is mild or comes and goes without other symptoms, home treatment may be all you need. Follow-up care is a key part of your treatment and safety. Be sure to make and go to all appointments, and call your doctor if you are having problems. It's also a good idea to know your test results and keep a list of the medicines you take. How can you care for yourself at home? · Apply heat on the ear to ease pain. To apply heat, put a warm water bottle, a heating pad set on low, or a warm cloth on your ear. Do not go to sleep with a heating pad on your skin.   · Take an over-the-counter pain medicine, such as acetaminophen (Tylenol), ibuprofen (Advil, Motrin), or naproxen (Aleve). Be safe with medicines. Read and follow all instructions on the label. · Do not take two or more pain medicines at the same time unless the doctor told you to. Many pain medicines have acetaminophen, which is Tylenol. Too much acetaminophen (Tylenol) can be harmful. · Never insert anything, such as a cotton swab or a jeff pin, into the ear. When should you call for help? Call your doctor now or seek immediate medical care if:    · You have new or worse symptoms of infection, such as:  ? Increased pain, swelling, warmth, or redness. ? Red streaks leading from the area. ? Pus draining from the area. ? A fever.    Watch closely for changes in your health, and be sure to contact your doctor if:    · You have new or worse discharge coming from the ear.     · You do not get better as expected. Where can you learn more? Go to http://emelina-nile.info/. Enter B136 in the search box to learn more about \"Earache: Care Instructions. \"  Current as of: October 21, 2018  Content Version: 12.2  © 6991-7309 [employer]. Care instructions adapted under license by [employer] (which disclaims liability or warranty for this information). If you have questions about a medical condition or this instruction, always ask your healthcare professional. [place] [address] any warranty or liability for your use of this information. Patient Education        Sore Throat: Care Instructions  Your Care Instructions    Infection by bacteria or a virus causes most sore throats. Cigarette smoke, dry air, air pollution, allergies, and yelling can also cause a sore throat. Sore throats can be painful and annoying. Fortunately, most sore throats go away on their own. If you have a bacterial infection, your doctor may prescribe antibiotics. Follow-up care is a key part of your treatment and safety.  Be sure to make and go to all appointments, and call your doctor if you are having problems. It's also a good idea to know your test results and keep a list of the medicines you take. How can you care for yourself at home? · If your doctor prescribed antibiotics, take them as directed. Do not stop taking them just because you feel better. You need to take the full course of antibiotics. · Gargle with warm salt water once an hour to help reduce swelling and relieve discomfort. Use 1 teaspoon of salt mixed in 1 cup of warm water. · Take an over-the-counter pain medicine, such as acetaminophen (Tylenol), ibuprofen (Advil, Motrin), or naproxen (Aleve). Read and follow all instructions on the label. · Be careful when taking over-the-counter cold or flu medicines and Tylenol at the same time. Many of these medicines have acetaminophen, which is Tylenol. Read the labels to make sure that you are not taking more than the recommended dose. Too much acetaminophen (Tylenol) can be harmful. · Drink plenty of fluids. Fluids may help soothe an irritated throat. Hot fluids, such as tea or soup, may help decrease throat pain. · Use over-the-counter throat lozenges to soothe pain. Regular cough drops or hard candy may also help. These should not be given to young children because of the risk of choking. · Do not smoke or allow others to smoke around you. If you need help quitting, talk to your doctor about stop-smoking programs and medicines. These can increase your chances of quitting for good. · Use a vaporizer or humidifier to add moisture to your bedroom. Follow the directions for cleaning the machine. When should you call for help? Call your doctor now or seek immediate medical care if:    · You have new or worse trouble swallowing.     · Your sore throat gets much worse on one side.    Watch closely for changes in your health, and be sure to contact your doctor if you do not get better as expected.   Where can you learn more?  Go to http://emelina-nile.info/. Enter 062 [id number] in the search box to learn more about \"Sore Throat: Care Instructions. \"  Current as of: 2018  Content Version: 12.2  © 0351-1595 [employer]. Care instructions adapted under license by [employer] (which disclaims liability or warranty for this information). If you have questions about a medical condition or this instruction, always ask your healthcare professional. [address] any warranty or liability for your use of this information. [employer]    Thank you for requesting access to [employer]. Please follow the instructions below to securely access and download your online medical record. [employer] allows you to send messages to your doctor, view your test results, renew your prescriptions, schedule appointments, and more. How Do I Sign Up? 1. In your internet browser, go to [email or web address]  2. Click on the First Time User? Click Here link in the Sign In box. You will be redirect to the New Member Sign Up page. 3. Enter your [employer] Access Code exactly as it appears below. You will not need to use this code after youve completed the sign-up process. If you do not sign up before the expiration date, you must request a new code. [employer] Access Code: Activation code not generated  Current [employer] Status: Active (This is the date your [employer] access code will )    4. Enter the last four digits of your Social Security Number (xxxx) and Date of Birth (mm/dd/yyyy) as indicated and click Submit. You will be taken to the next sign-up page. 5. Create a [employer] ID. This will be your [employer] login ID and cannot be changed, so think of one that is secure and easy to remember. 6. Create a [employer] password. You can change your password at any time. 7. Enter your Password Reset Question and Answer. This can be used at a later time if you forget your password. 8. Enter your e-mail address. You will receive e-mail notification when new information is available in [address] 9. Click Sign Up. You can now view and download portions of your medical record. 10. Click the Download Summary menu link to download a portable copy of your medical information. Additional Information    If you have questions, please visit the Frequently Asked Questions section of the [employer] website at [email or web address] Remember, [employer] is NOT to be used for urgent needs. For medical emergencies, dial 911.

## 2019-10-15 LAB
BACTERIA SPEC CULT: ABNORMAL
BACTERIA SPEC CULT: ABNORMAL
SERVICE CMNT-IMP: ABNORMAL

## 2020-02-15 ENCOUNTER — HOSPITAL ENCOUNTER (EMERGENCY)
Age: 35
Discharge: HOME | End: 2020-02-15
Attending: EMERGENCY MEDICINE
Payer: MEDICARE

## 2020-02-15 VITALS — OXYGEN SATURATION: 99 % | TEMPERATURE: 99.4 F | HEART RATE: 102 BPM | RESPIRATION RATE: 16 BRPM

## 2020-02-15 DIAGNOSIS — J20.9 ACUTE BRONCHITIS, UNSPECIFIED ORGANISM: Primary | ICD-10-CM

## 2020-02-15 PROCEDURE — 99282 EMERGENCY DEPT VISIT SF MDM: CPT

## 2020-02-15 RX ORDER — PREDNISONE 5 MG/1
TABLET ORAL
Qty: 21 TAB | Refills: 0 | Status: SHIPPED | OUTPATIENT
Start: 2020-02-15 | End: 2020-07-01

## 2020-02-15 RX ORDER — ALBUTEROL SULFATE 90 UG/1
2 AEROSOL, METERED RESPIRATORY (INHALATION)
Qty: 1 INHALER | Refills: 0 | Status: SHIPPED | OUTPATIENT
Start: 2020-02-15

## 2020-02-15 RX ORDER — BENZONATATE 100 MG/1
100 CAPSULE ORAL
Qty: 30 CAP | Refills: 0 | Status: SHIPPED | OUTPATIENT
Start: 2020-02-15 | End: 2020-02-22

## 2020-02-15 RX ORDER — ALBUTEROL SULFATE 0.83 MG/ML
2.5 SOLUTION RESPIRATORY (INHALATION)
Qty: 30 NEBULE | Refills: 0 | Status: SHIPPED | OUTPATIENT
Start: 2020-02-15

## 2020-02-15 NOTE — ED TRIAGE NOTES
Reports prod cough x 1 week. Pt is taking theraflu. Pt is here with all the members of her household. One has been diagnosed with flu and others have similar symptoms.

## 2020-02-15 NOTE — DISCHARGE INSTRUCTIONS
Patient Education        Bronchitis: Care Instructions  Your Care Instructions    Bronchitis is inflammation of the bronchial tubes, which carry air to the lungs. The tubes swell and produce mucus, or phlegm. The mucus and inflamed bronchial tubes make you cough. You may have trouble breathing. Most cases of bronchitis are caused by viruses like those that cause colds. Antibiotics usually do not help and they may be harmful. Bronchitis usually develops rapidly and lasts about 2 to 3 weeks in otherwise healthy people. Follow-up care is a key part of your treatment and safety. Be sure to make and go to all appointments, and call your doctor if you are having problems. It's also a good idea to know your test results and keep a list of the medicines you take. How can you care for yourself at home? · Take all medicines exactly as prescribed. Call your doctor if you think you are having a problem with your medicine. · Get some extra rest.  · Take an over-the-counter pain medicine, such as acetaminophen (Tylenol), ibuprofen (Advil, Motrin), or naproxen (Aleve) to reduce fever and relieve body aches. Read and follow all instructions on the label. · Do not take two or more pain medicines at the same time unless the doctor told you to. Many pain medicines have acetaminophen, which is Tylenol. Too much acetaminophen (Tylenol) can be harmful. · Take an over-the-counter cough medicine that contains dextromethorphan to help quiet a dry, hacking cough so that you can sleep. Avoid cough medicines that have more than one active ingredient. Read and follow all instructions on the label. · Breathe moist air from a humidifier, hot shower, or sink filled with hot water. The heat and moisture will thin mucus so you can cough it out. · Do not smoke. Smoking can make bronchitis worse. If you need help quitting, talk to your doctor about stop-smoking programs and medicines.  These can increase your chances of quitting for good.  When should you call for help? Call 911 anytime you think you may need emergency care. For example, call if:    · You have severe trouble breathing.    Call your doctor now or seek immediate medical care if:    · You have new or worse trouble breathing.     · You cough up dark brown or bloody mucus (sputum).     · You have a new or higher fever.     · You have a new rash.    Watch closely for changes in your health, and be sure to contact your doctor if:    · You cough more deeply or more often, especially if you notice more mucus or a change in the color of your mucus.     · You are not getting better as expected. Where can you learn more? Go to http://emelina-nile.info/. Enter H333 in the search box to learn more about \"Bronchitis: Care Instructions. \"  Current as of: June 9, 2019  Content Version: 12.2  © 2865-1709 [employer]. Care instructions adapted under license by [employer] (which disclaims liability or warranty for this information). If you have questions about a medical condition or this instruction, always ask your healthcare professional. [address] any warranty or liability for your use of this information.

## 2020-02-15 NOTE — ED NOTES
Emergency Department Nursing Plan of Care       The Nursing Plan of Care is developed from the Nursing assessment and Emergency Department Attending provider initial evaluation. The plan of care may be reviewed in the ED Provider note.     The Plan of Care was developed with the following considerations:   Patient / Family readiness to learn indicated by:verbalized understanding  Persons(s) to be included in education: patient  Barriers to Learning/Limitations:No    Signed     David Hook RN    2/15/2020   1:50 PM

## 2020-02-15 NOTE — ED NOTES
Patient (s)  given copy of dc instructions and 4 script(s). Patient (s)  verbalized understanding of instructions and script (s). Patient given a current medication reconciliation form and verbalized understanding of their medications. Patient (s) verbalized understanding of the importance of discussing medications with  his or her physician or clinic they will be following up with. Patient alert and oriented and in no acute distress. Patient discharged home ambulatory with family.
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
## 2020-04-29 RX ORDER — NAPROXEN 500 MG/1
TABLET ORAL
Qty: 60 TAB | Refills: 11 | Status: SHIPPED | OUTPATIENT
Start: 2020-04-29 | End: 2020-11-10

## 2020-06-09 ENCOUNTER — HOSPITAL ENCOUNTER (OUTPATIENT)
Dept: GENERAL RADIOLOGY | Age: 35
Discharge: HOME | End: 2020-06-09
Payer: MEDICARE

## 2020-06-09 DIAGNOSIS — M25.519 SHOULDER PAIN: ICD-10-CM

## 2020-06-09 PROCEDURE — 73030 X-RAY EXAM OF SHOULDER: CPT

## 2020-06-26 ENCOUNTER — HOSPITAL ENCOUNTER (EMERGENCY)
Age: 35
Discharge: HOME | End: 2020-06-26
Attending: EMERGENCY MEDICINE
Payer: MEDICARE

## 2020-06-26 VITALS
OXYGEN SATURATION: 99 % | DIASTOLIC BLOOD PRESSURE: 114 MMHG | RESPIRATION RATE: 17 BRPM | TEMPERATURE: 97.7 F | HEART RATE: 68 BPM | SYSTOLIC BLOOD PRESSURE: 164 MMHG

## 2020-06-26 DIAGNOSIS — A60.00 GENITAL HERPES SIMPLEX, UNSPECIFIED SITE: ICD-10-CM

## 2020-06-26 DIAGNOSIS — N76.0 BV (BACTERIAL VAGINOSIS): Primary | ICD-10-CM

## 2020-06-26 DIAGNOSIS — B96.89 BV (BACTERIAL VAGINOSIS): Primary | ICD-10-CM

## 2020-06-26 LAB
APPEARANCE UR: ABNORMAL
BACTERIA URNS QL MICRO: NEGATIVE /HPF
BILIRUB UR QL: NEGATIVE
CLUE CELLS VAG QL WET PREP: NORMAL
COLOR UR: ABNORMAL
EPITH CASTS URNS QL MICRO: ABNORMAL /LPF
GLUCOSE BLD STRIP.AUTO-MCNC: 107 MG/DL (ref 65–100)
GLUCOSE UR STRIP.AUTO-MCNC: NEGATIVE MG/DL
HGB UR QL STRIP: NEGATIVE
KETONES UR QL STRIP.AUTO: ABNORMAL MG/DL
KOH PREP SPEC: NORMAL
LEUKOCYTE ESTERASE UR QL STRIP.AUTO: ABNORMAL
MUCOUS THREADS URNS QL MICRO: ABNORMAL /LPF
NITRITE UR QL STRIP.AUTO: NEGATIVE
PH UR STRIP: 5.5 [PH] (ref 5–8)
PROT UR STRIP-MCNC: 30 MG/DL
RBC #/AREA URNS HPF: ABNORMAL /HPF (ref 0–5)
SERVICE CMNT-IMP: ABNORMAL
SERVICE CMNT-IMP: NORMAL
SP GR UR REFRACTOMETRY: >1.03
T VAGINALIS VAG QL WET PREP: NORMAL
UROBILINOGEN UR QL STRIP.AUTO: 1 EU/DL (ref 0.2–1)
WBC URNS QL MICRO: ABNORMAL /HPF (ref 0–4)

## 2020-06-26 PROCEDURE — 99284 EMERGENCY DEPT VISIT MOD MDM: CPT

## 2020-06-26 PROCEDURE — 87210 SMEAR WET MOUNT SALINE/INK: CPT

## 2020-06-26 PROCEDURE — 82962 GLUCOSE BLOOD TEST: CPT

## 2020-06-26 PROCEDURE — 81001 URINALYSIS AUTO W/SCOPE: CPT

## 2020-06-26 PROCEDURE — 87491 CHLMYD TRACH DNA AMP PROBE: CPT

## 2020-06-26 PROCEDURE — 74011250637 HC RX REV CODE- 250/637: Performed by: EMERGENCY MEDICINE

## 2020-06-26 RX ORDER — METRONIDAZOLE 500 MG/1
500 TABLET ORAL 2 TIMES DAILY
Qty: 14 TAB | Refills: 0 | Status: SHIPPED | OUTPATIENT
Start: 2020-06-26 | End: 2020-07-03

## 2020-06-26 RX ORDER — VALACYCLOVIR HYDROCHLORIDE 500 MG/1
1000 TABLET, FILM COATED ORAL
Status: COMPLETED | OUTPATIENT
Start: 2020-06-26 | End: 2020-06-26

## 2020-06-26 RX ORDER — VALACYCLOVIR HYDROCHLORIDE 1 G/1
1000 TABLET, FILM COATED ORAL 3 TIMES DAILY
Qty: 21 TAB | Refills: 0 | Status: SHIPPED | OUTPATIENT
Start: 2020-06-26 | End: 2020-07-01

## 2020-06-26 RX ADMIN — VALACYCLOVIR HYDROCHLORIDE 1000 MG: 500 TABLET, FILM COATED ORAL at 12:27
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
## 2020-06-26 NOTE — DISCHARGE INSTRUCTIONS
Patient Education        Bacterial Vaginosis: Care Instructions  Overview     Bacterial vaginosis is a type of vaginal infection. It is caused by excess growth of certain bacteria that are normally found in the vagina. Symptoms can include itching, swelling, pain when you urinate or have sex, and a gray or yellow discharge with a \"fishy\" odor. It is not considered an infection that is spread through sexual contact. Symptoms can be annoying and uncomfortable. But bacterial vaginosis does not usually cause other health problems. However, if you have it while you are pregnant, it can cause complications. While the infection may go away on its own, most doctors use antibiotics to treat it. You may have been prescribed pills or vaginal cream. With treatment, bacterial vaginosis usually clears up in 5 to 7 days. Follow-up care is a key part of your treatment and safety. Be sure to make and go to all appointments, and call your doctor if you are having problems. It's also a good idea to know your test results and keep a list of the medicines you take. How can you care for yourself at home? · Take your antibiotics as directed. Do not stop taking them just because you feel better. You need to take the full course of antibiotics. · Do not eat or drink anything that contains alcohol if you are taking metronidazole or tinidazole. · Keep using your medicine if you start your period. Use pads instead of tampons while using a vaginal cream or suppository. Tampons can absorb the medicine. · Wear loose cotton clothing. Do not wear nylon and other materials that hold body heat and moisture close to the skin. · Do not scratch. Relieve itching with a cold pack or a cool bath. · Do not wash your vaginal area more than once a day. Use plain water or a mild, unscented soap. Do not douche. When should you call for help?   Watch closely for changes in your health, and be sure to contact your doctor if:  · You have unexpected vaginal bleeding. · You have a fever. · You have new or increased pain in your vagina or pelvis. · You are not getting better after 1 week. · Your symptoms return after you finish the course of your medicine. Where can you learn more? Go to http://emelina-nile.info/  Enter X360 in the search box to learn more about \"Bacterial Vaginosis: Care Instructions. \"  Current as of: November 8, 2019               Content Version: 12.5  © 2390-3339 [employer]. Care instructions adapted under license by [employer] (which disclaims liability or warranty for this information). If you have questions about a medical condition or this instruction, always ask your healthcare professional. [place] [address] any warranty or liability for your use of this information. Patient Education        Genital Herpes: Care Instructions  Your Care Instructions  Genital herpes is caused by a virus called herpes simplex. There are two types of this virus. Type 2 is the type that usually causes genital herpes. But type 1 can also cause it. Type 1 is the type that causes cold sores. Genital herpes is a sexually transmitted infection (STI). The most common way to get it is through sexual or other contact with someone who has herpes. For example, the virus can spread from a sore in the genital area to the lips. And it can spread from a cold sore on the lips to the genital area. Some people are surprised to find out that they have herpes or that they gave it to someone else. This is because a lot of people who have it don't know that they have it. They may not get sores or they may have sores that they cannot see. But the virus can still be spread by a person who does not have obvious sores or symptoms. There is no cure for herpes, but antiviral medicine can help you feel better and help prevent more outbreaks.  This medicine may also lower the chance of spreading the virus.  Follow-up care is a key part of your treatment and safety. Be sure to make and go to all appointments, and call your doctor if you are having problems. It's also a good idea to know your test results and keep a list of the medicines you take. How can you care for yourself at home? · Be safe with medicines. If your doctor prescribes medicine, take it exactly as prescribed. Call your doctor if you think you are having a problem with your medicine. You will get more details on the specific medicines your doctor prescribes. · To reduce the pain and itching from herpes sores:  ? Wash the area with water 3 or 4 times a day. ? Keep the sores clean and dry in between baths or showers. You may want to let the sores air dry. This may feel better than a towel. ? Wear cotton underwear. Cotton absorbs moisture well. ? Take an over-the-counter pain medicine, such as acetaminophen (Tylenol), ibuprofen (Advil, Motrin), or naproxen (Aleve). Read and follow all instructions on the label. Do not take two or more pain medicines at the same time unless the doctor told you to. Many pain medicines have acetaminophen, which is Tylenol. Too much acetaminophen (Tylenol) can be harmful. To prevent the spread of genital herpes  · Latex condoms are a good way to reduce the risk of spreading the virus. But you can still get the virus even if you use a condom. For the best protection, use condoms every time you have sex, from the beginning to the end of sexual contact. Remember that you can infect someone even if you do not have obvious symptoms or sores. · Avoid sexual contact when you have symptoms. Symptoms include sores, tingling, or pain. · Wash your hands if you touch a sore. In some cases, especially if this is your first herpes outbreak, you can spread the virus to other parts of your body or to other people.   · Having one sex partner (who does not have STIs and does not have sex with anyone else) is a good way to avoid STIs.  · Talk to your sex partner or partners about genital herpes. · Encourage your sex partners to get a blood test to see if they have been infected. When should you call for help? Call your doctor now or seek immediate medical care if:  · You have a new fever. · There is increasing redness or red streaks around herpes sores. Watch closely for changes in your health, and be sure to contact your doctor if:  · You have herpes and you think you might be pregnant. · You have an outbreak of herpes sores, and the sores are not healing. · You have frequent outbreaks of genital herpes sores. · You are unable to pass urine or are constipated. · You want to start antiviral medicine. · You do not get better as expected. Where can you learn more? Go to http://emelina-nile.info/  Enter G216 in the search box to learn more about \"Genital Herpes: Care Instructions. \"  Current as of: February 26, 2020               Content Version: 12.5  © 2006-2020 [employer]. Care instructions adapted under license by [employer] (which disclaims liability or warranty for this information). If you have questions about a medical condition or this instruction, always ask your healthcare professional. [address] any warranty or liability for your use of this information.

## 2020-07-01 ENCOUNTER — OFFICE VISIT (OUTPATIENT)
Dept: OBGYN CLINIC | Age: 35
End: 2020-07-01

## 2020-07-01 VITALS
WEIGHT: 293 LBS | BODY MASS INDEX: 51.91 KG/M2 | SYSTOLIC BLOOD PRESSURE: 152 MMHG | DIASTOLIC BLOOD PRESSURE: 106 MMHG | HEIGHT: 63 IN

## 2020-07-01 DIAGNOSIS — Z01.419 WELL WOMAN EXAM WITH ROUTINE GYNECOLOGICAL EXAM: Primary | ICD-10-CM

## 2020-07-01 NOTE — PATIENT INSTRUCTIONS
Pelvic Exam: Care Instructions  Your Care Instructions     When your doctor examines all of your pelvic organs, it's called a pelvic exam. Two good reasons to have this kind of exam are to check for sexually transmitted infections (STIs) and to get a Pap test. A Pap test is also called a Pap smear. It checks for early changes that can lead to cancer of the cervix. Sometimes a pelvic exam is part of a regular checkup. Your doctor may ask you to avoid vaginal sex, tampons, vaginal medicines, vaginal sprays or powders, and douching for 1 to 2 days before the test.  Other times, women have this kind of exam at any time of the month. This is because they have pelvic pain, bleeding, or discharge. Or they may have another pelvic problem. Before your exam, it's important to share some information with your doctor. For example, if you are a survivor of rape or sexual abuse, you can talk about any concerns you may have. Your doctor will also want to know if you are pregnant or use birth control. And he or she will want to hear about any problems, surgeries, or procedures you have had in your pelvic area. You will also need to tell your doctor when your last period was. Follow-up care is a key part of your treatment and safety. Be sure to make and go to all appointments, and call your doctor if you are having problems. It's also a good idea to know your test results and keep a list of the medicines you take. How is a pelvic exam done? · During a pelvic exam, you will:  ? Take off your clothes below the waist. You will get a paper or cloth cover to put over the lower half of your body. If this is regular checkup, you may undress completely and put on a gown. ? Lie on your back on an exam table. Your feet will be raised above you. Stirrups will support your feet. · The doctor will:  ? Ask you to relax your knees. Your knees need to lean out, toward the walls. ?  Check the opening of your vagina for sores or swelling. ? Gently put a tool called a speculum into your vagina. It opens the vagina a little bit. You will feel some pressure. But if you are relaxed, it will not hurt. It lets your doctor see inside the vagina. ? Use a small brush, spatula, or swab to get a sample of cells, if you are having a Pap test or culture. The doctor then removes the speculum. ? Put on gloves and put one or two fingers of one hand into your vagina. The other hand goes on your lower belly. This lets your doctor feel your pelvic organs. You will probably feel some pressure. Try to stay relaxed. ? Put one gloved finger into your rectum and one into your vagina, if needed. This can also help check your pelvic organs. This exam takes about 10 minutes. At the end, you will get a washcloth or tissue to clean your vaginal area. You can then get dressed. Why is a pelvic exam done? A pelvic exam may be done:  · As part of a woman's regular physical checkup. The exam may include a Pap test.  · To check for vaginal infection. · To check for sexually transmitted infections, such as chlamydia or herpes. · To help find the cause of abnormal uterine bleeding. · To look for problems like uterine fibroids, ovarian cysts, or uterine prolapse. · To find the cause of pelvic or belly pain. · Before inserting an intrauterine device (IUD) for birth control. · To collect evidence if you've been sexually assaulted. What are the risks of a pelvic exam?  There is a small chance that the doctor will find something on a pelvic exam that would not have caused a problem. This is called overdiagnosis. It could lead to tests or treatment you don't need. When should you call for help? Watch closely for changes in your health, and be sure to contact your doctor if you have any problems. Where can you learn more? Go to http://www.gray.com/  Enter M421 in the search box to learn more about \"Pelvic Exam: Care Instructions. \"  Current as of: November 8, 2019               Content Version: 12.5  © 0073-8547 Healthwise, Incorporated. Care instructions adapted under license by [employer] (which disclaims liability or warranty for this information). If you have questions about a medical condition or this instruction, always ask your healthcare professional. [address] any warranty or liability for your use of this information.
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
## 2020-07-08 LAB
C TRACH RRNA CVX QL NAA+PROBE: NEGATIVE
CYTOLOGIST CVX/VAG CYTO: ABNORMAL
CYTOLOGY CVX/VAG DOC CYTO: ABNORMAL
CYTOLOGY CVX/VAG DOC THIN PREP: ABNORMAL
DX ICD CODE: ABNORMAL
LABCORP, 190119: ABNORMAL
Lab: ABNORMAL
N GONORRHOEA RRNA CVX QL NAA+PROBE: NEGATIVE
OTHER STN SPEC: ABNORMAL
STAT OF ADQ CVX/VAG CYTO-IMP: ABNORMAL
T VAGINALIS RRNA SPEC QL NAA+PROBE: POSITIVE

## 2020-07-08 RX ORDER — FLUCONAZOLE 150 MG/1
150 TABLET ORAL DAILY
Qty: 1 TAB | Refills: 0 | Status: SHIPPED | OUTPATIENT
Start: 2020-07-08 | End: 2020-07-09

## 2020-07-08 RX ORDER — METRONIDAZOLE 500 MG/1
2000 TABLET ORAL ONCE
Qty: 4 TAB | Refills: 0 | Status: SHIPPED | OUTPATIENT
Start: 2020-07-08 | End: 2020-07-08

## 2020-07-09 NOTE — PROGRESS NOTES
Per Dr. Louisa Dick- called and spoke with patient. Informed her of lab results. Patient verbalized understanding and had no questions at this time.

## 2020-10-18 ENCOUNTER — HOSPITAL ENCOUNTER (EMERGENCY)
Age: 35
Discharge: HOME | End: 2020-10-18
Attending: EMERGENCY MEDICINE | Admitting: EMERGENCY MEDICINE
Payer: MEDICARE

## 2020-10-18 VITALS
HEIGHT: 63 IN | BODY MASS INDEX: 51.91 KG/M2 | OXYGEN SATURATION: 97 % | WEIGHT: 293 LBS | RESPIRATION RATE: 18 BRPM | TEMPERATURE: 98.1 F | SYSTOLIC BLOOD PRESSURE: 178 MMHG | DIASTOLIC BLOOD PRESSURE: 89 MMHG | HEART RATE: 81 BPM

## 2020-10-18 DIAGNOSIS — N93.9 VAGINAL BLEEDING: ICD-10-CM

## 2020-10-18 DIAGNOSIS — N93.8 DUB (DYSFUNCTIONAL UTERINE BLEEDING): Primary | ICD-10-CM

## 2020-10-18 DIAGNOSIS — B37.31 YEAST VAGINITIS: ICD-10-CM

## 2020-10-18 LAB
CLUE CELLS VAG QL WET PREP: NORMAL
HCG UR QL: NEGATIVE
KOH PREP SPEC: NORMAL
SERVICE CMNT-IMP: NORMAL
T VAGINALIS VAG QL WET PREP: NORMAL

## 2020-10-18 PROCEDURE — 87210 SMEAR WET MOUNT SALINE/INK: CPT

## 2020-10-18 PROCEDURE — 81025 URINE PREGNANCY TEST: CPT

## 2020-10-18 PROCEDURE — 87491 CHLMYD TRACH DNA AMP PROBE: CPT

## 2020-10-18 PROCEDURE — 99284 EMERGENCY DEPT VISIT MOD MDM: CPT

## 2020-10-18 RX ORDER — FLUCONAZOLE 150 MG/1
150 TABLET ORAL
Qty: 1 TAB | Refills: 1 | Status: SHIPPED | OUTPATIENT
Start: 2020-10-18 | End: 2020-10-18
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
## 2020-10-18 NOTE — ED TRIAGE NOTES
Pt presents to the ED via family member c/o lower abdominal pain and vaginal bleeding x 3 days. Pt reports she has gone through 5 pads in within one hour. Pt reports passing clots and blood is dark red. Pt reports she had surgery in 2018 \"to stop my periods, they like scraped my insides out. \" Pt reports dizziness and weakness. Pt is ambulatory, vitals are stable, pt skin is warm, dry, intact, and skin color appropriate for ethnicity. Pt. Denies nausea, vomiting and diarrhea, but does report weakness and dizziness. Strong smell of marijuana present on patient. Emergency Department Nursing Plan of Care       The Nursing Plan of Care is developed from the Nursing assessment and Emergency Department Attending provider initial evaluation. The plan of care may be reviewed in the ED Provider note.     The Plan of Care was developed with the following considerations:   Patient / Family readiness to learn indicated by:verbalized understanding  Persons(s) to be included in education: patient  Barriers to Learning/Limitations:No    Signed     Dejah Brito RN    10/18/2020   5:19 AM

## 2020-10-18 NOTE — DISCHARGE INSTRUCTIONS
Patient Education        Abnormal Uterine Bleeding: Care Instructions  Your Care Instructions     Abnormal uterine bleeding is irregular bleeding from the uterus that is longer or heavier than usual or does not occur at your regular time. Sometimes it is caused by changes in hormone levels. It can also be caused by growths in the uterus, such as fibroids or polyps. Sometimes a cause cannot be found. You may have heavy bleeding when you are not expecting your period. Your doctor may suggest a pregnancy test, if you think you are pregnant. Follow-up care is a key part of your treatment and safety. Be sure to make and go to all appointments, and call your doctor if you are having problems. It's also a good idea to know your test results and keep a list of the medicines you take. How can you care for yourself at home? · Be safe with medicines. Take pain medicines exactly as directed. ? If the doctor gave you a prescription medicine for pain, take it as prescribed. ? If you are not taking a prescription pain medicine, ask your doctor if you can take an over-the-counter medicine. · You may be low in iron because of blood loss. Eat a balanced diet that is high in iron and vitamin C. Foods rich in iron include red meat, shellfish, eggs, beans, and leafy green vegetables. Talk to your doctor about whether you need to take iron pills or a multivitamin. When should you call for help? Call 911 anytime you think you may need emergency care. For example, call if:    · You passed out (lost consciousness). Call your doctor now or seek immediate medical care if:    · You have new or worse belly or pelvic pain.     · You have severe vaginal bleeding.     · You feel dizzy or lightheaded, or you feel like you may faint. Watch closely for changes in your health, and be sure to contact your doctor if:    · You think you may be pregnant.     · Your bleeding gets worse.     · You do not get better as expected.    Where can you learn more? Go to http://www.gray.com/  Enter I327 in the search box to learn more about \"Abnormal Uterine Bleeding: Care Instructions. \"  Current as of: November 8, 2019               Content Version: 12.6  © 9732-2121 [employer]. Care instructions adapted under license by [employer] (which disclaims liability or warranty for this information). If you have questions about a medical condition or this instruction, always ask your healthcare professional. [address] any warranty or liability for your use of this information. Patient Education        Vaginal Bleeding in Nonpregnant Women: Care Instructions  Your Care Instructions     Many women have bleeding or spotting between periods. Lots of things can cause it. You may bleed because of hormone problems, stress, or ovulation. Fibroids and IUDs (intrauterine devices) can also cause bleeding. If your bleeding or spotting is caused by one of these things and is not heavy or doesn't happen often, you probably don't need to worry. But in rare cases, infection, cancer, or other serious conditions can cause bleeding. So you may need more tests to find the cause of your bleeding. The doctor has checked you carefully, but problems can develop later. If you notice any problems or new symptoms, get medical treatment right away. Follow-up care is a key part of your treatment and safety. Be sure to make and go to all appointments, and call your doctor if you are having problems. It's also a good idea to know your test results and keep a list of the medicines you take. How can you care for yourself at home? · Take pain medicines exactly as directed. ? If the doctor gave you a prescription medicine for pain, take it as prescribed. ? If you are not taking a prescription pain medicine, ask your doctor if you can take an over-the-counter medicine.  Do not take aspirin, which may make bleeding worse.  · If your doctor prescribed birth control pills for your bleeding, take them as directed. · Eat foods that are high in iron and vitamin C. Foods high in iron include red meat, shellfish, eggs, beans, and leafy green vegetables. Foods high in vitamin C include citrus fruits, tomatoes, and broccoli. Ask your doctor if you need to take iron pills or a multivitamin. · Ask your doctor when it is okay to have sex. When should you call for help? Call 911 anytime you think you may need emergency care. For example, call if:    · You passed out (lost consciousness). Call your doctor now or seek immediate medical care if:    · You have severe vaginal bleeding.     · You are dizzy or lightheaded, or you feel like you may faint.     · You have new or worse belly or pelvic pain. Watch closely for changes in your health, and be sure to contact your doctor if:    · Your bleeding gets worse.     · You think you might be pregnant.     · You do not get better as expected. Where can you learn more? Go to http://www.gray.com/  Enter L814 in the search box to learn more about \"Vaginal Bleeding in Nonpregnant Women: Care Instructions. \"  Current as of: November 8, 2019               Content Version: 12.6  © 3490-4895 [employer]. Care instructions adapted under license by [employer] (which disclaims liability or warranty for this information). If you have questions about a medical condition or this instruction, always ask your healthcare professional. [place] [address] any warranty or liability for your use of this information.

## 2020-10-19 ENCOUNTER — HOSPITAL ENCOUNTER (EMERGENCY)
Age: 35
Discharge: HOME | End: 2020-10-19
Attending: STUDENT IN AN ORGANIZED HEALTH CARE EDUCATION/TRAINING PROGRAM
Payer: MEDICARE

## 2020-10-19 ENCOUNTER — TELEPHONE (OUTPATIENT)
Dept: OBGYN CLINIC | Age: 35
End: 2020-10-19

## 2020-10-19 ENCOUNTER — APPOINTMENT (OUTPATIENT)
Dept: ULTRASOUND IMAGING | Age: 35
End: 2020-10-19
Attending: EMERGENCY MEDICINE
Payer: MEDICARE

## 2020-10-19 VITALS
BODY MASS INDEX: 45.7 KG/M2 | HEIGHT: 63 IN | DIASTOLIC BLOOD PRESSURE: 88 MMHG | RESPIRATION RATE: 20 BRPM | OXYGEN SATURATION: 98 % | HEART RATE: 71 BPM | WEIGHT: 257.94 LBS | TEMPERATURE: 97.9 F | SYSTOLIC BLOOD PRESSURE: 176 MMHG

## 2020-10-19 DIAGNOSIS — N30.00 ACUTE CYSTITIS WITHOUT HEMATURIA: ICD-10-CM

## 2020-10-19 DIAGNOSIS — N93.8 DUB (DYSFUNCTIONAL UTERINE BLEEDING): Primary | ICD-10-CM

## 2020-10-19 LAB
ALBUMIN SERPL-MCNC: 3.3 G/DL (ref 3.5–5)
ALBUMIN/GLOB SERPL: 0.6 {RATIO} (ref 1.1–2.2)
ALP SERPL-CCNC: 76 U/L (ref 45–117)
ALT SERPL-CCNC: ABNORMAL U/L (ref 12–78)
ANION GAP SERPL CALC-SCNC: 4 MMOL/L (ref 5–15)
APPEARANCE UR: ABNORMAL
AST SERPL-CCNC: ABNORMAL U/L (ref 15–37)
BACTERIA URNS QL MICRO: ABNORMAL /HPF
BASOPHILS # BLD: 0 K/UL (ref 0–0.1)
BASOPHILS NFR BLD: 1 % (ref 0–1)
BILIRUB SERPL-MCNC: 0.6 MG/DL (ref 0.2–1)
BILIRUB UR QL CFM: ABNORMAL
BUN SERPL-MCNC: 6 MG/DL (ref 6–20)
BUN/CREAT SERPL: 8 (ref 12–20)
C TRACH DNA SPEC QL NAA+PROBE: NEGATIVE
CALCIUM SERPL-MCNC: 9.2 MG/DL (ref 8.5–10.1)
CHLORIDE SERPL-SCNC: 101 MMOL/L (ref 97–108)
CO2 SERPL-SCNC: 26 MMOL/L (ref 21–32)
COLOR UR: ABNORMAL
COMMENT, HOLDF: NORMAL
CREAT SERPL-MCNC: 0.8 MG/DL (ref 0.55–1.02)
DIFFERENTIAL METHOD BLD: ABNORMAL
EOSINOPHIL # BLD: 0.2 K/UL (ref 0–0.4)
EOSINOPHIL NFR BLD: 3 % (ref 0–7)
EPITH CASTS URNS QL MICRO: ABNORMAL /LPF
ERYTHROCYTE [DISTWIDTH] IN BLOOD BY AUTOMATED COUNT: 15.5 % (ref 11.5–14.5)
FERRITIN SERPL-MCNC: 80 NG/ML (ref 8–252)
GLOBULIN SER CALC-MCNC: 5.4 G/DL (ref 2–4)
GLUCOSE SERPL-MCNC: 87 MG/DL (ref 65–100)
GLUCOSE UR STRIP.AUTO-MCNC: NEGATIVE MG/DL
HCT VFR BLD AUTO: 36.6 % (ref 35–47)
HGB BLD-MCNC: 11.7 G/DL (ref 11.5–16)
HGB UR QL STRIP: ABNORMAL
IMM GRANULOCYTES # BLD AUTO: 0 K/UL (ref 0–0.04)
IMM GRANULOCYTES NFR BLD AUTO: 1 % (ref 0–0.5)
KETONES UR QL STRIP.AUTO: NEGATIVE MG/DL
LEUKOCYTE ESTERASE UR QL STRIP.AUTO: ABNORMAL
LYMPHOCYTES # BLD: 1.8 K/UL (ref 0.8–3.5)
LYMPHOCYTES NFR BLD: 32 % (ref 12–49)
MCH RBC QN AUTO: 26.7 PG (ref 26–34)
MCHC RBC AUTO-ENTMCNC: 32 G/DL (ref 30–36.5)
MCV RBC AUTO: 83.6 FL (ref 80–99)
MONOCYTES # BLD: 0.5 K/UL (ref 0–1)
MONOCYTES NFR BLD: 8 % (ref 5–13)
N GONORRHOEA DNA SPEC QL NAA+PROBE: NEGATIVE
NEUTS SEG # BLD: 3.2 K/UL (ref 1.8–8)
NEUTS SEG NFR BLD: 55 % (ref 32–75)
NITRITE UR QL STRIP.AUTO: POSITIVE
NRBC # BLD: 0 K/UL (ref 0–0.01)
NRBC BLD-RTO: 0 PER 100 WBC
PH UR STRIP: 6.5 [PH] (ref 5–8)
PLATELET # BLD AUTO: 328 K/UL (ref 150–400)
PMV BLD AUTO: 9.4 FL (ref 8.9–12.9)
POTASSIUM SERPL-SCNC: ABNORMAL MMOL/L (ref 3.5–5.1)
PROT SERPL-MCNC: 8.7 G/DL (ref 6.4–8.2)
PROT UR STRIP-MCNC: 100 MG/DL
RBC # BLD AUTO: 4.38 M/UL (ref 3.8–5.2)
RBC #/AREA URNS HPF: ABNORMAL /HPF (ref 0–5)
SAMPLE TYPE: NORMAL
SAMPLES BEING HELD,HOLD: NORMAL
SERVICE CMNT-IMP: NORMAL
SODIUM SERPL-SCNC: 131 MMOL/L (ref 136–145)
SP GR UR REFRACTOMETRY: 1.02 (ref 1–1.03)
SPECIMEN SOURCE: NORMAL
UR CULT HOLD, URHOLD: NORMAL
UROBILINOGEN UR QL STRIP.AUTO: 0.2 EU/DL (ref 0.2–1)
WBC # BLD AUTO: 5.7 K/UL (ref 3.6–11)
WBC URNS QL MICRO: ABNORMAL /HPF (ref 0–4)

## 2020-10-19 PROCEDURE — 82728 ASSAY OF FERRITIN: CPT

## 2020-10-19 PROCEDURE — 36415 COLL VENOUS BLD VENIPUNCTURE: CPT

## 2020-10-19 PROCEDURE — 76830 TRANSVAGINAL US NON-OB: CPT

## 2020-10-19 PROCEDURE — 99282 EMERGENCY DEPT VISIT SF MDM: CPT

## 2020-10-19 PROCEDURE — 85025 COMPLETE CBC W/AUTO DIFF WBC: CPT

## 2020-10-19 PROCEDURE — 76856 US EXAM PELVIC COMPLETE: CPT

## 2020-10-19 PROCEDURE — 80053 COMPREHEN METABOLIC PANEL: CPT

## 2020-10-19 PROCEDURE — 81001 URINALYSIS AUTO W/SCOPE: CPT

## 2020-10-19 PROCEDURE — 87086 URINE CULTURE/COLONY COUNT: CPT

## 2020-10-19 RX ORDER — FLUCONAZOLE 100 MG/1
200 TABLET ORAL
Status: DISCONTINUED | OUTPATIENT
Start: 2020-10-19 | End: 2020-10-19 | Stop reason: HOSPADM

## 2020-10-19 RX ORDER — NITROFURANTOIN 25; 75 MG/1; MG/1
100 CAPSULE ORAL 2 TIMES DAILY
Qty: 6 CAP | Refills: 0 | Status: SHIPPED | OUTPATIENT
Start: 2020-10-19 | End: 2020-10-22
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
## 2020-10-19 NOTE — DISCHARGE INSTRUCTIONS
Patient Education   Patient Education        Urinary Tract Infection in Women: Care Instructions  Your Care Instructions     A urinary tract infection, or UTI, is a general term for an infection anywhere between the kidneys and the urethra (where urine comes out). Most UTIs are bladder infections. They often cause pain or burning when you urinate. UTIs are caused by bacteria and can be cured with antibiotics. Be sure to complete your treatment so that the infection goes away. Follow-up care is a key part of your treatment and safety. Be sure to make and go to all appointments, and call your doctor if you are having problems. It's also a good idea to know your test results and keep a list of the medicines you take. How can you care for yourself at home? · Take your antibiotics as directed. Do not stop taking them just because you feel better. You need to take the full course of antibiotics. · Drink extra water and other fluids for the next day or two. This may help wash out the bacteria that are causing the infection. (If you have kidney, heart, or liver disease and have to limit fluids, talk with your doctor before you increase your fluid intake.)  · Avoid drinks that are carbonated or have caffeine. They can irritate the bladder. · Urinate often. Try to empty your bladder each time. · To relieve pain, take a hot bath or lay a heating pad set on low over your lower belly or genital area. Never go to sleep with a heating pad in place. To prevent UTIs  · Drink plenty of water each day. This helps you urinate often, which clears bacteria from your system. (If you have kidney, heart, or liver disease and have to limit fluids, talk with your doctor before you increase your fluid intake.)  · Urinate when you need to. · Urinate right after you have sex. · Change sanitary pads often. · Avoid douches, bubble baths, feminine hygiene sprays, and other feminine hygiene products that have deodorants.   · After going to the bathroom, wipe from front to back. When should you call for help? Call your doctor now or seek immediate medical care if:    · Symptoms such as fever, chills, nausea, or vomiting get worse or appear for the first time.     · You have new pain in your back just below your rib cage. This is called flank pain.     · There is new blood or pus in your urine.     · You have any problems with your antibiotic medicine. Watch closely for changes in your health, and be sure to contact your doctor if:    · You are not getting better after taking an antibiotic for 2 days.     · Your symptoms go away but then come back. Where can you learn more? Go to http://www.gray.com/  Enter X524 in the search box to learn more about \"Urinary Tract Infection in Women: Care Instructions. \"  Current as of: June 29, 2020               Content Version: 12.6  © 8756-4285 [employer]. Care instructions adapted under license by [employer] (which disclaims liability or warranty for this information). If you have questions about a medical condition or this instruction, always ask your healthcare professional. [address] any warranty or liability for your use of this information. Abnormal Uterine Bleeding: Care Instructions  Your Care Instructions     Abnormal uterine bleeding is irregular bleeding from the uterus that is longer or heavier than usual or does not occur at your regular time. Sometimes it is caused by changes in hormone levels. It can also be caused by growths in the uterus, such as fibroids or polyps. Sometimes a cause cannot be found. You may have heavy bleeding when you are not expecting your period. Your doctor may suggest a pregnancy test, if you think you are pregnant. Follow-up care is a key part of your treatment and safety. Be sure to make and go to all appointments, and call your doctor if you are having problems.  It's also a good idea to know your test results and keep a list of the medicines you take. How can you care for yourself at home? · Be safe with medicines. Take pain medicines exactly as directed. ? If the doctor gave you a prescription medicine for pain, take it as prescribed. ? If you are not taking a prescription pain medicine, ask your doctor if you can take an over-the-counter medicine. · You may be low in iron because of blood loss. Eat a balanced diet that is high in iron and vitamin C. Foods rich in iron include red meat, shellfish, eggs, beans, and leafy green vegetables. Talk to your doctor about whether you need to take iron pills or a multivitamin. When should you call for help? Call 911 anytime you think you may need emergency care. For example, call if:    · You passed out (lost consciousness). Call your doctor now or seek immediate medical care if:    · You have new or worse belly or pelvic pain.     · You have severe vaginal bleeding.     · You feel dizzy or lightheaded, or you feel like you may faint. Watch closely for changes in your health, and be sure to contact your doctor if:    · You think you may be pregnant.     · Your bleeding gets worse.     · You do not get better as expected. Where can you learn more? Go to http://emelina-nile.info/  Enter I327 in the search box to learn more about \"Abnormal Uterine Bleeding: Care Instructions. \"  Current as of: November 8, 2019               Content Version: 12.6  © 2680-6315 [employer]. Care instructions adapted under license by [employer] (which disclaims liability or warranty for this information). If you have questions about a medical condition or this instruction, always ask your healthcare professional. [place] [address] any warranty or liability for your use of this information.

## 2020-10-19 NOTE — ED TRIAGE NOTES
Triage Note: Patient is coming in for heavy vaginal bleeding since 10/1. Patient had ablation done in 2018 but the whole year she has been having increased bleeding until this month with the heavy bleeding and cramps.

## 2020-10-19 NOTE — TELEPHONE ENCOUNTER
Call received von 620am    28year old patient last seen in the office on 7/1/2020    Patient calling to say that she is changing 4-5 pads in an hour and soaking them and passing clots the size of half dollars. Patient reports cramping at 10 out of 10 on the pain scale of 1-10. Patient was seen in the ER yesterday at [facility]    Patient did not have ultrasound      Patient reports having Procedures: Hysteroscopy, D&C with Myosure 12/2018      Patient had negative upt in ER. Patient advised to seek further evaluation for the increase pain and bleeding    Patient verbalized understanding.     PAOLO

## 2020-10-20 LAB
BACTERIA SPEC CULT: NORMAL
SERVICE CMNT-IMP: NORMAL

## 2020-10-21 ENCOUNTER — OFFICE VISIT (OUTPATIENT)
Dept: OBGYN CLINIC | Age: 35
End: 2020-10-21
Payer: MEDICARE

## 2020-10-21 VITALS
SYSTOLIC BLOOD PRESSURE: 130 MMHG | BODY MASS INDEX: 49.79 KG/M2 | DIASTOLIC BLOOD PRESSURE: 82 MMHG | WEIGHT: 281 LBS | HEIGHT: 63 IN

## 2020-10-21 DIAGNOSIS — N93.9 ABNORMAL UTERINE BLEEDING (AUB): Primary | ICD-10-CM

## 2020-10-21 PROCEDURE — 58100 BIOPSY OF UTERUS LINING: CPT | Performed by: OBSTETRICS & GYNECOLOGY

## 2020-10-21 PROCEDURE — 99214 OFFICE O/P EST MOD 30 MIN: CPT | Performed by: OBSTETRICS & GYNECOLOGY

## 2020-10-21 PROCEDURE — G9231 DOC ESRD DIA TRANS PREG: HCPCS | Performed by: OBSTETRICS & GYNECOLOGY

## 2020-10-21 PROCEDURE — G8417 CALC BMI ABV UP PARAM F/U: HCPCS | Performed by: OBSTETRICS & GYNECOLOGY

## 2020-10-21 PROCEDURE — G8427 DOCREV CUR MEDS BY ELIG CLIN: HCPCS | Performed by: OBSTETRICS & GYNECOLOGY

## 2020-10-21 PROCEDURE — G9717 DOC PT DX DEP/BP F/U NT REQ: HCPCS | Performed by: OBSTETRICS & GYNECOLOGY
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
## 2020-10-22 LAB — TSH SERPL DL<=0.005 MIU/L-ACNC: 1.61 UIU/ML (ref 0.45–4.5)

## 2020-10-26 ENCOUNTER — TELEPHONE (OUTPATIENT)
Dept: OBGYN CLINIC | Age: 35
End: 2020-10-26

## 2020-10-26 NOTE — TELEPHONE ENCOUNTER
Patient called in asking if her pathology results are back. I informed her that per Dr. Blaine Koch patients TSH results are back and everything was completely normal.      I explained to her that the pathology is not back yet and that reesults are taking about 2 to 3 weeks at this time due to the delay because of COVID. Informed her that as soon as the results are back and reviewed by Dr. Blaine Koch we would contact her. Patient requested something for the pain claiming she's tried everything over the counter. When asked if she has taken Motrin she stated that she's taken 2 and its not helping. I explained to the patient that per Dr. Blaine Koch she can take 800mg every 8 hours if needed for the pain but that we are unable to prescribe narcotics for her.

## 2020-10-30 LAB
CPT CODES, 490044: NORMAL
CPT DISCLAIMER: NORMAL
CYTOLOGY SPEC DOC CYTO: NORMAL
DIAGNOSIS SYNOPSIS:: NORMAL
DX ICD CODE: NORMAL
PATH REPORT.GROSS SPEC: NORMAL
PATHOLOGIST NAME: NORMAL
PDF IMAGE, 807507: NORMAL
SPECIMEN SOURCE: NORMAL

## 2020-11-04 ENCOUNTER — TELEPHONE (OUTPATIENT)
Dept: OBGYN CLINIC | Age: 35
End: 2020-11-04

## 2020-11-04 NOTE — TELEPHONE ENCOUNTER
Patient left message on voicemail to call back      She wants to come in to talk with Dr. Sindhu Ramires about possibly scheduling for a hysterectomy. She said she is in excruciating pain all the time and the ER will not help her (they suggest she follow up with Slim Maher since it is a chronic issue)      Would you be okay with me setting her up to come in to discuss next step since she last saw you for AUB on 10/21/20?   She said she \" does not want any other types of treatment she only wants a hysterectomy\"

## 2020-11-05 NOTE — TELEPHONE ENCOUNTER
Patient called back at 4:05PM      28year old patient last seen in the office on 10/21/2020    Patient is wanting to know about her recent lab results and states she is still having the bleeding.       Please advise

## 2020-11-06 NOTE — TELEPHONE ENCOUNTER
Patient advised of MD reviewed lab and recommendations . Patient placed on the scheduled to discuss surgery on 11/10/2020at 10:40am    Patient verbalized understanding.

## 2020-11-10 ENCOUNTER — OFFICE VISIT (OUTPATIENT)
Dept: OBGYN CLINIC | Age: 35
End: 2020-11-10
Payer: MEDICARE

## 2020-11-10 VITALS
HEIGHT: 63 IN | WEIGHT: 281 LBS | DIASTOLIC BLOOD PRESSURE: 86 MMHG | SYSTOLIC BLOOD PRESSURE: 138 MMHG | BODY MASS INDEX: 49.79 KG/M2

## 2020-11-10 DIAGNOSIS — N93.9 ABNORMAL UTERINE BLEEDING (AUB): Primary | ICD-10-CM

## 2020-11-10 PROCEDURE — G9717 DOC PT DX DEP/BP F/U NT REQ: HCPCS | Performed by: OBSTETRICS & GYNECOLOGY

## 2020-11-10 PROCEDURE — G8417 CALC BMI ABV UP PARAM F/U: HCPCS | Performed by: OBSTETRICS & GYNECOLOGY

## 2020-11-10 PROCEDURE — 99213 OFFICE O/P EST LOW 20 MIN: CPT | Performed by: OBSTETRICS & GYNECOLOGY

## 2020-11-10 PROCEDURE — G9231 DOC ESRD DIA TRANS PREG: HCPCS | Performed by: OBSTETRICS & GYNECOLOGY

## 2020-11-10 PROCEDURE — G8427 DOCREV CUR MEDS BY ELIG CLIN: HCPCS | Performed by: OBSTETRICS & GYNECOLOGY

## 2020-11-10 NOTE — PROGRESS NOTES
We discussed options for further mgmt of AUB and chronic pelvic pain. She strongly desires hyst.   Her sister was present. We discussed [place] as alternative, pamphlet provided. We discussed ablation as alternative, pamphlet provided. We discussed liane CHAVEZ  We discussed the risks, benefits, advantages, disadvantages, and alternatives to surgery. She understands that minimally invasive techniques will be attempted, but possible conversion to more invasive techniques may be necessary. She understands and accepts the possible risks and possible complications of planned surgery, including but not limited to infection, bleeding, damage to surrounding structures requiring repair (including but not limited to bowel, bladder, uterus, fallopian tubes, blood vessels, and ovaries) as well as risks to anesthesia. She understands risks of needing additional procedures. She understands risks of postoperative adhesions. She understands risks of postoperative pain. She understands and accepts the risk of surgical complications, including the remote risk of death or serious disability-that exists with any surgical procedure. She understands that surgery does not guarantee alleviation of symptoms. She has reasonable postoperative expectations, expected hospital course, and anticipated recovery time. I have answered all her questions at this time. She will further research her options and let me know what she decides. Signed By: Maurice Cook MD     November 10, 2020      Visit time 15min. More than 50% of my face-to-face time was spend on patient counseling.

## 2020-11-11 ENCOUNTER — TELEPHONE (OUTPATIENT)
Dept: OBGYN CLINIC | Age: 35
End: 2020-11-11

## 2020-11-11 NOTE — TELEPHONE ENCOUNTER
Pt was told to call when she decides what procedure she wants done. She has decided on the UFE. Please call with details.

## 2020-11-12 ENCOUNTER — TELEPHONE (OUTPATIENT)
Dept: OBGYN CLINIC | Age: 35
End: 2020-11-12

## 2020-11-12 NOTE — TELEPHONE ENCOUNTER
Patient has been advised that Veronicaheriberto Denis is out of office. She wants the message to be sent for his return, she has been advised it will not be this week or next. She has decided to proceed with the [place] procedure and she wanted to tell you.

## 2020-11-17 NOTE — TELEPHONE ENCOUNTER
Resending to RL as a HIGH priority message again. I spoke with Michael Calderón as well earlier to advise to ask RL regarding this issue.

## 2020-11-17 NOTE — TELEPHONE ENCOUNTER
Dr. Noy Whitley, I spoke with this patient again. She wanted to know again when Tomás  is coming back. Upon conversation, she wants to move forward with the [place] procedure as noted in Blaine Stacy' notes. She said she is having heavy bleeding still, using \"diapers\" versus a pad. She said she changed and put on a new \"diaper\" at 2 am today and just changed again this afternoon before speaking with me at 12:50. The diaper was soaked. She said she is very embarrassed from all the blood leakage and does not leave her bed. She has pain across entire stomach to her mid back. She said she passes a couple of \"golf ball size clots\" a day. She is tired, lightheaded, and dizzy at times. She said she recently tried to come up her steps and \"passed out\". She knows that permanent fix would be the surgery, but she is really struggling now. She has a lot of personal stuff going on and DECLINES to come in or GO TO ER. Please advise. Is there anything she can try to reduce bleeding? Can I send information to Emily Trammell to get her on the surgery schedule for Dr. Blaine Stacy as soon as possible? Please advise.

## 2020-11-17 NOTE — TELEPHONE ENCOUNTER
Patient has been advised and given [address] [facility] phone number [phone] Dr. Vern Ross    ER if worsens.

## 2020-11-23 NOTE — TELEPHONE ENCOUNTER
Patient was re advised of MD recommendations and provider [facility] radiology contact information was provided for the patient. Patient verbalized understanding.

## 2020-11-23 NOTE — TELEPHONE ENCOUNTER
I am sorry, can you not read the thread and previous messages. We do not do this in our office. She has already been provided with the number and doctor she needs to call.

## 2020-11-23 NOTE — TELEPHONE ENCOUNTER
11/23/20  4:04 pm-  Patient called back very rude to say that we gave her the wrong phone number. She claims that she was given the incorrect phone number. Repeated the phone number once again and something she wrote down was incorrect. This number had been given to her previously as well. Patient was very snappy with her words like we purposively gave her incorrect information.

## 2020-11-23 NOTE — TELEPHONE ENCOUNTER
Call received at 2:57PM      Patient calling back again to be scheduled for her [place] procedure    Please advise how patient to proceed    If needs appointment in the office ,please advise of date and time

## 2020-11-24 ENCOUNTER — TELEPHONE (OUTPATIENT)
Dept: OBGYN CLINIC | Age: 35
End: 2020-11-24

## 2020-11-24 NOTE — TELEPHONE ENCOUNTER
Called and spoke with patient. She was requesting that we send over her medical records to [facility]. I explained to the patient that she would need to sign a release form before we can send them. .  Patient verbalized understanding and will get that taken care of.

## 2020-12-11 ENCOUNTER — TRANSCRIBE ORDER (OUTPATIENT)
Dept: REGISTRATION | Age: 35
End: 2020-12-11

## 2020-12-11 ENCOUNTER — HOSPITAL ENCOUNTER (OUTPATIENT)
Dept: GENERAL RADIOLOGY | Age: 35
Discharge: HOME | End: 2020-12-11
Payer: MEDICARE

## 2020-12-11 DIAGNOSIS — M54.50 LOW BACK PAIN: Primary | ICD-10-CM

## 2020-12-11 DIAGNOSIS — M54.50 LOW BACK PAIN: ICD-10-CM

## 2020-12-11 PROCEDURE — 72100 X-RAY EXAM L-S SPINE 2/3 VWS: CPT

## 2021-02-03 ENCOUNTER — HOSPITAL ENCOUNTER (EMERGENCY)
Age: 36
Discharge: HOME | End: 2021-02-03
Attending: EMERGENCY MEDICINE
Payer: MEDICARE

## 2021-02-03 ENCOUNTER — TELEPHONE (OUTPATIENT)
Dept: OBGYN CLINIC | Age: 36
End: 2021-02-03

## 2021-02-03 VITALS
HEART RATE: 85 BPM | SYSTOLIC BLOOD PRESSURE: 160 MMHG | BODY MASS INDEX: 48.37 KG/M2 | DIASTOLIC BLOOD PRESSURE: 98 MMHG | RESPIRATION RATE: 16 BRPM | OXYGEN SATURATION: 99 % | WEIGHT: 273 LBS | TEMPERATURE: 98.5 F | HEIGHT: 63 IN

## 2021-02-03 DIAGNOSIS — L02.31 ABSCESS OF BUTTOCK, RIGHT: Primary | ICD-10-CM

## 2021-02-03 DIAGNOSIS — I10 HYPERTENSION, UNSPECIFIED TYPE: ICD-10-CM

## 2021-02-03 DIAGNOSIS — E11.9 TYPE 2 DIABETES MELLITUS WITHOUT COMPLICATION, WITHOUT LONG-TERM CURRENT USE OF INSULIN (HCC): ICD-10-CM

## 2021-02-03 PROCEDURE — 99283 EMERGENCY DEPT VISIT LOW MDM: CPT

## 2021-02-03 RX ORDER — CLINDAMYCIN HYDROCHLORIDE 300 MG/1
300 CAPSULE ORAL 4 TIMES DAILY
Qty: 40 CAP | Refills: 0 | Status: SHIPPED | OUTPATIENT
Start: 2021-02-03 | End: 2021-02-13

## 2021-02-03 NOTE — ED NOTES
Pt arrives to the ED AAOX4 with a c/c of abscess on tight buttocks onset two weeks ago, noted draining. Pt is noted hypertensive but states she has not taken her BP meds today. Pt is noted in stable condition, now in ED room with side rail up, bed to lowest position and call light within reach. Will continue to monitor and wait for ED provider evaluation. Emergency Department Nursing Plan of Care       The Nursing Plan of Care is developed from the Nursing assessment and Emergency Department Attending provider initial evaluation. The plan of care may be reviewed in the ED Provider note.     The Plan of Care was developed with the following considerations:   Patient / Family readiness to learn indicated by:verbalized understanding  Persons(s) to be included in education: patient  Barriers to Learning/Limitations:No    Signed     Kaleb Holstein    2/3/2021   4:25 PM

## 2021-02-03 NOTE — TELEPHONE ENCOUNTER
Called and left a message for the patient. Dr. Kathy Ruiz has had an emergency and had to leave for the day. Patients appointment needs to be rescheduled. If patient calls back please reschedule her to next week.
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
## 2021-02-03 NOTE — TELEPHONE ENCOUNTER
Call received at 2:05PM      Patient calling back to ask about the need for rescheduling her appointment and could she see another provider    Patient was advised per CC that Dr Junior Leal was back in the office and she can keep her appointment    Patient verbalized understanding.

## 2021-02-21 ENCOUNTER — APPOINTMENT (OUTPATIENT)
Dept: GENERAL RADIOLOGY | Age: 36
End: 2021-02-21
Attending: EMERGENCY MEDICINE
Payer: MEDICARE

## 2021-02-21 ENCOUNTER — HOSPITAL ENCOUNTER (EMERGENCY)
Age: 36
Discharge: HOME | End: 2021-02-21
Attending: EMERGENCY MEDICINE
Payer: MEDICARE

## 2021-02-21 VITALS
BODY MASS INDEX: 45 KG/M2 | HEIGHT: 63 IN | RESPIRATION RATE: 20 BRPM | SYSTOLIC BLOOD PRESSURE: 146 MMHG | OXYGEN SATURATION: 100 % | DIASTOLIC BLOOD PRESSURE: 83 MMHG | WEIGHT: 254 LBS | TEMPERATURE: 99 F | HEART RATE: 94 BPM

## 2021-02-21 DIAGNOSIS — Z20.822 SUSPECTED COVID-19 VIRUS INFECTION: Primary | ICD-10-CM

## 2021-02-21 DIAGNOSIS — B34.9 VIRAL ILLNESS: ICD-10-CM

## 2021-02-21 PROCEDURE — 71045 X-RAY EXAM CHEST 1 VIEW: CPT

## 2021-02-21 PROCEDURE — 99284 EMERGENCY DEPT VISIT MOD MDM: CPT

## 2021-02-21 PROCEDURE — U0003 INFECTIOUS AGENT DETECTION BY NUCLEIC ACID (DNA OR RNA); SEVERE ACUTE RESPIRATORY SYNDROME CORONAVIRUS 2 (SARS-COV-2) (CORONAVIRUS DISEASE [COVID-19]), AMPLIFIED PROBE TECHNIQUE, MAKING USE OF HIGH THROUGHPUT TECHNOLOGIES AS DESCRIBED BY CMS-2020-01-R: HCPCS

## 2021-02-21 PROCEDURE — 74011250637 HC RX REV CODE- 250/637: Performed by: EMERGENCY MEDICINE

## 2021-02-21 RX ORDER — ACETAMINOPHEN 500 MG
1000 TABLET ORAL ONCE
Status: COMPLETED | OUTPATIENT
Start: 2021-02-21 | End: 2021-02-21

## 2021-02-21 RX ORDER — ONDANSETRON 4 MG/1
4 TABLET, ORALLY DISINTEGRATING ORAL
Status: COMPLETED | OUTPATIENT
Start: 2021-02-21 | End: 2021-02-21

## 2021-02-21 RX ORDER — ONDANSETRON 4 MG/1
4 TABLET, ORALLY DISINTEGRATING ORAL
Qty: 10 TAB | Refills: 0 | Status: SHIPPED | OUTPATIENT
Start: 2021-02-21

## 2021-02-21 RX ADMIN — ONDANSETRON 4 MG: 4 TABLET, ORALLY DISINTEGRATING ORAL at 14:57

## 2021-02-21 RX ADMIN — ACETAMINOPHEN 1000 MG: 500 TABLET ORAL at 14:57
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
## 2021-02-21 NOTE — ED NOTES
Patient here by EMS with c/o sore throat, vomiting, and loss of taste and smell. Patient also c/o diarrhea, fatigue, body aches, and one episode of urinary incontinence. Patient states symptoms for 3-4 days. Patient states hx of smoking and asthma. Patient denies contact with anyone with known illness. Patient denies changes in diet. Emergency Department Nursing Plan of Care       The Nursing Plan of Care is developed from the Nursing assessment and Emergency Department Attending provider initial evaluation. The plan of care may be reviewed in the ED Provider note.     The Plan of Care was developed with the following considerations:   Patient / Family readiness to learn indicated by:verbalized understanding  Persons(s) to be included in education: patient  Barriers to Learning/Limitations:No    Signed     Levon Fox RN    2/21/2021   2:44 PM

## 2021-02-22 ENCOUNTER — PATIENT OUTREACH (OUTPATIENT)
Dept: CASE MANAGEMENT | Age: 36
End: 2021-02-22

## 2021-02-23 NOTE — ED NOTES
Sebastian Faust called from [facility] to let us know she canceled the Covid-19 test we sent, she was unable to get a test result after two attempts with the specimen. Will notify attending and mid-level providers, Lesli Paul and Kati.

## 2021-02-23 NOTE — ED NOTES
RN notified patient that she will need to come in and get retested for COVID as swab was not usable.

## 2021-03-04 ENCOUNTER — OFFICE VISIT (OUTPATIENT)
Dept: NEUROLOGY | Age: 36
End: 2021-03-04
Payer: MEDICARE

## 2021-03-04 VITALS
OXYGEN SATURATION: 98 % | RESPIRATION RATE: 20 BRPM | TEMPERATURE: 97.3 F | HEART RATE: 80 BPM | SYSTOLIC BLOOD PRESSURE: 158 MMHG | DIASTOLIC BLOOD PRESSURE: 100 MMHG

## 2021-03-04 DIAGNOSIS — G44.229 CHRONIC TENSION-TYPE HEADACHE, NOT INTRACTABLE: ICD-10-CM

## 2021-03-04 DIAGNOSIS — F31.9 BIPOLAR 1 DISORDER (HCC): ICD-10-CM

## 2021-03-04 DIAGNOSIS — R42 DIZZINESS: ICD-10-CM

## 2021-03-04 DIAGNOSIS — G40.319 INTRACTABLE GENERALIZED IDIOPATHIC EPILEPSY WITHOUT STATUS EPILEPTICUS (HCC): Primary | ICD-10-CM

## 2021-03-04 PROCEDURE — 99214 OFFICE O/P EST MOD 30 MIN: CPT | Performed by: PSYCHIATRY & NEUROLOGY

## 2021-03-04 PROCEDURE — G8427 DOCREV CUR MEDS BY ELIG CLIN: HCPCS | Performed by: PSYCHIATRY & NEUROLOGY

## 2021-03-04 PROCEDURE — G8417 CALC BMI ABV UP PARAM F/U: HCPCS | Performed by: PSYCHIATRY & NEUROLOGY

## 2021-03-04 PROCEDURE — G9231 DOC ESRD DIA TRANS PREG: HCPCS | Performed by: PSYCHIATRY & NEUROLOGY

## 2021-03-04 PROCEDURE — G9717 DOC PT DX DEP/BP F/U NT REQ: HCPCS | Performed by: PSYCHIATRY & NEUROLOGY
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
## 2021-03-08 ENCOUNTER — HOSPITAL ENCOUNTER (EMERGENCY)
Age: 36
Discharge: HOME | End: 2021-03-08
Attending: EMERGENCY MEDICINE
Payer: MEDICARE

## 2021-03-08 VITALS
OXYGEN SATURATION: 99 % | HEART RATE: 77 BPM | DIASTOLIC BLOOD PRESSURE: 99 MMHG | BODY MASS INDEX: 35.44 KG/M2 | HEIGHT: 63 IN | SYSTOLIC BLOOD PRESSURE: 159 MMHG | WEIGHT: 200 LBS | RESPIRATION RATE: 18 BRPM | TEMPERATURE: 98.5 F

## 2021-03-08 DIAGNOSIS — Z71.1 FEARED COMPLAINT WITHOUT DIAGNOSIS: Primary | ICD-10-CM

## 2021-03-08 PROCEDURE — 99283 EMERGENCY DEPT VISIT LOW MDM: CPT

## 2021-03-08 PROCEDURE — 74011250637 HC RX REV CODE- 250/637: Performed by: PHYSICIAN ASSISTANT

## 2021-03-08 RX ORDER — NAPROXEN 250 MG/1
250 TABLET ORAL ONCE
Status: COMPLETED | OUTPATIENT
Start: 2021-03-08 | End: 2021-03-08

## 2021-03-08 RX ADMIN — NAPROXEN 250 MG: 250 TABLET ORAL at 10:03

## 2021-03-08 NOTE — PROGRESS NOTES
CM opened case for assistance with discharge planning needs. Patient needs a follow up with pcp , Dr Brittney Willard. Patient agrees for CM to call the office and schedule a follow up. 1008  CM called [address], had to leave  with call back number for scheduling an appointment. Will call patient with appt date and time on her cell [phone].     4045  Dr Nakia Potter office called with pcp appt. 3/11 at 10:45AM, updated on AVS and called patient to inform.      Shima Be RN/BAKARI  [phone]

## 2021-03-08 NOTE — ED NOTES
Pt in with broken q-tip in right ear. Pt states it broke off when she was trying to clean her ears this morning. Emergency Department Nursing Plan of Care       The Nursing Plan of Care is developed from the Nursing assessment and Emergency Department Attending provider initial evaluation. The plan of care may be reviewed in the ED Provider note.     The Plan of Care was developed with the following considerations:   Patient / Family readiness to learn indicated by:verbalized understanding  Persons(s) to be included in education: patient  Barriers to Learning/Limitations:No    Signed     Enrique Schultz RN    3/8/2021   9:38 AM
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
## 2021-04-12 ENCOUNTER — TELEPHONE (OUTPATIENT)
Dept: OBGYN CLINIC | Age: 36
End: 2021-04-12

## 2021-04-12 NOTE — TELEPHONE ENCOUNTER
Pt LM on   Re: papers that she needs to fill out to get surgery. States had to cancel last time because paperwork wasn't done.

## 2021-05-15 ENCOUNTER — APPOINTMENT (OUTPATIENT)
Dept: CT IMAGING | Age: 36
End: 2021-05-15
Attending: INTERNAL MEDICINE
Payer: MEDICARE

## 2021-05-15 ENCOUNTER — HOSPITAL ENCOUNTER (OUTPATIENT)
Age: 36
Setting detail: OBSERVATION
Discharge: HOME | End: 2021-05-16
Attending: EMERGENCY MEDICINE | Admitting: INTERNAL MEDICINE
Payer: MEDICARE

## 2021-05-15 ENCOUNTER — APPOINTMENT (OUTPATIENT)
Dept: GENERAL RADIOLOGY | Age: 36
End: 2021-05-15
Attending: EMERGENCY MEDICINE
Payer: MEDICARE

## 2021-05-15 DIAGNOSIS — G40.909 SEIZURE DISORDER (HCC): ICD-10-CM

## 2021-05-15 DIAGNOSIS — R06.02 SOB (SHORTNESS OF BREATH): ICD-10-CM

## 2021-05-15 DIAGNOSIS — R07.9 ACUTE CHEST PAIN: ICD-10-CM

## 2021-05-15 DIAGNOSIS — I16.0 HYPERTENSIVE URGENCY: Primary | ICD-10-CM

## 2021-05-15 LAB
ALBUMIN SERPL-MCNC: 3.2 G/DL (ref 3.5–5)
ALBUMIN/GLOB SERPL: 0.8 {RATIO} (ref 1.1–2.2)
ALP SERPL-CCNC: 58 U/L (ref 45–117)
ALT SERPL-CCNC: 15 U/L (ref 12–78)
ANION GAP SERPL CALC-SCNC: 4 MMOL/L (ref 5–15)
AST SERPL-CCNC: 6 U/L (ref 15–37)
BASOPHILS # BLD: 0 K/UL (ref 0–0.1)
BASOPHILS NFR BLD: 1 % (ref 0–1)
BILIRUB SERPL-MCNC: 0.1 MG/DL (ref 0.2–1)
BNP SERPL-MCNC: 44 PG/ML
BUN SERPL-MCNC: 7 MG/DL (ref 6–20)
BUN/CREAT SERPL: 9 (ref 12–20)
CALCIUM SERPL-MCNC: 8.2 MG/DL (ref 8.5–10.1)
CHLORIDE SERPL-SCNC: 108 MMOL/L (ref 97–108)
CK SERPL-CCNC: 58 U/L (ref 26–192)
CO2 SERPL-SCNC: 27 MMOL/L (ref 21–32)
CREAT SERPL-MCNC: 0.78 MG/DL (ref 0.55–1.02)
DIFFERENTIAL METHOD BLD: ABNORMAL
EOSINOPHIL # BLD: 0.3 K/UL (ref 0–0.4)
EOSINOPHIL NFR BLD: 5 % (ref 0–7)
ERYTHROCYTE [DISTWIDTH] IN BLOOD BY AUTOMATED COUNT: 18.2 % (ref 11.5–14.5)
GLOBULIN SER CALC-MCNC: 4.2 G/DL (ref 2–4)
GLUCOSE SERPL-MCNC: 81 MG/DL (ref 65–100)
HCG UR QL: NEGATIVE
HCT VFR BLD AUTO: 34 % (ref 35–47)
HGB BLD-MCNC: 10.6 G/DL (ref 11.5–16)
IMM GRANULOCYTES # BLD AUTO: 0 K/UL (ref 0–0.04)
IMM GRANULOCYTES NFR BLD AUTO: 0 % (ref 0–0.5)
LYMPHOCYTES # BLD: 2.1 K/UL (ref 0.8–3.5)
LYMPHOCYTES NFR BLD: 45 % (ref 12–49)
MAGNESIUM SERPL-MCNC: 2 MG/DL (ref 1.6–2.4)
MCH RBC QN AUTO: 24.9 PG (ref 26–34)
MCHC RBC AUTO-ENTMCNC: 31.2 G/DL (ref 30–36.5)
MCV RBC AUTO: 79.8 FL (ref 80–99)
MONOCYTES # BLD: 0.5 K/UL (ref 0–1)
MONOCYTES NFR BLD: 10 % (ref 5–13)
NEUTS SEG # BLD: 1.9 K/UL (ref 1.8–8)
NEUTS SEG NFR BLD: 39 % (ref 32–75)
NRBC # BLD: 0 K/UL (ref 0–0.01)
NRBC BLD-RTO: 0 PER 100 WBC
PLATELET # BLD AUTO: 360 K/UL (ref 150–400)
PMV BLD AUTO: 9.3 FL (ref 8.9–12.9)
POTASSIUM SERPL-SCNC: 3.3 MMOL/L (ref 3.5–5.1)
PROT SERPL-MCNC: 7.4 G/DL (ref 6.4–8.2)
RBC # BLD AUTO: 4.26 M/UL (ref 3.8–5.2)
SODIUM SERPL-SCNC: 139 MMOL/L (ref 136–145)
TROPONIN I SERPL-MCNC: <0.05 NG/ML
WBC # BLD AUTO: 4.8 K/UL (ref 3.6–11)

## 2021-05-15 PROCEDURE — 99218 HC RM OBSERVATION: CPT

## 2021-05-15 PROCEDURE — 84484 ASSAY OF TROPONIN QUANT: CPT

## 2021-05-15 PROCEDURE — 81025 URINE PREGNANCY TEST: CPT

## 2021-05-15 PROCEDURE — 36415 COLL VENOUS BLD VENIPUNCTURE: CPT

## 2021-05-15 PROCEDURE — 65660000000 HC RM CCU STEPDOWN

## 2021-05-15 PROCEDURE — 99285 EMERGENCY DEPT VISIT HI MDM: CPT

## 2021-05-15 PROCEDURE — 71046 X-RAY EXAM CHEST 2 VIEWS: CPT

## 2021-05-15 PROCEDURE — 70450 CT HEAD/BRAIN W/O DYE: CPT

## 2021-05-15 PROCEDURE — 96374 THER/PROPH/DIAG INJ IV PUSH: CPT

## 2021-05-15 PROCEDURE — 83735 ASSAY OF MAGNESIUM: CPT

## 2021-05-15 PROCEDURE — 93005 ELECTROCARDIOGRAM TRACING: CPT

## 2021-05-15 PROCEDURE — 80053 COMPREHEN METABOLIC PANEL: CPT

## 2021-05-15 PROCEDURE — 74011250636 HC RX REV CODE- 250/636: Performed by: INTERNAL MEDICINE

## 2021-05-15 PROCEDURE — 83880 ASSAY OF NATRIURETIC PEPTIDE: CPT

## 2021-05-15 PROCEDURE — 74011250636 HC RX REV CODE- 250/636: Performed by: EMERGENCY MEDICINE

## 2021-05-15 PROCEDURE — 85025 COMPLETE CBC W/AUTO DIFF WBC: CPT

## 2021-05-15 PROCEDURE — 74011000250 HC RX REV CODE- 250: Performed by: INTERNAL MEDICINE

## 2021-05-15 PROCEDURE — 94640 AIRWAY INHALATION TREATMENT: CPT

## 2021-05-15 PROCEDURE — 82550 ASSAY OF CK (CPK): CPT

## 2021-05-15 RX ORDER — IPRATROPIUM BROMIDE AND ALBUTEROL SULFATE 2.5; .5 MG/3ML; MG/3ML
3 SOLUTION RESPIRATORY (INHALATION)
Status: DISCONTINUED | OUTPATIENT
Start: 2021-05-15 | End: 2021-05-16 | Stop reason: HOSPADM

## 2021-05-15 RX ORDER — OXCARBAZEPINE 150 MG/1
300 TABLET, FILM COATED ORAL 2 TIMES DAILY
Status: DISCONTINUED | OUTPATIENT
Start: 2021-05-16 | End: 2021-05-16 | Stop reason: HOSPADM

## 2021-05-15 RX ORDER — HYDROCHLOROTHIAZIDE 25 MG/1
12.5 TABLET ORAL DAILY
Status: DISCONTINUED | OUTPATIENT
Start: 2021-05-16 | End: 2021-05-16 | Stop reason: HOSPADM

## 2021-05-15 RX ORDER — LORAZEPAM 2 MG/ML
1 INJECTION INTRAMUSCULAR
Status: DISCONTINUED | OUTPATIENT
Start: 2021-05-15 | End: 2021-05-16 | Stop reason: HOSPADM

## 2021-05-15 RX ORDER — SODIUM CHLORIDE, SODIUM LACTATE, POTASSIUM CHLORIDE, CALCIUM CHLORIDE 600; 310; 30; 20 MG/100ML; MG/100ML; MG/100ML; MG/100ML
75 INJECTION, SOLUTION INTRAVENOUS CONTINUOUS
Status: DISCONTINUED | OUTPATIENT
Start: 2021-05-15 | End: 2021-05-16 | Stop reason: HOSPADM

## 2021-05-15 RX ORDER — ENOXAPARIN SODIUM 100 MG/ML
40 INJECTION SUBCUTANEOUS DAILY
Status: DISCONTINUED | OUTPATIENT
Start: 2021-05-16 | End: 2021-05-16 | Stop reason: HOSPADM

## 2021-05-15 RX ORDER — SODIUM CHLORIDE 0.9 % (FLUSH) 0.9 %
5-40 SYRINGE (ML) INJECTION AS NEEDED
Status: DISCONTINUED | OUTPATIENT
Start: 2021-05-15 | End: 2021-05-16 | Stop reason: HOSPADM

## 2021-05-15 RX ORDER — ONDANSETRON 2 MG/ML
4 INJECTION INTRAMUSCULAR; INTRAVENOUS
Status: DISCONTINUED | OUTPATIENT
Start: 2021-05-15 | End: 2021-05-16 | Stop reason: HOSPADM

## 2021-05-15 RX ORDER — POLYETHYLENE GLYCOL 3350 17 G/17G
17 POWDER, FOR SOLUTION ORAL DAILY PRN
Status: DISCONTINUED | OUTPATIENT
Start: 2021-05-15 | End: 2021-05-16 | Stop reason: HOSPADM

## 2021-05-15 RX ORDER — HYDRALAZINE HYDROCHLORIDE 20 MG/ML
20 INJECTION INTRAMUSCULAR; INTRAVENOUS
Status: COMPLETED | OUTPATIENT
Start: 2021-05-15 | End: 2021-05-15

## 2021-05-15 RX ORDER — HYDRALAZINE HYDROCHLORIDE 20 MG/ML
10 INJECTION INTRAMUSCULAR; INTRAVENOUS
Status: DISCONTINUED | OUTPATIENT
Start: 2021-05-15 | End: 2021-05-16 | Stop reason: HOSPADM

## 2021-05-15 RX ORDER — PROMETHAZINE HYDROCHLORIDE 25 MG/1
12.5 TABLET ORAL
Status: DISCONTINUED | OUTPATIENT
Start: 2021-05-15 | End: 2021-05-16 | Stop reason: HOSPADM

## 2021-05-15 RX ORDER — SODIUM CHLORIDE 0.9 % (FLUSH) 0.9 %
5-40 SYRINGE (ML) INJECTION EVERY 8 HOURS
Status: DISCONTINUED | OUTPATIENT
Start: 2021-05-15 | End: 2021-05-16 | Stop reason: HOSPADM

## 2021-05-15 RX ADMIN — SODIUM CHLORIDE, POTASSIUM CHLORIDE, SODIUM LACTATE AND CALCIUM CHLORIDE 75 ML/HR: 600; 310; 30; 20 INJECTION, SOLUTION INTRAVENOUS at 21:27

## 2021-05-15 RX ADMIN — HYDRALAZINE HYDROCHLORIDE 20 MG: 20 INJECTION INTRAMUSCULAR; INTRAVENOUS at 18:47

## 2021-05-15 RX ADMIN — IPRATROPIUM BROMIDE AND ALBUTEROL SULFATE 3 ML: .5; 3 SOLUTION RESPIRATORY (INHALATION) at 23:42

## 2021-05-15 RX ADMIN — Medication 10 ML: at 23:03
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
## 2021-05-15 NOTE — ED TRIAGE NOTES
Pt arrived via EMS from home c/o seizure lasting about 2-4 minutes. Pt reports she has been off of her seizure meds due to an interaction with her psych meds. Pt had a seizure about 2 weeks ago and today.

## 2021-05-16 VITALS
HEIGHT: 62 IN | DIASTOLIC BLOOD PRESSURE: 86 MMHG | SYSTOLIC BLOOD PRESSURE: 160 MMHG | TEMPERATURE: 98 F | BODY MASS INDEX: 48.53 KG/M2 | HEART RATE: 80 BPM | RESPIRATION RATE: 20 BRPM | WEIGHT: 263.7 LBS | OXYGEN SATURATION: 100 %

## 2021-05-16 LAB
ALBUMIN SERPL-MCNC: 3.4 G/DL (ref 3.5–5)
ALBUMIN/GLOB SERPL: 0.8 {RATIO} (ref 1.1–2.2)
ALP SERPL-CCNC: 59 U/L (ref 45–117)
ALT SERPL-CCNC: 16 U/L (ref 12–78)
AMPHET UR QL SCN: NEGATIVE
ANION GAP SERPL CALC-SCNC: 6 MMOL/L (ref 5–15)
APPEARANCE UR: CLEAR
AST SERPL-CCNC: 9 U/L (ref 15–37)
ATRIAL RATE: 72 BPM
BACTERIA URNS QL MICRO: NEGATIVE /HPF
BARBITURATES UR QL SCN: NEGATIVE
BASOPHILS # BLD: 0 K/UL (ref 0–0.1)
BASOPHILS NFR BLD: 1 % (ref 0–1)
BENZODIAZ UR QL: NEGATIVE
BILIRUB SERPL-MCNC: 0.2 MG/DL (ref 0.2–1)
BILIRUB UR QL: NEGATIVE
BUN SERPL-MCNC: 8 MG/DL (ref 6–20)
BUN/CREAT SERPL: 11 (ref 12–20)
CALCIUM SERPL-MCNC: 8.5 MG/DL (ref 8.5–10.1)
CALCULATED P AXIS, ECG09: 28 DEGREES
CALCULATED R AXIS, ECG10: 1 DEGREES
CALCULATED T AXIS, ECG11: 0 DEGREES
CANNABINOIDS UR QL SCN: POSITIVE
CHLORIDE SERPL-SCNC: 106 MMOL/L (ref 97–108)
CO2 SERPL-SCNC: 25 MMOL/L (ref 21–32)
COCAINE UR QL SCN: POSITIVE
COLOR UR: ABNORMAL
COMMENT, HOLDF: NORMAL
CREAT SERPL-MCNC: 0.7 MG/DL (ref 0.55–1.02)
D DIMER PPP FEU-MCNC: 0.4 MG/L FEU (ref 0–0.65)
DIAGNOSIS, 93000: NORMAL
DIFFERENTIAL METHOD BLD: ABNORMAL
DRUG SCRN COMMENT,DRGCM: ABNORMAL
EOSINOPHIL # BLD: 0.2 K/UL (ref 0–0.4)
EOSINOPHIL NFR BLD: 3 % (ref 0–7)
EPITH CASTS URNS QL MICRO: ABNORMAL /LPF
ERYTHROCYTE [DISTWIDTH] IN BLOOD BY AUTOMATED COUNT: 18.2 % (ref 11.5–14.5)
FERRITIN SERPL-MCNC: 29 NG/ML (ref 26–388)
FOLATE SERPL-MCNC: 6.8 NG/ML (ref 5–21)
GLOBULIN SER CALC-MCNC: 4.1 G/DL (ref 2–4)
GLUCOSE SERPL-MCNC: 103 MG/DL (ref 65–100)
GLUCOSE UR STRIP.AUTO-MCNC: NEGATIVE MG/DL
HCT VFR BLD AUTO: 34.7 % (ref 35–47)
HGB BLD-MCNC: 11.3 G/DL (ref 11.5–16)
HGB UR QL STRIP: NEGATIVE
HYALINE CASTS URNS QL MICRO: ABNORMAL /LPF (ref 0–5)
IMM GRANULOCYTES # BLD AUTO: 0 K/UL (ref 0–0.04)
IMM GRANULOCYTES NFR BLD AUTO: 0 % (ref 0–0.5)
IRON SATN MFR SERPL: 10 % (ref 20–50)
IRON SERPL-MCNC: 34 UG/DL (ref 35–150)
KETONES UR QL STRIP.AUTO: NEGATIVE MG/DL
LEUKOCYTE ESTERASE UR QL STRIP.AUTO: ABNORMAL
LIPASE SERPL-CCNC: 93 U/L (ref 73–393)
LYMPHOCYTES # BLD: 1.9 K/UL (ref 0.8–3.5)
LYMPHOCYTES NFR BLD: 33 % (ref 12–49)
MAGNESIUM SERPL-MCNC: 2 MG/DL (ref 1.6–2.4)
MCH RBC QN AUTO: 25.3 PG (ref 26–34)
MCHC RBC AUTO-ENTMCNC: 32.6 G/DL (ref 30–36.5)
MCV RBC AUTO: 77.8 FL (ref 80–99)
METHADONE UR QL: NEGATIVE
MONOCYTES # BLD: 0.6 K/UL (ref 0–1)
MONOCYTES NFR BLD: 10 % (ref 5–13)
NEUTS SEG # BLD: 3.2 K/UL (ref 1.8–8)
NEUTS SEG NFR BLD: 53 % (ref 32–75)
NITRITE UR QL STRIP.AUTO: NEGATIVE
NRBC # BLD: 0 K/UL (ref 0–0.01)
NRBC BLD-RTO: 0 PER 100 WBC
OPIATES UR QL: NEGATIVE
P-R INTERVAL, ECG05: 160 MS
PCP UR QL: NEGATIVE
PH UR STRIP: 8.5 [PH] (ref 5–8)
PHOSPHATE SERPL-MCNC: 2.8 MG/DL (ref 2.6–4.7)
PLATELET # BLD AUTO: 350 K/UL (ref 150–400)
PMV BLD AUTO: 9.2 FL (ref 8.9–12.9)
POTASSIUM SERPL-SCNC: 3.6 MMOL/L (ref 3.5–5.1)
PROT SERPL-MCNC: 7.5 G/DL (ref 6.4–8.2)
PROT UR STRIP-MCNC: NEGATIVE MG/DL
Q-T INTERVAL, ECG07: 400 MS
QRS DURATION, ECG06: 84 MS
QTC CALCULATION (BEZET), ECG08: 438 MS
RBC # BLD AUTO: 4.46 M/UL (ref 3.8–5.2)
RBC #/AREA URNS HPF: ABNORMAL /HPF (ref 0–5)
SAMPLES BEING HELD,HOLD: NORMAL
SODIUM SERPL-SCNC: 137 MMOL/L (ref 136–145)
SP GR UR REFRACTOMETRY: 1.01 (ref 1–1.03)
TIBC SERPL-MCNC: 356 UG/DL (ref 250–450)
TROPONIN I SERPL-MCNC: <0.05 NG/ML
TSH SERPL DL<=0.05 MIU/L-ACNC: 2.42 UIU/ML (ref 0.36–3.74)
UROBILINOGEN UR QL STRIP.AUTO: 0.2 EU/DL (ref 0.2–1)
VENTRICULAR RATE, ECG03: 72 BPM
VIT B12 SERPL-MCNC: 343 PG/ML (ref 193–986)
WBC # BLD AUTO: 5.9 K/UL (ref 3.6–11)
WBC URNS QL MICRO: ABNORMAL /HPF (ref 0–4)

## 2021-05-16 PROCEDURE — 99218 HC RM OBSERVATION: CPT

## 2021-05-16 PROCEDURE — 84443 ASSAY THYROID STIM HORMONE: CPT

## 2021-05-16 PROCEDURE — 94640 AIRWAY INHALATION TREATMENT: CPT

## 2021-05-16 PROCEDURE — 74011000250 HC RX REV CODE- 250: Performed by: INTERNAL MEDICINE

## 2021-05-16 PROCEDURE — 96372 THER/PROPH/DIAG INJ SC/IM: CPT

## 2021-05-16 PROCEDURE — 74011250637 HC RX REV CODE- 250/637: Performed by: INTERNAL MEDICINE

## 2021-05-16 PROCEDURE — 80053 COMPREHEN METABOLIC PANEL: CPT

## 2021-05-16 PROCEDURE — 82728 ASSAY OF FERRITIN: CPT

## 2021-05-16 PROCEDURE — 84100 ASSAY OF PHOSPHORUS: CPT

## 2021-05-16 PROCEDURE — 74011250637 HC RX REV CODE- 250/637: Performed by: HOSPITALIST

## 2021-05-16 PROCEDURE — 80307 DRUG TEST PRSMV CHEM ANLYZR: CPT

## 2021-05-16 PROCEDURE — 84484 ASSAY OF TROPONIN QUANT: CPT

## 2021-05-16 PROCEDURE — 36415 COLL VENOUS BLD VENIPUNCTURE: CPT

## 2021-05-16 PROCEDURE — 81001 URINALYSIS AUTO W/SCOPE: CPT

## 2021-05-16 PROCEDURE — 85379 FIBRIN DEGRADATION QUANT: CPT

## 2021-05-16 PROCEDURE — 83735 ASSAY OF MAGNESIUM: CPT

## 2021-05-16 PROCEDURE — 83690 ASSAY OF LIPASE: CPT

## 2021-05-16 PROCEDURE — 74011250636 HC RX REV CODE- 250/636: Performed by: INTERNAL MEDICINE

## 2021-05-16 PROCEDURE — 99222 1ST HOSP IP/OBS MODERATE 55: CPT | Performed by: PSYCHIATRY & NEUROLOGY

## 2021-05-16 PROCEDURE — 99220 PR INITIAL OBSERVATION CARE/DAY 70 MINUTES: CPT | Performed by: SPECIALIST

## 2021-05-16 PROCEDURE — 83550 IRON BINDING TEST: CPT

## 2021-05-16 PROCEDURE — 82607 VITAMIN B-12: CPT

## 2021-05-16 PROCEDURE — 85025 COMPLETE CBC W/AUTO DIFF WBC: CPT

## 2021-05-16 PROCEDURE — 82746 ASSAY OF FOLIC ACID SERUM: CPT

## 2021-05-16 RX ORDER — ACETAMINOPHEN 325 MG/1
650 TABLET ORAL
Status: DISCONTINUED | OUTPATIENT
Start: 2021-05-16 | End: 2021-05-16 | Stop reason: HOSPADM

## 2021-05-16 RX ADMIN — OXCARBAZEPINE 300 MG: 150 TABLET, FILM COATED ORAL at 09:22

## 2021-05-16 RX ADMIN — Medication 10 ML: at 06:25

## 2021-05-16 RX ADMIN — HYDROCHLOROTHIAZIDE 12.5 MG: 25 TABLET ORAL at 09:22

## 2021-05-16 RX ADMIN — ACETAMINOPHEN 650 MG: 325 TABLET ORAL at 14:24

## 2021-05-16 RX ADMIN — Medication 10 ML: at 14:25

## 2021-05-16 RX ADMIN — ENOXAPARIN SODIUM 40 MG: 40 INJECTION SUBCUTANEOUS at 09:22

## 2021-05-16 RX ADMIN — IPRATROPIUM BROMIDE AND ALBUTEROL SULFATE 3 ML: .5; 3 SOLUTION RESPIRATORY (INHALATION) at 05:19

## 2021-05-16 RX ADMIN — LISINOPRIL 30 MG: 20 TABLET ORAL at 09:22

## 2021-05-16 RX ADMIN — IPRATROPIUM BROMIDE AND ALBUTEROL SULFATE 3 ML: .5; 3 SOLUTION RESPIRATORY (INHALATION) at 15:48

## 2021-05-16 NOTE — DISCHARGE SUMMARY
Discharge Summary       PATIENT ID: Cruz Coleman  MRN: [id number]   YOB: 1985    DATE OF ADMISSION: 5/15/2021  4:21 PM    DATE OF DISCHARGE: 5/16/2021   PRIMARY CARE PROVIDER: Juni Ortiz MD     ATTENDING PHYSICIAN: Dr William Celaya  DISCHARGING PROVIDER: William Celaya MD    To contact this individual call [phone] and ask the  to page. If unavailable ask to be transferred the Adult Hospitalist Department. CONSULTATIONS: IP CONSULT TO NEUROLOGY  IP CONSULT TO CARDIOLOGY    PROCEDURES/SURGERIES: * No surgery found *    ADMITTING DIAGNOSES & HOSPITAL COURSE:   Breakthrough seizure  -reportedly non compliant  -now back on OXC, wants to get discharged with outpatient follow up with Dr Eligio Watkins  -She may benefit from EMU monitoring due to h/o PNES to determine if breakthrough episodes are non-epileptic spells vs epileptic events  -Outpatient follow up in 3-4 weeks     Malignant hypertensive urgency  -UDS positive of cocaine  -Counseled the patient     Chest pain  -pleuritic  -Cardiology signed off     COPD:  We will place the patient on DuoNeb as needed  Tobacco abuse:  Nicoderm patch. Counseled  Bipolar disorder:  on home meds  Suspected obstructive sleep apnea:  Recommended for sleep study as outpatient. Pt uderstands  THC positive: counseled  Morbid obesity:  Diet and exercise advised. Hypokalemia:replaced  Anemia: outpatient follow up           DISCHARGE DIAGNOSES / PLAN:      1.   Breakthrough seizure     ADDITIONAL CARE RECOMMENDATIONS:   Follow up with PMD  Follow up with Neurology     PENDING TEST RESULTS:   At the time of discharge the following test results are still pending: none    FOLLOW UP APPOINTMENTS:    Follow-up Information     Follow up With Specialties Details Why Contact Info    Juni Ortiz MD Family Medicine In 1 week  [address] [facility] [address]      Tita Doran MD Neurology In 3 weeks  [address] [facility] [address]  [phone]               DIET: Cardiac Diet    ACTIVITY: Activity as tolerated      DISCHARGE MEDICATIONS:  Current Discharge Medication List      CONTINUE these medications which have NOT CHANGED    Details   ondansetron (Zofran ODT) 4 mg disintegrating tablet Take 1 Tab by mouth every eight (8) hours as needed for Nausea. Qty: 10 Tab, Refills: 0      OXcarbazepine (TRILEPTAL) 300 mg tablet TAKE 1 TABLET BY MOUTH TWICE DAILY  Qty: 60 Tab, Refills: 11      albuterol (PROVENTIL VENTOLIN) 2.5 mg /3 mL (0.083 %) nebu 3 mL by Nebulization route every four (4) hours as needed for Wheezing. Qty: 30 Nebule, Refills: 0      albuterol (PROVENTIL HFA, VENTOLIN HFA, PROAIR HFA) 90 mcg/actuation inhaler Take 2 Puffs by inhalation every four (4) hours as needed for Wheezing. Qty: 1 Inhaler, Refills: 0      acyclovir (ZOVIRAX) 400 mg tablet TAKE 2 TABLETS BY MOUTH THREE TIMES DAILY AS NEEDED FOR OTHER (HSV OUTBREAK) FOR UP TO 5 DAYS  Qty: 30 Tab, Refills: 11      hydroCHLOROthiazide (HYDRODIURIL) 12.5 mg tablet Take 1 Tab by mouth daily. Qty: 30 Tab, Refills: 1      lisinopril (PRINIVIL, ZESTRIL) 30 mg tablet Take 1 Tab by mouth daily. Qty: 30 Tab, Refills: 3      loratadine 10 mg cap Take 10 mg by mouth daily. fluticasone-salmeterol (ADVAIR) 100-50 mcg/dose diskus inhaler Take 1 puff by inhalation every twelve (12) hours. NOTIFY YOUR PHYSICIAN FOR ANY OF THE FOLLOWING:   Fever over 101 degrees for 24 hours. Chest pain, shortness of breath, fever, chills, nausea, vomiting, diarrhea, change in mentation, falling, weakness, bleeding. Severe pain or pain not relieved by medications. Or, any other signs or symptoms that you may have questions about.     DISPOSITION:  x  Home With:   OT  PT  HH  RN       Long term SNF/Inpatient Rehab    Independent/assisted living    Hospice    Other:       PATIENT CONDITION AT DISCHARGE:     Functional status    Poor     Deconditioned    x Independent      Cognition    x Lucid     Forgetful     Dementia      Catheters/lines (plus indication)    Kwan     PICC     PEG    x None      Code status   x  Full code     DNR      PHYSICAL EXAMINATION AT DISCHARGE:  Please see progress note      CHRONIC MEDICAL DIAGNOSES:  Problem List as of 2021 Date Reviewed: 5/15/2021          Codes Class Noted - Resolved    * (Principal) Seizure disorder ([facility] ICD-10-CM: U77.638  ICD-9-CM: 345.90  5/15/2021 - Present        Obesity, morbid ([facility] ICD-10-CM: E66.01  ICD-9-CM: 278.01  2018 - Present        Drug overdose ICD-10-CM: T50.901A  ICD-9-CM: 977.9, E980.5  2015 - Present        Chronic hypokalemia ICD-10-CM: E87.6  ICD-9-CM: 276.8  2014 - Present        UTI (lower urinary tract infection) ICD-10-CM: N39.0  ICD-9-CM: 599.0  3/14/2014 - Present        STD (female) ICD-10-CM: A64  ICD-9-CM: 099.9  3/14/2014 - Present        Other and unspecified noninfectious gastroenteritis and colitis(558.9) ICD-10-CM: K52.9  ICD-9-CM: 558.9  3/14/2014 - Present        History of DVT (deep vein thrombosis) ICD-10-CM: Z86.718  ICD-9-CM: V12.51  3/14/2014 - Present        Hepatitis ICD-10-CM: K75.9  ICD-9-CM: 573.3  3/14/2014 - Present        Bipolar 1 disorder ([facility] ICD-10-CM: F31.9  ICD-9-CM: 296.7  2012 - Present        H/O:  ICD-10-CM: Z98.891  ICD-9-CM: V45.89  2012 - Present    Overview Signed 2012  0:53 PM by Rich Mejia MD     DESIRES RLTCS W BTL at [facility]             HSV-2 infection complicating pregnancy LILLIANA-76-AU: O98.519, B00.9  ICD-9-CM: 647.60, 054.9  2012 - Present    Overview Signed 2012  0:88 PM by Rich Mejia MD     Hx HSV; currently Asx             Upper respiratory tract infection ICD-10-CM: J06.9  ICD-9-CM: 465.9  2012 - Present        Pyelonephritis complicating pregnancy SDU-73-YG: O23.00  ICD-9-CM: 646.60, 590.80  2012 - Present        Gestational diabetes ICD-10-CM: O24.419  ICD-9-CM: 648.80 4/3/2012 - Present        Essential hypertension ICD-10-CM: I10  ICD-9-CM: 401.9  4/3/2012 - Present        Abdominal pain complicating pregnancy STD-64-UI: O26.899, R10.9  ICD-9-CM: 646.80, 789.00  4/3/2012 - Present        Obesity complicating pregnancy MDA-31-WG: O99.210  ICD-9-CM: 649.10  4/3/2012 - Present        Diabetes mellitus ([facility] [address] [address] ICD-10-CM: E11.9  ICD-9-CM: 250.00  8/21/2010 - Present        RESOLVED: Syncope ICD-10-CM: R55  ICD-9-CM: 780.2  1/4/2015 - 1/5/2015              Greater than 39 minutes were spent with the patient on counseling and coordination of care    Signed:   Willie Canales MD  5/16/2021  3:43 PM   .
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
## 2021-05-16 NOTE — PROGRESS NOTES
[address] [facility]  Hospitalist Group                                                                                          Hospitalist Progress Note  Ezequiel Cho MD  Answering service: [phone] from in house phone        Date of Service:  2021  NAME:  Yari Castellanos  :  1985  MRN:  [id number]      Admission Summary: This is a 54-year-old woman with past medical history significant for seizure disorder, hypertension, COPD, bipolar disorder, and suspected obstructive sleep apnea, who was in her usual state of health until the day of her presentation at the emergency room when it was reported that the patient developed seizure-like activity at home. The patient was brought to the emergency room for further evaluation but when the patient arrived at the emergency room, she denies seizure activity instead the patient complained of chest pain and shortness of breath. According to report, the patient has been off her medication for seizure because of the interaction with her psych medication. Also according to report, the patient had another seizure 2 weeks ago. The patient's chest pain is located at the right side of the chest, described as pleuritic with radiation to the back, associated with shortness of breath. The shortness of breath is with mild exertion. No diaphoresis. No sick contact or contact with any person with COVID-19 virus infection. When the patient arrived at the emergency room, she was found to have markedly elevated blood pressure. The patient was referred to the hospitalist service for evaluation for admission. She was last admitted to the hospital from 2015 to 2015. The patient was admitted and treated for drug overdose.     Interval history / Subjective:     F/u Breakthrough seizure  Feels better  Wants to get discharged     Assessment & Plan:     Breakthrough seizure  -reportedly non compliant  -now back on OXC, wants to get discharged with outpatient follow up with Dr Nicole Damon  -She may benefit from EMU monitoring due to h/o PNES to determine if breakthrough episodes are non-epileptic spells vs epileptic events  -Outpatient follow up in 3-4 weeks    Malignant hypertensive urgency  -UDS positive of cocaine  -Counseled the patient    Chest pain  -pleuritic  -Cardiology signed off    COPD:  We will place the patient on DuoNeb as needed  Tobacco abuse:  Nicoderm patch. Counseled  Bipolar disorder:  on home meds  Suspected obstructive sleep apnea:  Recommended for sleep study as outpatient. Pt uderstands  THC positive: counseled  Morbid obesity:  Diet and exercise advised. Hypokalemia:replaced  Anemia: outpatient follow up    Cardiac diet     Code status: FULL CODE  DVT prophylaxis: scd    Plan: Cleared by Cardiology, neurology, will discharge. Outpatient follow up    Care Plan discussed with: Patient/Family  Anticipated Disposition: Home w/Family  Anticipated Discharge: Less than 24 hours     Hospital Problems  Date Reviewed: 5/15/2021          Codes Class Noted POA    * (Principal) [facility] ICD-10-CM: G40.909  ICD-9-CM: 345.90  5/15/2021 Yes                Review of Systems:   A comprehensive review of systems was negative except for that written in the HPI. Vital Signs:    Last 24hrs VS reviewed since prior progress note.  Most recent are:  Visit Vitals  BP (!) 160/86   Pulse 80   Temp 98 °F (36.7 °C)   Resp 20   Ht 5' 2\" (1.575 m)   Wt 119.6 kg (263 lb 11.2 oz)   SpO2 100%   Breastfeeding No   BMI 48.23 kg/m²         Intake/Output Summary (Last 24 hours) at 5/16/2021 1533  Last data filed at 5/16/2021 0400  Gross per 24 hour   Intake 491.25 ml   Output --   Net 491.25 ml        Physical Examination:     I had a face to face encounter with this patient and independently examined them on 5/16/2021 as outlined below:          Constitutional:  No acute distress, cooperative, pleasant    ENT:  Oral mucosa moist, oropharynx benign. Resp:  CTA bilaterally. No wheezing/rhonchi/rales. No accessory muscle use   CV:  Regular rhythm, normal rate, no murmurs, gallops, rubs    GI:  Soft, non distended, non tender. normoactive bowel sounds, no hepatosplenomegaly     Musculoskeletal:  No edema, warm, 2+ pulses throughout    Neurologic:  Moves all extremities. AAOx3, CN II-XII reviewed            Data Review:    Review and/or order of clinical lab test      Labs:     Recent Labs     05/16/21  0055 05/15/21  1729   WBC 5.9 4.8   HGB 11.3* 10.6*   HCT 34.7* 34.0*    360     Recent Labs     05/16/21  0055 05/15/21  1729    139   K 3.6 3.3*    108   CO2 25 27   BUN 8 7   CREA 0.70 0.78   * 81   CA 8.5 8.2*   MG 2.0 2.0   PHOS 2.8  --      Recent Labs     05/16/21  0055 05/15/21  1729   ALT 16 15   AP 59 58   TBILI 0.2 0.1*   TP 7.5 7.4   ALB 3.4* 3.2*   GLOB 4.1* 4.2*   LPSE 93  --      No results for input(s): INR, PTP, APTT, INREXT in the last 72 hours. Recent Labs     05/16/21 0055   TIBC 356   PSAT 10*   FERR 29      Lab Results   Component Value Date/Time    Folate 6.8 05/16/2021 12:55 AM      No results for input(s): PH, PCO2, PO2 in the last 72 hours.   Recent Labs     05/16/21  0055 05/15/21  1729   CPK  --  62   TROIQ <0.05 <0.05     Lab Results   Component Value Date/Time    Cholesterol, total 124 01/26/2015 01:10 AM    HDL Cholesterol 41 01/26/2015 01:10 AM    LDL, calculated 68.2 01/26/2015 01:10 AM    Triglyceride 74 01/26/2015 01:10 AM    CHOL/HDL Ratio 3.0 01/26/2015 01:10 AM     Lab Results   Component Value Date/Time    Glucose (POC) 107 (H) 06/26/2020 11:58 AM    Glucose (POC) 100 12/10/2018 02:55 PM    Glucose (POC) 106 (H) 12/03/2018 04:58 PM    Glucose (POC) 158 (H) 07/27/2016 08:35 PM    Glucose (POC) 149 (H) 07/27/2016 08:01 PM    Glucose (POC) 115 (H) 06/17/2016 10:04 PM    Glucose (POC) 90 09/10/2015 06:35 PM    Glucose (POC) 94 09/10/2015 05:48 PM    Glucose (POC) 125 (H) 06/27/2015 11:30 PM Lab Results   Component Value Date/Time    Color YELLOW/STRAW 05/16/2021 12:09 AM    Appearance CLEAR 05/16/2021 12:09 AM    Specific gravity 1.011 05/16/2021 12:09 AM    Specific gravity >1.030 06/26/2020 11:50 AM    pH (UA) 8.5 (H) 05/16/2021 12:09 AM    Protein Negative 05/16/2021 12:09 AM    Glucose Negative 05/16/2021 12:09 AM    Ketone Negative 05/16/2021 12:09 AM    Bilirubin Negative 05/16/2021 12:09 AM    Urobilinogen 0.2 05/16/2021 12:09 AM    Nitrites Negative 05/16/2021 12:09 AM    Leukocyte Esterase SMALL (A) 05/16/2021 12:09 AM    Epithelial cells FEW 05/16/2021 12:09 AM    Bacteria Negative 05/16/2021 12:09 AM    WBC 10-20 05/16/2021 12:09 AM    RBC 0-5 05/16/2021 12:09 AM         Medications Reviewed:     Current Facility-Administered Medications   Medication Dose Route Frequency    acetaminophen (TYLENOL) tablet 650 mg  650 mg Oral Q4H PRN    albuterol-ipratropium (DUO-NEB) 2.5 MG-0.5 MG/3 ML  3 mL Nebulization Q4H PRN    hydroCHLOROthiazide (HYDRODIURIL) tablet 12.5 mg  12.5 mg Oral DAILY    lisinopriL (PRINIVIL, ZESTRIL) tablet 30 mg  30 mg Oral DAILY    OXcarbazepine (TRILEPTAL) tablet 300 mg  300 mg Oral BID    sodium chloride (NS) flush 5-40 mL  5-40 mL IntraVENous Q8H    sodium chloride (NS) flush 5-40 mL  5-40 mL IntraVENous PRN    polyethylene glycol (MIRALAX) packet 17 g  17 g Oral DAILY PRN    promethazine (PHENERGAN) tablet 12.5 mg  12.5 mg Oral Q6H PRN    Or    ondansetron (ZOFRAN) injection 4 mg  4 mg IntraVENous Q6H PRN    enoxaparin (LOVENOX) injection 40 mg  40 mg SubCUTAneous DAILY    LORazepam (ATIVAN) injection 1 mg  1 mg IntraVENous Q4H PRN    hydrALAZINE (APRESOLINE) 20 mg/mL injection 10 mg  10 mg IntraVENous Q6H PRN    lactated Ringers infusion  75 mL/hr IntraVENous CONTINUOUS     ______________________________________________________________________  EXPECTED LENGTH OF STAY: - - -  ACTUAL LENGTH OF STAY:          1                 Maia Almazan MD

## 2021-05-16 NOTE — PROGRESS NOTES
Bedside and Verbal shift change report given Madelyn (oncoming nurse) by Meir Pinon (offgoing nurse). Report included the following information SBAR, Kardex, ED Summary, Procedure Summary, Intake/Output, MAR, Recent Results, Cardiac Rhythm NSR, Quality Measures and Dual Neuro Assessment.

## 2021-05-16 NOTE — ROUTINE PROCESS
.. TRANSFER - OUT REPORT:    Verbal report given to Jose M Kramer RN(name) on Eligio Bauer  being transferred to [facility](unit) for routine progression of care       Report consisted of patients Situation, Background, Assessment and   Recommendations(SBAR). Information from the following report(s) SBAR, ED Summary, [facility] and Recent Results was reviewed with the receiving nurse. Lines:   Peripheral IV 05/15/21 Left Antecubital (Active)   Site Assessment Clean, dry, & intact 05/15/21 1630   Phlebitis Assessment 0 05/15/21 1630   Infiltration Assessment 0 05/15/21 1630   Dressing Status Clean, dry, & intact 05/15/21 1630        Opportunity for questions and clarification was provided.       Patient transported with:   [employer]

## 2021-05-16 NOTE — PROGRESS NOTES
CM received call asking to set up a Lyft for pt to be discharged home. CM requested a 5pm  time at the front main entrance.  And requested that they text/call pt's mobile phone upon arrival.  [place], [place], [facility]
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
## 2021-05-16 NOTE — H&P
[address] [facility]  HISTORY AND PHYSICAL    Name:  Corinne Tang  MR#:  [id number]  :  1985  ACCOUNT #:  [de-identified]  ADMIT DATE:  05/15/2021      The patient was seen, evaluated and admitted by me on 05/15/2021. PRIMARY CARE PHYSICIAN:  Paolo Gutierrez MD    SOURCE OF INFORMATION:  The patient and review of ED and old electronic medical records. CHIEF COMPLAINT:  Seizure. HISTORY OF PRESENT ILLNESS:  This is a 49-year-old woman with past medical history significant for seizure disorder, hypertension, COPD, bipolar disorder, and suspected obstructive sleep apnea, who was in her usual state of health until the day of her presentation at the emergency room when it was reported that the patient developed seizure-like activity at home. The patient was brought to the emergency room for further evaluation but when the patient arrived at the emergency room, she denies seizure activity instead the patient complained of chest pain and shortness of breath. According to report, the patient has been off her medication for seizure because of the interaction with her psych medication. Also according to report, the patient had another seizure 2 weeks ago. The patient's chest pain is located at the right side of the chest, described as pleuritic with radiation to the back, associated with shortness of breath. The shortness of breath is with mild exertion. No diaphoresis. No sick contact or contact with any person with COVID-19 virus infection. When the patient arrived at the emergency room, she was found to have markedly elevated blood pressure. The patient was referred to the hospitalist service for evaluation for admission. She was last admitted to the hospital from 2015 to 2015. The patient was admitted and treated for drug overdose. PAST MEDICAL HISTORY:  1. Hypertension. 2.  Seizure disorder. 3.  COPD. 4.  Bipolar disorder.   5.  Suspected obstructive sleep apnea. ALLERGIES:  THE PATIENT IS ALLERGIC TO ASPIRIN, VICODIN, FLEXERIL, IBUPROFEN, TORADOL. MEDICATIONS:  1. Acyclovir 400 mg 2 tablets 3 times daily as needed. 2.  Albuterol 90 mcg 2 puffs by inhalation every 4 hours as needed for wheezing. 3.  Advair 100/50 inhalation every 12 hours. 4.  Hydrochlorothiazide 12.5 mg daily. 5.  Lisinopril 30 mg daily. 6.  Trileptal 300 mg twice daily. FAMILY HISTORY:  This was reviewed. Her mother has heart disease, hypertension, and diabetes. Her father had hypertension. PAST SURGICAL HISTORY:  This is significant for:  1.  section. 2.  Cholecystectomy. 3.  Tubal ligation. SOCIAL HISTORY:  The patient smokes about a pack of cigarettes daily. Denies tobacco abuse. REVIEW OF SYSTEMS:  HEAD, EYES, EARS, NOSE, AND THROAT:  No headache, no dizziness, no blurring of vision, no photophobia. RESPIRATORY SYSTEM:  This is positive for shortness of breath. No cough, no hemoptysis. CARDIOVASCULAR SYSTEM:  This is positive for chest pain. No orthopnea, no palpitations. GASTROINTESTINAL SYSTEM:  No nausea or vomiting. No diarrhea, no constipation. GENITOURINARY SYSTEM:  No dysuria, no urgency, no frequency. All other systems are reviewed and they are negative. PHYSICAL EXAMINATION:  GENERAL APPEARANCE:  The patient appeared ill, in moderate distress. VITAL SIGNS:  On arrival at the emergency room; temperature 98.3, pulse 76, respiratory rate 18, blood pressure 183/109, oxygen saturation 99% on room air. HEENT:  Head:  Normocephalic, atraumatic. Eyes:  Normal eye movement. No redness, no drainage, no discharge. Ears:  Normal external ears with no evidence of drainage. Nose:  No deformity, no drainage. Mouth and Throat:  No visible oral lesion. NECK:  Neck is supple. No JVD, no thyromegaly. CHEST:  A few expiratory wheezing. No crackles. HEART:  Normal S1 and S2, regular. No clinically appreciable murmur.   ABDOMEN:  Soft, obese, nontender. Normal bowel sounds. CNS:  Alert and oriented x3. No gross focal neurological deficit. EXTREMITIES:  No edema. Pulses 2+ bilaterally. MUSCULOSKELETAL SYSTEM:  No obvious joint deformity or swelling. SKIN:  No active skin lesions seen in the exposed part of the body. PSYCHIATRY:  Normal mood and affect. LYMPHATIC SYSTEM:  No cervical lymphadenopathy. DIAGNOSTIC DATA:  EKG shows normal sinus rhythm, no significant ST or T-waves abnormalities. Chest x-ray, negative. CT scan of the head without contrast, negative. LABORATORY DATA:  Hematology:  WBC 4.8, hemoglobin 10.6, hematocrit 34.0, platelet 626. Chemistry:  Sodium 139, potassium 3.3, chloride 108, CO2 of 27, glucose 81, BUN 7, creatinine 0.78, calcium 8.2, total bilirubin 0.1, ALT 15, AST 6, alkaline phosphatase 58, total protein 7.4, albumin level 3.2, globulin 4.2. Magnesium level 2.0. Cardiac profile:  Troponin less than 0.05, pro-BNP level 44. Urinalysis: This is significant for negative bacteria, small leukocyte esterase, negative nitrites. ASSESSMENT:  1.  Seizure disorder. 2.  Malignant hypertensive urgency. 3.  Chest pain. 4.  Chronic obstructive pulmonary disease. 5.  Tobacco abuse. 6.  Bipolar disorder. 7.  Suspected obstructive sleep apnea. 8.  Morbid obesity. 9.  Hypokalemia. 10.  Anemia. PLAN:  1. Seizure disorder: We will admit the patient for further evaluation and treatment. We will place the patient on Ativan as needed. Neurology consult will be requested to assist in further evaluation and treatment. We will check urine drug screen. 2.  Malignant hypertensive urgency: We will resume preadmission medication. The patient will also be started on hydralazine as needed for better blood pressure control. CT scan of the head is negative for acute pathology. We will check cardiac markers. We will check urine drug screen as stated above. We will also check a TSH level.   3.  Chest pain:  This is atypical chest pain. We will check lipase level. We will check a D-dimer to evaluate the patient for atypical causes of her chest pain. We will also check urine drug screen. We will monitor the patient closely. Cardiology consult will also be requested to assist in further evaluation and treatment. 4.  COPD:  We will place the patient on DuoNeb as needed  5. Tobacco abuse: The patient advised to quit smoking. She does not want to be placed on Nicoderm patch. 6.  Bipolar disorder: We will resume preadmission medication. 7.  Suspected obstructive sleep apnea: The patient advised to continue to follow with her provider for definitive diagnosis of obstructive sleep apnea. 8.  Morbid obesity:  Diet and exercise advised. Consider inpatient dietary consult if indicated. 9.  Hypokalemia: We will replace potassium and repeat potassium level. We will also check magnesium level. 10.  Anemia: We will carry out anemia workup including checking a stool guaiac to rule out to occult GI bleed. OTHER ISSUES:  Code status: The patient is a full code. We will place the patient on Lovenox for DVT prophylaxis.         Regina Bland MD      [id number]  D:  05/16/2021 2:06  T:  05/16/2021 3:03  JOB #:  [id number]  CC:  Sweta Murry MD

## 2021-05-16 NOTE — PROGRESS NOTES
TRANSFER - IN REPORT:    Verbal report received from Francine(name) on Laura Can Speck  being received from ED(unit) for routine progression of care      Report consisted of patients Situation, Background, Assessment and   Recommendations(SBAR). Information from the following report(s) SBAR, Kardex, ED Summary, Procedure Summary, Intake/Output, MAR, Recent Results, Cardiac Rhythm NSR, Quality Measures and Dual Neuro Assessment was reviewed with the receiving nurse. Opportunity for questions and clarification was provided. Assessment completed upon patients arrival to unit and care assumed.

## 2021-05-16 NOTE — ACP (ADVANCE CARE PLANNING)
visit for Advance Medical Directive (AMD) consult. Pt had previously had her girl friend (who she is no longer with) on her AMD. Pt updated AMD , naming her mom Stefany as primary and father, Kaz Hunt as her secondary. Pt decided she did not want to complete Section II: and will leave those decisions to her parents.  put a paper copy in chart to be scanned and returned the original and copies to pt. Please contact [phone] [facility] for further support.      [address] Milad Welsh, MACE   [phone]

## 2021-05-16 NOTE — PROGRESS NOTES
Problem: Falls - Risk of  Goal: *Absence of Falls  Description: Document Amber Man Fall Risk and appropriate interventions in the flowsheet.   Outcome: Progressing Towards Goal  Note: Fall Risk Interventions:            Medication Interventions: Bed/chair exit alarm, Patient to call before getting OOB    Elimination Interventions: Call light in reach, Toileting schedule/hourly rounds

## 2021-05-16 NOTE — DISCHARGE INSTRUCTIONS
Discharge Instructions       PATIENT ID: Stacia Rice  MRN: [id number]   YOB: 1985    DATE OF ADMISSION: 5/15/2021  4:21 PM    DATE OF DISCHARGE: 5/16/2021    PRIMARY CARE PROVIDER: Iveth Gordon MD     ATTENDING PHYSICIAN: Michael Moreira MD  DISCHARGING PROVIDER: Nabil Limon MD    To contact this individual call [phone] and ask the  to page. If unavailable ask to be transferred the Adult Hospitalist Department. DISCHARGE DIAGNOSES   Breakthough seizures    CONSULTATIONS: IP CONSULT TO NEUROLOGY  IP CONSULT TO CARDIOLOGY    PROCEDURES/SURGERIES: * No surgery found *    PENDING TEST RESULTS:   At the time of discharge the following test results are still pending: None    FOLLOW UP APPOINTMENTS:   Follow-up Information     Follow up With Specialties Details Why Contact Info    Iveth Gordon MD Family Medicine In 1 week  [address] [address] [facility] [address]      Vu Pelletier MD Neurology In 3 weeks  [address] [facility]  [address]  [facility]  [phone]             ADDITIONAL CARE RECOMMENDATIONS:   Follow up with Neurology  Follow up with PMD    DIET: Cardiac Diet    ACTIVITY: Activity as tolerated      DISCHARGE MEDICATIONS:   See Medication Reconciliation Form    · It is important that you take the medication exactly as they are prescribed. · Keep your medication in the bottles provided by the pharmacist and keep a list of the medication names, dosages, and times to be taken in your wallet. · Do not take other medications without consulting your doctor. NOTIFY YOUR PHYSICIAN FOR ANY OF THE FOLLOWING:   Fever over 101 degrees for 24 hours. Chest pain, shortness of breath, fever, chills, nausea, vomiting, diarrhea, change in mentation, falling, weakness, bleeding. Severe pain or pain not relieved by medications. Or, any other signs or symptoms that you may have questions about.       DISPOSITION:  x  Home With:   OT PT  [facility]  RN       SNF/Inpatient Rehab/LTAC    Independent/assisted living    Hospice    Other:     CDMP Checked:   Yes x     PROBLEM LIST Updated:  Yes x       Signed:   Merline Berg, MD  5/16/2021  3:42 PM

## 2021-05-16 NOTE — PROGRESS NOTES
Spiritual Care Assessment/Progress Note  [facility]      NAME: Thang Go      MRN: [id number]  AGE: 39 y.o.  SEX: female  Church Affiliation: Synagogue   Language: English     5/16/2021     Total Time (in minutes): 37     Spiritual Assessment begun in [address] through conversation with:         [x]Patient        [] Family    [] Friend(s)        Reason for Consult: Advance medical directive consult     Spiritual beliefs: (Please include comment if needed)     [x] Identifies with a yadira tradition:         [] Supported by a yadira community:            [] Claims no spiritual orientation:           [] Seeking spiritual identity:                [] Adheres to an individual form of spirituality:           [] Not able to assess:                           Identified resources for coping:      [x] Prayer                               [] Music                  [] Guided Imagery     [x] Family/friends                 [] Pet visits     [] Devotional reading                         [] Unknown     [] Other:                                               Interventions offered during this visit: (See comments for more details)    Patient Interventions: Advance medical directive completed, Affirmation of emotions/emotional suffering, Affirmation of yadira, Catharsis/review of pertinent events in supportive environment, Coping skills reviewed/reinforced, Normalization of emotional/spiritual concerns           Plan of Care:     [] Support spiritual and/or cultural needs    [] Support AMD and/or advance care planning process      [] Support grieving process   [] Coordinate Rites and/or Rituals    [] Coordination with community clergy   [] No spiritual needs identified at this time   [] Detailed Plan of Care below (See Comments)  [] Make referral to Music Therapy  [] Make referral to Pet Therapy     [] Make referral to Addiction services  [] Make referral to [facility]  [] Make referral to Spiritual Care Partner  [] No future visits requested        [x] Follow up upon further referrals     Comments:  visit for Advance Medical Directive (AMD) consult. Pt had previously had her girl friend (who she is no longer with) on her AMD. Pt updated AMD , naming her mom Stefany as primary and father, Eunice Michelledler as her secondary. Pt decided she did not want to complete Section II: and will leave those decisions to her parents.  put a paper copy in chart to be scanned and returned the original and copies to pt. Please contact [phone] [facility] for further support.      [address] Milad Welsh, MACE   [phone]

## 2021-05-16 NOTE — PROGRESS NOTES
Problem: Falls - Risk of  Goal: *Absence of Falls  Description: Document Fadumo Rojas Fall Risk and appropriate interventions in the flowsheet.   Outcome: Progressing Towards Goal  Note: Fall Risk Interventions:  Mobility Interventions: Communicate number of staff needed for ambulation/transfer, Patient to call before getting OOB, PT Consult for mobility concerns         Medication Interventions: Bed/chair exit alarm, Evaluate medications/consider consulting pharmacy, Patient to call before getting OOB    Elimination Interventions: Call light in reach, Patient to call for help with toileting needs, Toileting schedule/hourly rounds              Problem: Seizure Disorder (Adult)  Goal: *STG: Remains free of seizure activity  Outcome: Progressing Towards Goal  Goal: *STG/LTG: Complies with medication therapy  Outcome: Progressing Towards Goal  Goal: *STG: Remains safe in hospital  Outcome: Progressing Towards Goal

## 2021-05-17 NOTE — TELEPHONE ENCOUNTER
Confirmed patient id. Patient states that she is calling because she needs to know about her medication oxcabazepine(trileptal)  States that she had to go to the ER on 5/15/2021 and the doctor in the ER told her she should be taking the tryileptal and she is unsure. Patient thought that she was told to stop the triliptal at her last office visit. Advised that I did not see any notes that states that she should stop that medication. If patient should continue please refill the medication (patient is out of this) and would like for this nurse to call back with Dr Carlos morin.

## 2021-05-18 RX ORDER — OXCARBAZEPINE 300 MG/1
TABLET, FILM COATED ORAL
Qty: 60 TAB | Refills: 11 | Status: SHIPPED | OUTPATIENT
Start: 2021-05-18 | End: 2021-07-27

## 2021-07-27 RX ORDER — OXCARBAZEPINE 300 MG/1
TABLET, FILM COATED ORAL
Qty: 60 TABLET | Refills: 11 | Status: SHIPPED | OUTPATIENT
Start: 2021-07-27 | End: 2021-09-10 | Stop reason: SDUPTHER

## 2021-08-20 ENCOUNTER — HOSPITAL ENCOUNTER (EMERGENCY)
Age: 36
Discharge: HOME | End: 2021-08-20
Attending: STUDENT IN AN ORGANIZED HEALTH CARE EDUCATION/TRAINING PROGRAM
Payer: MEDICARE

## 2021-08-20 ENCOUNTER — APPOINTMENT (OUTPATIENT)
Dept: GENERAL RADIOLOGY | Age: 36
End: 2021-08-20
Attending: STUDENT IN AN ORGANIZED HEALTH CARE EDUCATION/TRAINING PROGRAM
Payer: MEDICARE

## 2021-08-20 VITALS
HEIGHT: 63 IN | WEIGHT: 220 LBS | HEART RATE: 75 BPM | RESPIRATION RATE: 20 BRPM | OXYGEN SATURATION: 100 % | DIASTOLIC BLOOD PRESSURE: 119 MMHG | TEMPERATURE: 98.5 F | BODY MASS INDEX: 38.98 KG/M2 | SYSTOLIC BLOOD PRESSURE: 181 MMHG

## 2021-08-20 DIAGNOSIS — R05.9 COUGH: Primary | ICD-10-CM

## 2021-08-20 LAB
FLUAV RNA SPEC QL NAA+PROBE: NOT DETECTED
FLUBV RNA SPEC QL NAA+PROBE: NOT DETECTED
SARS-COV-2, COV2: NOT DETECTED

## 2021-08-20 PROCEDURE — 74011636637 HC RX REV CODE- 636/637: Performed by: STUDENT IN AN ORGANIZED HEALTH CARE EDUCATION/TRAINING PROGRAM

## 2021-08-20 PROCEDURE — 77030029684 HC NEB SM VOL KT MONA -A

## 2021-08-20 PROCEDURE — 87636 SARSCOV2 & INF A&B AMP PRB: CPT

## 2021-08-20 PROCEDURE — 74011000250 HC RX REV CODE- 250: Performed by: STUDENT IN AN ORGANIZED HEALTH CARE EDUCATION/TRAINING PROGRAM

## 2021-08-20 PROCEDURE — 94640 AIRWAY INHALATION TREATMENT: CPT

## 2021-08-20 PROCEDURE — 93005 ELECTROCARDIOGRAM TRACING: CPT

## 2021-08-20 PROCEDURE — 99283 EMERGENCY DEPT VISIT LOW MDM: CPT

## 2021-08-20 PROCEDURE — 71045 X-RAY EXAM CHEST 1 VIEW: CPT

## 2021-08-20 RX ORDER — PREDNISONE 50 MG/1
50 TABLET ORAL DAILY
Qty: 3 TABLET | Refills: 0 | Status: SHIPPED | OUTPATIENT
Start: 2021-08-20 | End: 2021-08-23

## 2021-08-20 RX ORDER — IPRATROPIUM BROMIDE AND ALBUTEROL SULFATE 2.5; .5 MG/3ML; MG/3ML
3 SOLUTION RESPIRATORY (INHALATION)
Status: COMPLETED | OUTPATIENT
Start: 2021-08-20 | End: 2021-08-20

## 2021-08-20 RX ORDER — PREDNISONE 20 MG/1
60 TABLET ORAL
Status: COMPLETED | OUTPATIENT
Start: 2021-08-20 | End: 2021-08-20

## 2021-08-20 RX ADMIN — IPRATROPIUM BROMIDE AND ALBUTEROL SULFATE 3 ML: .5; 3 SOLUTION RESPIRATORY (INHALATION) at 17:03

## 2021-08-20 RX ADMIN — PREDNISONE 60 MG: 20 TABLET ORAL at 17:24

## 2021-08-20 NOTE — ED TRIAGE NOTES
Patient c/o cough, with \" problems with my COPD and asthma and throughout the pandemic\". Patient states having this episode for 2 weeks, states has been using \" my inhalers and nebulizers without it helping. I went to [facility] and they used 2 medications on my that smelled and tasted bad but it helped (nebulizer).
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
## 2021-08-20 NOTE — ED NOTES
Patient is alert and oriented x 4 and in no acute distress at this time. Respirations are at a regular rate, depth, and pattern. Patient updated on plan of care and has no questions or concerns at this time. Call bell within reach. Will continue to monitor. Please reference nursing assessment. Emergency Department Nursing Plan of Care       The Nursing Plan of Care is developed from the Nursing assessment and Emergency Department Attending provider initial evaluation. The plan of care may be reviewed in the ED Provider note.     The Plan of Care was developed with the following considerations:   Patient / Family readiness to learn indicated by:verbalized understanding and successful return demonstration  Persons(s) to be included in education: patient  Barriers to Learning/Limitations:No    Signed     Gino Rodriguez RN    8/20/2021   4:30 PM

## 2021-08-23 LAB
ATRIAL RATE: 67 BPM
CALCULATED P AXIS, ECG09: 35 DEGREES
CALCULATED R AXIS, ECG10: 16 DEGREES
CALCULATED T AXIS, ECG11: 13 DEGREES
DIAGNOSIS, 93000: NORMAL
P-R INTERVAL, ECG05: 166 MS
Q-T INTERVAL, ECG07: 396 MS
QRS DURATION, ECG06: 86 MS
QTC CALCULATION (BEZET), ECG08: 418 MS
VENTRICULAR RATE, ECG03: 67 BPM

## 2021-09-02 ENCOUNTER — APPOINTMENT (OUTPATIENT)
Dept: GENERAL RADIOLOGY | Age: 36
End: 2021-09-02
Attending: EMERGENCY MEDICINE
Payer: MEDICARE

## 2021-09-02 ENCOUNTER — HOSPITAL ENCOUNTER (EMERGENCY)
Age: 36
Discharge: HOME | End: 2021-09-02
Attending: EMERGENCY MEDICINE
Payer: MEDICARE

## 2021-09-02 ENCOUNTER — APPOINTMENT (OUTPATIENT)
Dept: CT IMAGING | Age: 36
End: 2021-09-02
Attending: EMERGENCY MEDICINE
Payer: MEDICARE

## 2021-09-02 VITALS
WEIGHT: 220 LBS | RESPIRATION RATE: 18 BRPM | BODY MASS INDEX: 38.98 KG/M2 | HEART RATE: 57 BPM | DIASTOLIC BLOOD PRESSURE: 83 MMHG | TEMPERATURE: 98.3 F | OXYGEN SATURATION: 100 % | SYSTOLIC BLOOD PRESSURE: 155 MMHG | HEIGHT: 63 IN

## 2021-09-02 DIAGNOSIS — R40.0 DROWSINESS: Primary | ICD-10-CM

## 2021-09-02 DIAGNOSIS — F14.10 COCAINE ABUSE (HCC): ICD-10-CM

## 2021-09-02 LAB
AMPHET UR QL SCN: NEGATIVE
APPEARANCE UR: CLEAR
BACTERIA URNS QL MICRO: NEGATIVE /HPF
BARBITURATES UR QL SCN: NEGATIVE
BASOPHILS # BLD: 0 K/UL (ref 0–0.1)
BASOPHILS NFR BLD: 1 % (ref 0–1)
BENZODIAZ UR QL: NEGATIVE
BILIRUB UR QL: NEGATIVE
CANNABINOIDS UR QL SCN: NEGATIVE
COCAINE UR QL SCN: POSITIVE
COLOR UR: ABNORMAL
DIFFERENTIAL METHOD BLD: ABNORMAL
DRUG SCRN COMMENT,DRGCM: ABNORMAL
EOSINOPHIL # BLD: 0.2 K/UL (ref 0–0.4)
EOSINOPHIL NFR BLD: 3 % (ref 0–7)
EPITH CASTS URNS QL MICRO: ABNORMAL /LPF
ERYTHROCYTE [DISTWIDTH] IN BLOOD BY AUTOMATED COUNT: 16.2 % (ref 11.5–14.5)
GLUCOSE BLD STRIP.AUTO-MCNC: 109 MG/DL (ref 65–117)
GLUCOSE UR STRIP.AUTO-MCNC: NEGATIVE MG/DL
HCT VFR BLD AUTO: 33.9 % (ref 35–47)
HGB BLD-MCNC: 10.6 G/DL (ref 11.5–16)
HGB UR QL STRIP: ABNORMAL
IMM GRANULOCYTES # BLD AUTO: 0 K/UL (ref 0–0.04)
IMM GRANULOCYTES NFR BLD AUTO: 0 % (ref 0–0.5)
KETONES UR QL STRIP.AUTO: NEGATIVE MG/DL
LEUKOCYTE ESTERASE UR QL STRIP.AUTO: ABNORMAL
LYMPHOCYTES # BLD: 2.6 K/UL (ref 0.8–3.5)
LYMPHOCYTES NFR BLD: 43 % (ref 12–49)
MCH RBC QN AUTO: 24.3 PG (ref 26–34)
MCHC RBC AUTO-ENTMCNC: 31.3 G/DL (ref 30–36.5)
MCV RBC AUTO: 77.8 FL (ref 80–99)
METHADONE UR QL: NEGATIVE
MONOCYTES # BLD: 0.4 K/UL (ref 0–1)
MONOCYTES NFR BLD: 7 % (ref 5–13)
NEUTS SEG # BLD: 2.9 K/UL (ref 1.8–8)
NEUTS SEG NFR BLD: 46 % (ref 32–75)
NITRITE UR QL STRIP.AUTO: NEGATIVE
NRBC # BLD: 0 K/UL (ref 0–0.01)
NRBC BLD-RTO: 0 PER 100 WBC
OPIATES UR QL: NEGATIVE
PCP UR QL: NEGATIVE
PH UR STRIP: 7.5 [PH] (ref 5–8)
PLATELET # BLD AUTO: 371 K/UL (ref 150–400)
PMV BLD AUTO: 9.6 FL (ref 8.9–12.9)
PROT UR STRIP-MCNC: NEGATIVE MG/DL
RBC # BLD AUTO: 4.36 M/UL (ref 3.8–5.2)
RBC #/AREA URNS HPF: ABNORMAL /HPF (ref 0–5)
SERVICE CMNT-IMP: NORMAL
SP GR UR REFRACTOMETRY: 1.01 (ref 1–1.03)
UA: UC IF INDICATED,UAUC: ABNORMAL
UROBILINOGEN UR QL STRIP.AUTO: 0.2 EU/DL (ref 0.2–1)
WBC # BLD AUTO: 6.2 K/UL (ref 3.6–11)
WBC URNS QL MICRO: ABNORMAL /HPF (ref 0–4)

## 2021-09-02 PROCEDURE — 36415 COLL VENOUS BLD VENIPUNCTURE: CPT

## 2021-09-02 PROCEDURE — 81001 URINALYSIS AUTO W/SCOPE: CPT

## 2021-09-02 PROCEDURE — 82962 GLUCOSE BLOOD TEST: CPT

## 2021-09-02 PROCEDURE — 71045 X-RAY EXAM CHEST 1 VIEW: CPT

## 2021-09-02 PROCEDURE — 85025 COMPLETE CBC W/AUTO DIFF WBC: CPT

## 2021-09-02 PROCEDURE — 74011250636 HC RX REV CODE- 250/636: Performed by: EMERGENCY MEDICINE

## 2021-09-02 PROCEDURE — 99284 EMERGENCY DEPT VISIT MOD MDM: CPT

## 2021-09-02 PROCEDURE — 80307 DRUG TEST PRSMV CHEM ANLYZR: CPT

## 2021-09-02 PROCEDURE — 70450 CT HEAD/BRAIN W/O DYE: CPT

## 2021-09-02 RX ORDER — CLOPIDOGREL BISULFATE 75 MG/1
75 TABLET ORAL ONCE
Status: DISCONTINUED | OUTPATIENT
Start: 2021-09-02 | End: 2021-09-03 | Stop reason: HOSPADM

## 2021-09-02 RX ORDER — SODIUM CHLORIDE 0.9 % (FLUSH) 0.9 %
5-40 SYRINGE (ML) INJECTION EVERY 8 HOURS
Status: DISCONTINUED | OUTPATIENT
Start: 2021-09-02 | End: 2021-09-03 | Stop reason: HOSPADM

## 2021-09-02 RX ADMIN — SODIUM CHLORIDE 1000 ML: 9 INJECTION, SOLUTION INTRAVENOUS at 18:20

## 2021-09-02 NOTE — ED TRIAGE NOTES
Patient arrived via EMS with complaint of global weakness, difficulty speaking for two days. Patient was seen at [facility] for same complaints yesterday and evaluated and discharged. Patient is currently awake and oriented to name, place and day of the week with slurred speech.

## 2021-09-02 NOTE — ED NOTES
Pt able to transfer from [facility] to [facility] with stand by assistance.    Pt states she is currently on menstrual cycle
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
## 2021-09-03 NOTE — ED NOTES
Bedside and Verbal shift change report given to Christian Tafoya RN (oncoming nurse) by Janice Baltazar RN (offgoing nurse). Report included the following information SBAR, Kardex, MAR and Recent Results.

## 2021-09-03 NOTE — ED NOTES
This RN went to draw blood work and patient was not in room or in the bathroom. [employer] officer sitting on another patient stated patient walked out about an hour ago. ER [employer] officer contacted someone to go do a wellness check on patient after calling the patient twice with no answer.

## 2021-09-03 NOTE — ED NOTES
Pt left ED w/ IV still in her hand. Attempted to call pt twice but no answer. [employer] called to preform a [facility] check on pt.

## 2021-09-10 ENCOUNTER — OFFICE VISIT (OUTPATIENT)
Dept: NEUROLOGY | Age: 36
End: 2021-09-10
Payer: MEDICARE

## 2021-09-10 VITALS
DIASTOLIC BLOOD PRESSURE: 84 MMHG | HEART RATE: 71 BPM | OXYGEN SATURATION: 99 % | SYSTOLIC BLOOD PRESSURE: 162 MMHG | RESPIRATION RATE: 16 BRPM

## 2021-09-10 DIAGNOSIS — G40.319 INTRACTABLE GENERALIZED IDIOPATHIC EPILEPSY WITHOUT STATUS EPILEPTICUS (HCC): Primary | ICD-10-CM

## 2021-09-10 DIAGNOSIS — F31.9 BIPOLAR 1 DISORDER (HCC): ICD-10-CM

## 2021-09-10 DIAGNOSIS — G44.229 CHRONIC TENSION-TYPE HEADACHE, NOT INTRACTABLE: ICD-10-CM

## 2021-09-10 DIAGNOSIS — A60.03 HERPES SIMPLEX CERVICITIS: ICD-10-CM

## 2021-09-10 DIAGNOSIS — F19.10 SUBSTANCE ABUSE (HCC): ICD-10-CM

## 2021-09-10 PROCEDURE — 99214 OFFICE O/P EST MOD 30 MIN: CPT | Performed by: PSYCHIATRY & NEUROLOGY

## 2021-09-10 PROCEDURE — G9717 DOC PT DX DEP/BP F/U NT REQ: HCPCS | Performed by: PSYCHIATRY & NEUROLOGY

## 2021-09-10 PROCEDURE — G8417 CALC BMI ABV UP PARAM F/U: HCPCS | Performed by: PSYCHIATRY & NEUROLOGY

## 2021-09-10 PROCEDURE — G8427 DOCREV CUR MEDS BY ELIG CLIN: HCPCS | Performed by: PSYCHIATRY & NEUROLOGY

## 2021-09-10 PROCEDURE — G9231 DOC ESRD DIA TRANS PREG: HCPCS | Performed by: PSYCHIATRY & NEUROLOGY

## 2021-09-10 RX ORDER — ACYCLOVIR 400 MG/1
TABLET ORAL
Qty: 30 TABLET | Refills: 11 | Status: SHIPPED | OUTPATIENT
Start: 2021-09-10

## 2021-09-10 RX ORDER — ERGOCALCIFEROL 1.25 MG/1
CAPSULE ORAL
COMMUNITY
Start: 2021-08-19

## 2021-09-10 RX ORDER — METHOCARBAMOL 750 MG/1
750 TABLET, FILM COATED ORAL 3 TIMES DAILY
COMMUNITY
Start: 2021-08-24

## 2021-09-10 RX ORDER — DOXEPIN HYDROCHLORIDE 50 MG/1
CAPSULE ORAL
COMMUNITY
Start: 2021-08-09

## 2021-09-10 RX ORDER — SERTRALINE HYDROCHLORIDE 100 MG/1
TABLET, FILM COATED ORAL
COMMUNITY
Start: 2021-08-09

## 2021-09-10 RX ORDER — OXCARBAZEPINE 300 MG/1
300 TABLET, FILM COATED ORAL 2 TIMES DAILY
Qty: 60 TABLET | Refills: 11 | Status: SHIPPED | OUTPATIENT
Start: 2021-09-10 | End: 2022-06-15 | Stop reason: SDUPTHER
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
## 2021-12-08 PROCEDURE — 93005 ELECTROCARDIOGRAM TRACING: CPT

## 2021-12-09 ENCOUNTER — HOSPITAL ENCOUNTER (EMERGENCY)
Age: 36
Discharge: HOME | End: 2021-12-09
Attending: EMERGENCY MEDICINE
Payer: MEDICARE

## 2021-12-09 VITALS
TEMPERATURE: 97.9 F | DIASTOLIC BLOOD PRESSURE: 110 MMHG | RESPIRATION RATE: 18 BRPM | HEART RATE: 66 BPM | OXYGEN SATURATION: 100 % | BODY MASS INDEX: 35.44 KG/M2 | WEIGHT: 200 LBS | SYSTOLIC BLOOD PRESSURE: 165 MMHG | HEIGHT: 63 IN

## 2021-12-09 DIAGNOSIS — I10 PRIMARY HYPERTENSION: ICD-10-CM

## 2021-12-09 DIAGNOSIS — E87.6 HYPOKALEMIA: Primary | ICD-10-CM

## 2021-12-09 DIAGNOSIS — R51.9 NONINTRACTABLE HEADACHE, UNSPECIFIED CHRONICITY PATTERN, UNSPECIFIED HEADACHE TYPE: ICD-10-CM

## 2021-12-09 LAB
ALBUMIN SERPL-MCNC: 3.3 G/DL (ref 3.5–5)
ALBUMIN/GLOB SERPL: 0.8 {RATIO} (ref 1.1–2.2)
ALP SERPL-CCNC: 58 U/L (ref 45–117)
ALT SERPL-CCNC: 16 U/L (ref 12–78)
ANION GAP SERPL CALC-SCNC: 11 MMOL/L (ref 5–15)
AST SERPL-CCNC: 7 U/L (ref 15–37)
BASOPHILS # BLD: 0 K/UL (ref 0–0.1)
BASOPHILS NFR BLD: 1 % (ref 0–1)
BILIRUB SERPL-MCNC: 0.3 MG/DL (ref 0.2–1)
BUN SERPL-MCNC: 8 MG/DL (ref 6–20)
BUN/CREAT SERPL: 12 (ref 12–20)
CALCIUM SERPL-MCNC: 8.5 MG/DL (ref 8.5–10.1)
CHLORIDE SERPL-SCNC: 101 MMOL/L (ref 97–108)
CO2 SERPL-SCNC: 26 MMOL/L (ref 21–32)
CREAT SERPL-MCNC: 0.66 MG/DL (ref 0.55–1.02)
DIFFERENTIAL METHOD BLD: ABNORMAL
EOSINOPHIL # BLD: 0.1 K/UL (ref 0–0.4)
EOSINOPHIL NFR BLD: 2 % (ref 0–7)
ERYTHROCYTE [DISTWIDTH] IN BLOOD BY AUTOMATED COUNT: 15.8 % (ref 11.5–14.5)
GLOBULIN SER CALC-MCNC: 4.1 G/DL (ref 2–4)
GLUCOSE SERPL-MCNC: 94 MG/DL (ref 65–100)
HCT VFR BLD AUTO: 32.1 % (ref 35–47)
HGB BLD-MCNC: 10.6 G/DL (ref 11.5–16)
IMM GRANULOCYTES # BLD AUTO: 0 K/UL (ref 0–0.04)
IMM GRANULOCYTES NFR BLD AUTO: 0 % (ref 0–0.5)
LYMPHOCYTES # BLD: 2.8 K/UL (ref 0.8–3.5)
LYMPHOCYTES NFR BLD: 37 % (ref 12–49)
MCH RBC QN AUTO: 26.4 PG (ref 26–34)
MCHC RBC AUTO-ENTMCNC: 33 G/DL (ref 30–36.5)
MCV RBC AUTO: 79.9 FL (ref 80–99)
MONOCYTES # BLD: 0.5 K/UL (ref 0–1)
MONOCYTES NFR BLD: 6 % (ref 5–13)
NEUTS SEG # BLD: 4.1 K/UL (ref 1.8–8)
NEUTS SEG NFR BLD: 54 % (ref 32–75)
NRBC # BLD: 0 K/UL (ref 0–0.01)
NRBC BLD-RTO: 0 PER 100 WBC
PLATELET # BLD AUTO: 383 K/UL (ref 150–400)
PMV BLD AUTO: 10.1 FL (ref 8.9–12.9)
POTASSIUM SERPL-SCNC: 2.9 MMOL/L (ref 3.5–5.1)
PROT SERPL-MCNC: 7.4 G/DL (ref 6.4–8.2)
RBC # BLD AUTO: 4.02 M/UL (ref 3.8–5.2)
SODIUM SERPL-SCNC: 138 MMOL/L (ref 136–145)
TROPONIN-HIGH SENSITIVITY: 13 NG/L (ref 0–51)
WBC # BLD AUTO: 7.6 K/UL (ref 3.6–11)

## 2021-12-09 PROCEDURE — 74011250637 HC RX REV CODE- 250/637: Performed by: EMERGENCY MEDICINE

## 2021-12-09 PROCEDURE — 80053 COMPREHEN METABOLIC PANEL: CPT

## 2021-12-09 PROCEDURE — 84484 ASSAY OF TROPONIN QUANT: CPT

## 2021-12-09 PROCEDURE — 85025 COMPLETE CBC W/AUTO DIFF WBC: CPT

## 2021-12-09 PROCEDURE — 99284 EMERGENCY DEPT VISIT MOD MDM: CPT

## 2021-12-09 RX ORDER — LORAZEPAM 1 MG/1
1 TABLET ORAL
Status: COMPLETED | OUTPATIENT
Start: 2021-12-09 | End: 2021-12-09

## 2021-12-09 RX ORDER — POTASSIUM CHLORIDE 750 MG/1
40 TABLET, FILM COATED, EXTENDED RELEASE ORAL
Status: COMPLETED | OUTPATIENT
Start: 2021-12-09 | End: 2021-12-09

## 2021-12-09 RX ORDER — CLONIDINE HYDROCHLORIDE 0.1 MG/1
0.2 TABLET ORAL
Status: COMPLETED | OUTPATIENT
Start: 2021-12-09 | End: 2021-12-09

## 2021-12-09 RX ORDER — BUTALBITAL, ACETAMINOPHEN AND CAFFEINE 50; 325; 40 MG/1; MG/1; MG/1
2 TABLET ORAL
Status: COMPLETED | OUTPATIENT
Start: 2021-12-09 | End: 2021-12-09

## 2021-12-09 RX ORDER — LORAZEPAM 2 MG/ML
1 INJECTION INTRAMUSCULAR
Status: DISCONTINUED | OUTPATIENT
Start: 2021-12-09 | End: 2021-12-09

## 2021-12-09 RX ADMIN — LORAZEPAM 1 MG: 1 TABLET ORAL at 05:11

## 2021-12-09 RX ADMIN — LISINOPRIL 30 MG: 5 TABLET ORAL at 03:30

## 2021-12-09 RX ADMIN — CLONIDINE HYDROCHLORIDE 0.2 MG: 0.1 TABLET ORAL at 05:22

## 2021-12-09 RX ADMIN — POTASSIUM CHLORIDE 40 MEQ: 750 TABLET, EXTENDED RELEASE ORAL at 05:55

## 2021-12-09 RX ADMIN — BUTALBITAL, ACETAMINOPHEN, AND CAFFEINE 2 TABLET: 50; 325; 40 TABLET ORAL at 03:30

## 2021-12-09 RX ADMIN — POTASSIUM CHLORIDE 40 MEQ: 750 TABLET, EXTENDED RELEASE ORAL at 06:30
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
## 2021-12-09 NOTE — ED NOTES
Emergency Department Nursing Plan of Care       The Nursing Plan of Care is developed from the Nursing assessment and Emergency Department Attending provider initial evaluation. The plan of care may be reviewed in the ED Provider note.     The Plan of Care was developed with the following considerations:   Patient / Family readiness to learn indicated by:verbalized understanding  Persons(s) to be included in education: patient  Barriers to Learning/Limitations:No    Signed     Parker Aponte RN    12/9/2021   3:47 AM

## 2021-12-09 NOTE — ED TRIAGE NOTES
Pt presents to ED with c/o headache beginning at 1300. Pt reports taking Aleve after headache began. Pt states she has been sitting out in the cold for a few hours and has not had her night medication. As this RN was leaving the room, pt states \"Am I suppose to be having a tightness in my chest\".

## 2021-12-09 NOTE — ED NOTES
Discharge instructions were given to the patient by this RN. The patient left the Emergency Department ambulatory, alert and oriented and in no acute distress with 0 prescriptions. The patient was encouraged to call or return to the ED for worsening issues or problems and was encouraged to schedule a follow up appointment for continuing care. The patient verbalized understanding of discharge instructions and prescriptions, all questions were answered. The patient has no further concerns at this time.

## 2021-12-12 LAB
ATRIAL RATE: 75 BPM
CALCULATED P AXIS, ECG09: 45 DEGREES
CALCULATED R AXIS, ECG10: 21 DEGREES
CALCULATED T AXIS, ECG11: 12 DEGREES
DIAGNOSIS, 93000: NORMAL
P-R INTERVAL, ECG05: 160 MS
Q-T INTERVAL, ECG07: 394 MS
QRS DURATION, ECG06: 86 MS
QTC CALCULATION (BEZET), ECG08: 439 MS
VENTRICULAR RATE, ECG03: 75 BPM

## 2022-01-11 ENCOUNTER — TELEPHONE (OUTPATIENT)
Dept: NEUROLOGY | Age: 37
End: 2022-01-11

## 2022-01-11 NOTE — TELEPHONE ENCOUNTER
VOICEMAIL    Pt called at 10:40 for her 10:30 appt to cancel. Transportation called to say they couldn't bring her. Wants to know who Dr. Salina Vines wants to continue her care and a new appt within the next two weeks.  [phone]

## 2022-01-20 ENCOUNTER — VIRTUAL VISIT (OUTPATIENT)
Dept: NEUROLOGY | Age: 37
End: 2022-01-20
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
## 2022-01-20 NOTE — PROGRESS NOTES
This encounter was created in error - please disregard. Attempted to call several times at both appointment times and send several links through doxy. me however did not get an answer.

## 2022-03-19 PROBLEM — G40.909 SEIZURE DISORDER (HCC): Status: ACTIVE | Noted: 2021-05-15

## 2022-03-19 PROBLEM — E66.01 OBESITY, MORBID (HCC): Status: ACTIVE | Noted: 2018-05-14

## 2022-04-17 ENCOUNTER — HOSPITAL ENCOUNTER (EMERGENCY)
Age: 37
Discharge: HOME | End: 2022-04-17
Attending: EMERGENCY MEDICINE
Payer: MEDICARE

## 2022-04-17 VITALS
SYSTOLIC BLOOD PRESSURE: 156 MMHG | RESPIRATION RATE: 20 BRPM | WEIGHT: 225 LBS | HEART RATE: 69 BPM | OXYGEN SATURATION: 99 % | BODY MASS INDEX: 38.41 KG/M2 | DIASTOLIC BLOOD PRESSURE: 85 MMHG | TEMPERATURE: 98.5 F | HEIGHT: 64 IN

## 2022-04-17 DIAGNOSIS — I10 HYPERTENSION, UNSPECIFIED TYPE: ICD-10-CM

## 2022-04-17 DIAGNOSIS — F43.0 ACUTE STRESS REACTION: Primary | ICD-10-CM

## 2022-04-17 PROCEDURE — 90791 PSYCH DIAGNOSTIC EVALUATION: CPT

## 2022-04-17 PROCEDURE — 74011250636 HC RX REV CODE- 250/636: Performed by: EMERGENCY MEDICINE

## 2022-04-17 PROCEDURE — 96372 THER/PROPH/DIAG INJ SC/IM: CPT

## 2022-04-17 PROCEDURE — 74011250637 HC RX REV CODE- 250/637: Performed by: EMERGENCY MEDICINE

## 2022-04-17 PROCEDURE — 99284 EMERGENCY DEPT VISIT MOD MDM: CPT

## 2022-04-17 RX ORDER — LISINOPRIL 20 MG/1
20 TABLET ORAL
Status: DISCONTINUED | OUTPATIENT
Start: 2022-04-17 | End: 2022-04-17 | Stop reason: HOSPADM

## 2022-04-17 RX ORDER — CLONIDINE HYDROCHLORIDE 0.1 MG/1
0.2 TABLET ORAL
Status: COMPLETED | OUTPATIENT
Start: 2022-04-17 | End: 2022-04-17

## 2022-04-17 RX ORDER — HYDROXYZINE HYDROCHLORIDE 25 MG/ML
50 INJECTION, SOLUTION INTRAMUSCULAR
Status: COMPLETED | OUTPATIENT
Start: 2022-04-17 | End: 2022-04-17

## 2022-04-17 RX ORDER — HYDROXYZINE PAMOATE 25 MG/1
25 CAPSULE ORAL
Qty: 20 CAPSULE | Refills: 0 | Status: SHIPPED | OUTPATIENT
Start: 2022-04-17 | End: 2022-05-01

## 2022-04-17 RX ADMIN — CLONIDINE HYDROCHLORIDE 0.2 MG: 0.1 TABLET ORAL at 16:08

## 2022-04-17 RX ADMIN — HYDROXYZINE HYDROCHLORIDE 50 MG: 25 INJECTION, SOLUTION INTRAMUSCULAR at 16:09
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
## 2022-06-15 NOTE — TELEPHONE ENCOUNTER
Requested Prescriptions     Pending Prescriptions Disp Refills    OXcarbazepine (TRILEPTAL) 300 mg tablet 180 Tablet 0     Sig: Take 1 Tablet by mouth two (2) times a day.     Last fill 9/10/21 Last visit 1/11/22 Next visit no show Dr Lacy Christine 5/16/22

## 2022-06-17 RX ORDER — OXCARBAZEPINE 300 MG/1
300 TABLET, FILM COATED ORAL 2 TIMES DAILY
Qty: 180 TABLET | Refills: 0 | Status: SHIPPED | OUTPATIENT
Start: 2022-06-17 | End: 2022-08-24

## 2022-08-24 RX ORDER — OXCARBAZEPINE 300 MG/1
TABLET, FILM COATED ORAL
Qty: 60 TABLET | Refills: 10 | Status: SHIPPED | OUTPATIENT
Start: 2022-08-24

## 2022-10-04 ENCOUNTER — HOSPITAL ENCOUNTER (EMERGENCY)
Age: 37
Discharge: HOME | End: 2022-10-04
Attending: EMERGENCY MEDICINE | Admitting: EMERGENCY MEDICINE
Payer: MEDICARE

## 2022-10-04 VITALS
RESPIRATION RATE: 18 BRPM | HEIGHT: 63 IN | HEART RATE: 77 BPM | OXYGEN SATURATION: 99 % | SYSTOLIC BLOOD PRESSURE: 153 MMHG | WEIGHT: 210 LBS | DIASTOLIC BLOOD PRESSURE: 99 MMHG | BODY MASS INDEX: 37.21 KG/M2 | TEMPERATURE: 98.5 F

## 2022-10-04 DIAGNOSIS — N73.9 ABSCESS OF FEMALE GENITALIA: Primary | ICD-10-CM

## 2022-10-04 DIAGNOSIS — Z91.14 NONCOMPLIANCE WITH MEDICATION REGIMEN: ICD-10-CM

## 2022-10-04 DIAGNOSIS — I10 UNCONTROLLED HYPERTENSION: ICD-10-CM

## 2022-10-04 LAB
APPEARANCE UR: ABNORMAL
BACTERIA URNS QL MICRO: ABNORMAL /HPF
BILIRUB UR QL: NEGATIVE
COLOR UR: ABNORMAL
EPITH CASTS URNS QL MICRO: ABNORMAL /LPF
GLUCOSE UR STRIP.AUTO-MCNC: NEGATIVE MG/DL
HCG UR QL: NEGATIVE
HGB UR QL STRIP: NEGATIVE
KETONES UR QL STRIP.AUTO: ABNORMAL MG/DL
LEUKOCYTE ESTERASE UR QL STRIP.AUTO: ABNORMAL
NITRITE UR QL STRIP.AUTO: NEGATIVE
PH UR STRIP: 6 [PH] (ref 5–8)
PROT UR STRIP-MCNC: 30 MG/DL
RBC #/AREA URNS HPF: ABNORMAL /HPF (ref 0–5)
SP GR UR REFRACTOMETRY: 1 (ref 1–1.03)
UROBILINOGEN UR QL STRIP.AUTO: 1 EU/DL (ref 0.2–1)
WBC URNS QL MICRO: ABNORMAL /HPF (ref 0–4)

## 2022-10-04 PROCEDURE — 74011250637 HC RX REV CODE- 250/637: Performed by: PHYSICIAN ASSISTANT

## 2022-10-04 PROCEDURE — 81025 URINE PREGNANCY TEST: CPT

## 2022-10-04 PROCEDURE — 81001 URINALYSIS AUTO W/SCOPE: CPT

## 2022-10-04 PROCEDURE — 87086 URINE CULTURE/COLONY COUNT: CPT

## 2022-10-04 PROCEDURE — 99283 EMERGENCY DEPT VISIT LOW MDM: CPT | Performed by: EMERGENCY MEDICINE

## 2022-10-04 RX ORDER — CEPHALEXIN 500 MG/1
500 CAPSULE ORAL
Status: COMPLETED | OUTPATIENT
Start: 2022-10-04 | End: 2022-10-04

## 2022-10-04 RX ORDER — SULFAMETHOXAZOLE AND TRIMETHOPRIM 800; 160 MG/1; MG/1
1 TABLET ORAL 2 TIMES DAILY
Qty: 14 TABLET | Refills: 0 | Status: SHIPPED | OUTPATIENT
Start: 2022-10-04 | End: 2022-10-11

## 2022-10-04 RX ORDER — CEPHALEXIN 500 MG/1
500 CAPSULE ORAL 4 TIMES DAILY
Qty: 28 CAPSULE | Refills: 0 | Status: SHIPPED | OUTPATIENT
Start: 2022-10-04 | End: 2022-10-11

## 2022-10-04 RX ORDER — ONDANSETRON 4 MG/1
4 TABLET, ORALLY DISINTEGRATING ORAL
Status: COMPLETED | OUTPATIENT
Start: 2022-10-04 | End: 2022-10-04

## 2022-10-04 RX ORDER — CHLORTHALIDONE 25 MG/1
25 TABLET ORAL
Status: COMPLETED | OUTPATIENT
Start: 2022-10-04 | End: 2022-10-04

## 2022-10-04 RX ORDER — SULFAMETHOXAZOLE AND TRIMETHOPRIM 800; 160 MG/1; MG/1
1 TABLET ORAL
Status: COMPLETED | OUTPATIENT
Start: 2022-10-04 | End: 2022-10-04

## 2022-10-04 RX ADMIN — CEPHALEXIN 500 MG: 500 CAPSULE ORAL at 19:21

## 2022-10-04 RX ADMIN — SULFAMETHOXAZOLE AND TRIMETHOPRIM 1 TABLET: 800; 160 TABLET ORAL at 19:21

## 2022-10-04 RX ADMIN — ONDANSETRON 4 MG: 4 TABLET, ORALLY DISINTEGRATING ORAL at 19:49

## 2022-10-04 RX ADMIN — CHLORTHALIDONE 25 MG: 25 TABLET ORAL at 18:57

## 2022-10-04 NOTE — Clinical Note
[facility] EMERGENCY DEPT  [address] [facility] [id number] [phone]    Work/School Note    Date: 10/4/2022    To Whom It May concern: Leonard German was seen and treated today in the emergency room by the following provider(s):  Attending Provider: Enrrique Hernandez MD  Physician Assistant: Serena Pena, [address]. Leonard German is excused from work/school on 10/04/22 and 10/05/22. She is medically clear to return to work/school on 10/6/2022.        Sincerely,          Kellen Valerio, [address]
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
## 2022-10-04 NOTE — ED NOTES
This RN into room to discharge pt, pt reports she feels very nauseated. Provider made aware and orders received for PO zofran. Will reassess pt for discharge in a few mins.

## 2022-10-04 NOTE — ED NOTES
Pt presents to ED ambulatory complaining of skin problem to groin x 1 week that is draining. Pt very hypertensive, pt reports she is unsure if she took gher antihypertensives today. Pt is alert and oriented x 4, RR even and unlabored. Assessment completed and pt updated on plan of care. Call bell in reach. Emergency Department Nursing Plan of Care       The Nursing Plan of Care is developed from the Nursing assessment and Emergency Department Attending provider initial evaluation. The plan of care may be reviewed in the ED Provider note.     The Plan of Care was developed with the following considerations:   Patient / Family readiness to learn indicated by:verbalized understanding  Persons(s) to be included in education: patient  Barriers to Learning/Limitations:No    Signed     [place]    10/4/2022   7:02 PM

## 2022-10-04 NOTE — ED TRIAGE NOTES
Pt reports skin boil and irritation in the perineum x 1 week. States she hasn't been able to look at the area, but feels as if there may be drainage. Also pt hypertensive in triage  . BP readings as followed 202/102 , 214/122.

## 2022-10-04 NOTE — ED NOTES
I have assumed role of preceptor for [employer]. I have reviewed and accepted their charting, assessment, and medication administrations.

## 2022-10-06 LAB
BACTERIA SPEC CULT: NORMAL
CC UR VC: NORMAL
SERVICE CMNT-IMP: NORMAL

## 2022-11-08 ENCOUNTER — HOSPITAL ENCOUNTER (EMERGENCY)
Age: 37
Discharge: HOME | End: 2022-11-09
Attending: EMERGENCY MEDICINE
Payer: MEDICARE

## 2022-11-08 DIAGNOSIS — R68.89 FLU-LIKE SYMPTOMS: Primary | ICD-10-CM

## 2022-11-08 DIAGNOSIS — I10 PRIMARY HYPERTENSION: ICD-10-CM

## 2022-11-08 PROCEDURE — 99284 EMERGENCY DEPT VISIT MOD MDM: CPT

## 2022-11-08 PROCEDURE — 96372 THER/PROPH/DIAG INJ SC/IM: CPT

## 2022-11-08 PROCEDURE — 74011250636 HC RX REV CODE- 250/636: Performed by: EMERGENCY MEDICINE

## 2022-11-08 PROCEDURE — 74011250637 HC RX REV CODE- 250/637: Performed by: EMERGENCY MEDICINE

## 2022-11-08 RX ORDER — PROCHLORPERAZINE EDISYLATE 5 MG/ML
10 INJECTION INTRAMUSCULAR; INTRAVENOUS
Status: DISCONTINUED | OUTPATIENT
Start: 2022-11-08 | End: 2022-11-09 | Stop reason: HOSPADM

## 2022-11-08 RX ORDER — PROMETHAZINE HYDROCHLORIDE 25 MG/1
25 SUPPOSITORY RECTAL
Qty: 12 SUPPOSITORY | Refills: 0 | Status: SHIPPED | OUTPATIENT
Start: 2022-11-08

## 2022-11-08 RX ORDER — AMLODIPINE BESYLATE 5 MG/1
5 TABLET ORAL
Status: COMPLETED | OUTPATIENT
Start: 2022-11-08 | End: 2022-11-08

## 2022-11-08 RX ORDER — CLONIDINE HYDROCHLORIDE 0.1 MG/1
0.2 TABLET ORAL
Status: COMPLETED | OUTPATIENT
Start: 2022-11-08 | End: 2022-11-09

## 2022-11-08 RX ORDER — ACETAMINOPHEN 500 MG
1000 TABLET ORAL ONCE
Status: COMPLETED | OUTPATIENT
Start: 2022-11-08 | End: 2022-11-09

## 2022-11-08 RX ORDER — ONDANSETRON 4 MG/1
4 TABLET, ORALLY DISINTEGRATING ORAL
Qty: 20 TABLET | Refills: 0 | Status: SHIPPED | OUTPATIENT
Start: 2022-11-08

## 2022-11-08 RX ORDER — ONDANSETRON 4 MG/1
8 TABLET, ORALLY DISINTEGRATING ORAL
Status: COMPLETED | OUTPATIENT
Start: 2022-11-08 | End: 2022-11-08

## 2022-11-08 RX ADMIN — AMLODIPINE BESYLATE 5 MG: 5 TABLET ORAL at 23:04

## 2022-11-08 RX ADMIN — PROCHLORPERAZINE EDISYLATE 10 MG: 5 INJECTION INTRAMUSCULAR; INTRAVENOUS at 22:25

## 2022-11-08 RX ADMIN — ONDANSETRON 8 MG: 4 TABLET, ORALLY DISINTEGRATING ORAL at 22:25

## 2022-11-09 VITALS
HEART RATE: 80 BPM | WEIGHT: 211.64 LBS | BODY MASS INDEX: 37.5 KG/M2 | DIASTOLIC BLOOD PRESSURE: 97 MMHG | HEIGHT: 63 IN | OXYGEN SATURATION: 99 % | TEMPERATURE: 99.9 F | RESPIRATION RATE: 12 BRPM | SYSTOLIC BLOOD PRESSURE: 171 MMHG

## 2022-11-09 PROCEDURE — 74011250637 HC RX REV CODE- 250/637: Performed by: EMERGENCY MEDICINE

## 2022-11-09 RX ORDER — ACETAMINOPHEN 500 MG
500 TABLET ORAL
Status: COMPLETED | OUTPATIENT
Start: 2022-11-09 | End: 2022-11-09

## 2022-11-09 RX ADMIN — CLONIDINE HYDROCHLORIDE 0.2 MG: 0.1 TABLET ORAL at 00:00

## 2022-11-09 RX ADMIN — ACETAMINOPHEN 1000 MG: 500 TABLET, FILM COATED ORAL at 00:00

## 2022-11-09 RX ADMIN — ACETAMINOPHEN 500 MG: 500 TABLET, FILM COATED ORAL at 00:10

## 2022-11-09 NOTE — ED NOTES
Pt presents to ED reporting flu-like symptoms x today. Pt reports she was around someone who was sick but is unsure of what they had. Pt endorsing N/V, fever, and generalized body aches. Pt afebrile in ED, no active vomiting noted. Pt appears to be in NAD. Emergency Department Nursing Plan of Care       The Nursing Plan of Care is developed from the Nursing assessment and Emergency Department Attending provider initial evaluation. The plan of care may be reviewed in the ED Provider note.     The Plan of Care was developed with the following considerations:   Patient / Family readiness to learn indicated by:verbalized understanding  Persons(s) to be included in education: patient  Barriers to Learning/Limitations:No    Signed     Kevin Montez RN    11/8/2022   10:32 PM

## 2022-11-09 NOTE — ED NOTES
Discharge instructions were given to the patient by Jessica Valenzuela RN. The patient left the Emergency Department ambulatory, alert and oriented and in no acute distress with 2 prescriptions. The patient was encouraged to call or return to the ED for worsening issues or problems and was encouraged to schedule a follow up appointment for continuing care. The patient verbalized understanding of discharge instructions and prescriptions, all questions were answered. The patient has no further concerns at this time.

## 2022-11-09 NOTE — ED TRIAGE NOTES
Pt presents to ED via EMS for generalized body aches, fever, vomiting x2 days. Pt denies taking medication for symptoms. Pt is not vacc for Flu nor COVID. Hx HTN, has not had Bp med today.
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
## 2023-03-19 ENCOUNTER — HOSPITAL ENCOUNTER (EMERGENCY)
Age: 38
Discharge: HOME | End: 2023-03-19
Attending: EMERGENCY MEDICINE
Payer: MEDICARE

## 2023-03-19 VITALS
SYSTOLIC BLOOD PRESSURE: 182 MMHG | HEART RATE: 79 BPM | OXYGEN SATURATION: 100 % | RESPIRATION RATE: 16 BRPM | TEMPERATURE: 97.9 F | DIASTOLIC BLOOD PRESSURE: 110 MMHG

## 2023-03-19 DIAGNOSIS — F41.8 ANXIETY ABOUT HEALTH: Primary | ICD-10-CM

## 2023-03-19 PROCEDURE — 99283 EMERGENCY DEPT VISIT LOW MDM: CPT

## 2023-03-19 NOTE — ED TRIAGE NOTES
Pt arrives via EMS from home with cc of auditory hallucinations \"all the time\". Pt has no SI or HI. She also has hx of HTN for which she is noncompliant with meds. She has pain on the whole left side of her body. She used to take psych meds but her GI doctor told her to stop taking them due to stomach upset.

## 2023-03-20 NOTE — ED NOTES
Setup pt ride back to home address listed confirmed with pt, trip ID #[id number]    RN reviewed discharge instructions with the patient. The patient verbalized understanding. Pt left ED ambulatory with discharge papers in hand.

## 2023-03-20 NOTE — BSMART NOTE
BSMART assessment completed, and suicide risk level noted to be no risk. Primary Nurse Danita Salazar and Physician Mortimer Bookbinder notified. Concerns not observed. Security/Off- has not been notified. This writer reviewed the [address] in nursing flowsheet and the risk level assigned is no risk. Based on this assessment, the risk of suicide is no risk and the plan is for patient to discharge home with resources. BSMART was consulted to provide resources for patient. Patient was provided resources for psychiatry and therapy. Patient was encouraged to follow-up with doctor who prescribed anxiety medications.        Taryn Johnson,, Resident in Counseling
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
## 2023-04-21 ENCOUNTER — APPOINTMENT (OUTPATIENT)
Dept: CT IMAGING | Age: 38
End: 2023-04-21
Attending: STUDENT IN AN ORGANIZED HEALTH CARE EDUCATION/TRAINING PROGRAM
Payer: MEDICARE

## 2023-04-21 ENCOUNTER — HOSPITAL ENCOUNTER (EMERGENCY)
Age: 38
Discharge: HOME | End: 2023-04-21
Attending: STUDENT IN AN ORGANIZED HEALTH CARE EDUCATION/TRAINING PROGRAM
Payer: MEDICARE

## 2023-04-21 VITALS
HEART RATE: 83 BPM | DIASTOLIC BLOOD PRESSURE: 83 MMHG | WEIGHT: 218 LBS | BODY MASS INDEX: 38.62 KG/M2 | SYSTOLIC BLOOD PRESSURE: 174 MMHG | HEIGHT: 63 IN | TEMPERATURE: 98.7 F | RESPIRATION RATE: 18 BRPM | OXYGEN SATURATION: 98 %

## 2023-04-21 DIAGNOSIS — R55 SYNCOPE AND COLLAPSE: Primary | ICD-10-CM

## 2023-04-21 DIAGNOSIS — E86.0 DEHYDRATION: ICD-10-CM

## 2023-04-21 DIAGNOSIS — R51.9 ACUTE NONINTRACTABLE HEADACHE, UNSPECIFIED HEADACHE TYPE: ICD-10-CM

## 2023-04-21 LAB
ALBUMIN SERPL-MCNC: 3.6 G/DL (ref 3.5–5)
ALBUMIN/GLOB SERPL: 0.9 (ref 1.1–2.2)
ALP SERPL-CCNC: 57 U/L (ref 45–117)
ALT SERPL-CCNC: 15 U/L (ref 12–78)
ANION GAP SERPL CALC-SCNC: 8 MMOL/L (ref 5–15)
AST SERPL-CCNC: 12 U/L (ref 15–37)
ATRIAL RATE: 87 BPM
BASOPHILS # BLD: 0 K/UL (ref 0–0.1)
BASOPHILS NFR BLD: 1 % (ref 0–1)
BILIRUB SERPL-MCNC: 0.2 MG/DL (ref 0.2–1)
BUN SERPL-MCNC: 14 MG/DL (ref 6–20)
BUN/CREAT SERPL: 16 (ref 12–20)
CALCIUM SERPL-MCNC: 9.1 MG/DL (ref 8.5–10.1)
CALCULATED P AXIS, ECG09: 46 DEGREES
CALCULATED R AXIS, ECG10: 18 DEGREES
CALCULATED T AXIS, ECG11: 13 DEGREES
CHLORIDE SERPL-SCNC: 101 MMOL/L (ref 97–108)
CO2 SERPL-SCNC: 29 MMOL/L (ref 21–32)
CREAT SERPL-MCNC: 0.87 MG/DL (ref 0.55–1.02)
DIAGNOSIS, 93000: NORMAL
DIFFERENTIAL METHOD BLD: ABNORMAL
EOSINOPHIL # BLD: 0.1 K/UL (ref 0–0.4)
EOSINOPHIL NFR BLD: 3 % (ref 0–7)
ERYTHROCYTE [DISTWIDTH] IN BLOOD BY AUTOMATED COUNT: 15 % (ref 11.5–14.5)
GLOBULIN SER CALC-MCNC: 3.9 G/DL (ref 2–4)
GLUCOSE SERPL-MCNC: 93 MG/DL (ref 65–100)
HCT VFR BLD AUTO: 34.9 % (ref 35–47)
HGB BLD-MCNC: 11.6 G/DL (ref 11.5–16)
IMM GRANULOCYTES # BLD AUTO: 0 K/UL (ref 0–0.04)
IMM GRANULOCYTES NFR BLD AUTO: 0 % (ref 0–0.5)
LYMPHOCYTES # BLD: 1.4 K/UL (ref 0.8–3.5)
LYMPHOCYTES NFR BLD: 34 % (ref 12–49)
MAGNESIUM SERPL-MCNC: 2.1 MG/DL (ref 1.6–2.4)
MCH RBC QN AUTO: 26.7 PG (ref 26–34)
MCHC RBC AUTO-ENTMCNC: 33.2 G/DL (ref 30–36.5)
MCV RBC AUTO: 80.4 FL (ref 80–99)
MONOCYTES # BLD: 0.4 K/UL (ref 0–1)
MONOCYTES NFR BLD: 9 % (ref 5–13)
NEUTS SEG # BLD: 2.2 K/UL (ref 1.8–8)
NEUTS SEG NFR BLD: 53 % (ref 32–75)
NRBC # BLD: 0 K/UL (ref 0–0.01)
NRBC BLD-RTO: 0 PER 100 WBC
P-R INTERVAL, ECG05: 160 MS
PLATELET # BLD AUTO: 287 K/UL (ref 150–400)
PMV BLD AUTO: 9.6 FL (ref 8.9–12.9)
POTASSIUM SERPL-SCNC: 3.6 MMOL/L (ref 3.5–5.1)
PROT SERPL-MCNC: 7.5 G/DL (ref 6.4–8.2)
Q-T INTERVAL, ECG07: 358 MS
QRS DURATION, ECG06: 80 MS
QTC CALCULATION (BEZET), ECG08: 430 MS
RBC # BLD AUTO: 4.34 M/UL (ref 3.8–5.2)
SODIUM SERPL-SCNC: 138 MMOL/L (ref 136–145)
VENTRICULAR RATE, ECG03: 87 BPM
WBC # BLD AUTO: 4.2 K/UL (ref 3.6–11)

## 2023-04-21 PROCEDURE — 36415 COLL VENOUS BLD VENIPUNCTURE: CPT

## 2023-04-21 PROCEDURE — 80183 DRUG SCRN QUANT OXCARBAZEPIN: CPT

## 2023-04-21 PROCEDURE — 85025 COMPLETE CBC W/AUTO DIFF WBC: CPT

## 2023-04-21 PROCEDURE — 74011250637 HC RX REV CODE- 250/637: Performed by: STUDENT IN AN ORGANIZED HEALTH CARE EDUCATION/TRAINING PROGRAM

## 2023-04-21 PROCEDURE — 80053 COMPREHEN METABOLIC PANEL: CPT

## 2023-04-21 PROCEDURE — 99284 EMERGENCY DEPT VISIT MOD MDM: CPT

## 2023-04-21 PROCEDURE — 70450 CT HEAD/BRAIN W/O DYE: CPT

## 2023-04-21 PROCEDURE — 74011250636 HC RX REV CODE- 250/636: Performed by: STUDENT IN AN ORGANIZED HEALTH CARE EDUCATION/TRAINING PROGRAM

## 2023-04-21 PROCEDURE — 83735 ASSAY OF MAGNESIUM: CPT

## 2023-04-21 PROCEDURE — 96360 HYDRATION IV INFUSION INIT: CPT

## 2023-04-21 RX ORDER — OXCARBAZEPINE 300 MG/1
TABLET, FILM COATED ORAL
Qty: 60 TABLET | Refills: 10 | Status: SHIPPED | OUTPATIENT
Start: 2023-04-21

## 2023-04-21 RX ORDER — OXCARBAZEPINE 150 MG/1
300 TABLET, FILM COATED ORAL ONCE
Status: COMPLETED | OUTPATIENT
Start: 2023-04-21 | End: 2023-04-21

## 2023-04-21 RX ADMIN — OXCARBAZEPINE 300 MG: 150 TABLET, FILM COATED ORAL at 18:47

## 2023-04-21 RX ADMIN — SODIUM CHLORIDE 1000 ML: 9 INJECTION, SOLUTION INTRAVENOUS at 18:50
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
## 2023-04-21 NOTE — ED TRIAGE NOTES
Pt arrived to ED via [facility]  with c/o seizure with a hx of epilepsy. , afebrile. NO interventions from EMS noted. EMS reports seizure lasting 30 seconds in route. Pt was not postictal in ambulance. No LOC. No loss of bladder. A&O x 4 after seizure. Speaking full clear sentences. Last trilipta this AM. Needs to come to the hospital to get it refilled. Pt is in no acute distress. Will continue to monitor. See nursing assessment. Safety precautions in place; call light within reach. Emergency Department Nursing Plan of Care       The Nursing Plan of Care is developed from the Nursing assessment and Emergency Department Attending provider initial evaluation. The plan of care may be reviewed in the ED Provider note.     The Plan of Care was developed with the following considerations:   Patient / Family readiness to learn indicated by:verbalized understanding  Persons(s) to be included in education: patient  Barriers to Learning/Limitations:No    Signed     Sarah Shepard RN    4/21/2023   5:58 PM

## 2023-04-21 NOTE — ED NOTES
Patient reports 10/10 headache. States she was climbing up the stairs leading to her apartment when she blacked out. Pt is unsure if she had a seizure. Patient is alert and oriented x 4.

## 2023-04-22 NOTE — ED NOTES
Discharge instructions were given to the patient by Dolly Lugo RN. The patient left the Emergency Department ambulatory, alert and oriented and in no acute distress with 1 prescription. The patient was encouraged to call or return to the ED for worsening issues or problems and was encouraged to schedule a follow up appointment for continuing care. The patient verbalized understanding of discharge instructions and prescriptions, all questions were answered. The patient has no further concerns at this time.

## 2023-04-22 NOTE — ED NOTES
Patient ambulated to bathroom and back to room with a steady gait. 2010 Provided with 1 can of gingerale and x2 packs of crackers.  No nausea reported

## 2023-04-24 LAB
ATRIAL RATE: 87 BPM
CALCULATED P AXIS, ECG09: 46 DEGREES
CALCULATED R AXIS, ECG10: 18 DEGREES
CALCULATED T AXIS, ECG11: 13 DEGREES
DIAGNOSIS, 93000: NORMAL
OXCARBAZEPINE SERPL-MCNC: <1 UG/ML (ref 10–35)
P-R INTERVAL, ECG05: 160 MS
Q-T INTERVAL, ECG07: 358 MS
QRS DURATION, ECG06: 80 MS
QTC CALCULATION (BEZET), ECG08: 430 MS
VENTRICULAR RATE, ECG03: 87 BPM

## 2023-06-27 ENCOUNTER — CLINICAL DOCUMENTATION (OUTPATIENT)
Age: 38
End: 2023-06-27

## 2023-09-07 ENCOUNTER — APPOINTMENT (OUTPATIENT)
Facility: HOSPITAL | Age: 38
End: 2023-09-07
Payer: MEDICARE

## 2023-09-07 ENCOUNTER — HOSPITAL ENCOUNTER (EMERGENCY)
Facility: HOSPITAL | Age: 38
Discharge: HOME | End: 2023-09-07
Attending: EMERGENCY MEDICINE
Payer: MEDICARE

## 2023-09-07 ENCOUNTER — OFFICE VISIT (OUTPATIENT)
Age: 38
End: 2023-09-07
Payer: MEDICARE

## 2023-09-07 VITALS
WEIGHT: 188 LBS | OXYGEN SATURATION: 98 % | HEART RATE: 68 BPM | RESPIRATION RATE: 18 BRPM | SYSTOLIC BLOOD PRESSURE: 195 MMHG | DIASTOLIC BLOOD PRESSURE: 102 MMHG | HEIGHT: 63 IN | BODY MASS INDEX: 33.31 KG/M2 | TEMPERATURE: 98 F

## 2023-09-07 VITALS — WEIGHT: 188.2 LBS | DIASTOLIC BLOOD PRESSURE: 86 MMHG | BODY MASS INDEX: 33.34 KG/M2 | SYSTOLIC BLOOD PRESSURE: 146 MMHG

## 2023-09-07 DIAGNOSIS — R06.02 SHORTNESS OF BREATH: ICD-10-CM

## 2023-09-07 DIAGNOSIS — N92.1 MENORRHAGIA WITH IRREGULAR CYCLE: ICD-10-CM

## 2023-09-07 DIAGNOSIS — I10 ESSENTIAL HYPERTENSION: ICD-10-CM

## 2023-09-07 DIAGNOSIS — N93.9 ABNORMAL UTERINE BLEEDING (AUB): ICD-10-CM

## 2023-09-07 DIAGNOSIS — Z01.419 ENCOUNTER FOR ANNUAL ROUTINE GYNECOLOGICAL EXAMINATION: ICD-10-CM

## 2023-09-07 DIAGNOSIS — Z11.3 SCREEN FOR STD (SEXUALLY TRANSMITTED DISEASE): ICD-10-CM

## 2023-09-07 DIAGNOSIS — Z11.3 SCREEN FOR STD (SEXUALLY TRANSMITTED DISEASE): Primary | ICD-10-CM

## 2023-09-07 DIAGNOSIS — R07.9 CHEST PAIN, UNSPECIFIED TYPE: Primary | ICD-10-CM

## 2023-09-07 PROBLEM — R74.8 ELEVATED LIVER ENZYMES: Status: ACTIVE | Noted: 2022-09-10

## 2023-09-07 PROBLEM — G89.29 CHRONIC LEFT HIP PAIN: Status: ACTIVE | Noted: 2022-04-11

## 2023-09-07 PROBLEM — R56.9 SEIZURES (HCC): Status: ACTIVE | Noted: 2021-05-15

## 2023-09-07 PROBLEM — F41.0 PANIC ATTACKS: Status: ACTIVE | Noted: 2023-09-07

## 2023-09-07 PROBLEM — E66.01 OBESITY, MORBID (HCC): Status: RESOLVED | Noted: 2018-05-14 | Resolved: 2023-09-07

## 2023-09-07 PROBLEM — M25.512 CHRONIC LEFT SHOULDER PAIN: Status: ACTIVE | Noted: 2022-04-11

## 2023-09-07 PROBLEM — G89.29 CHRONIC LEFT SHOULDER PAIN: Status: ACTIVE | Noted: 2022-04-11

## 2023-09-07 PROBLEM — H81.8X9 DEFICIT OF VESTIBULO-OCULAR REFLEX: Status: ACTIVE | Noted: 2022-07-06

## 2023-09-07 PROBLEM — M25.552 CHRONIC LEFT HIP PAIN: Status: ACTIVE | Noted: 2022-04-11

## 2023-09-07 LAB
ALBUMIN SERPL-MCNC: 4 G/DL (ref 3.5–5)
ALBUMIN/GLOB SERPL: 1 (ref 1.1–2.2)
ALP SERPL-CCNC: 66 U/L (ref 45–117)
ALT SERPL-CCNC: 20 U/L (ref 12–78)
ANION GAP SERPL CALC-SCNC: 12 MMOL/L (ref 5–15)
AST SERPL-CCNC: 14 U/L (ref 15–37)
BASOPHILS # BLD: 0 K/UL (ref 0–0.1)
BASOPHILS NFR BLD: 1 % (ref 0–1)
BILIRUB SERPL-MCNC: 0.4 MG/DL (ref 0.2–1)
BUN SERPL-MCNC: 9 MG/DL (ref 6–20)
BUN/CREAT SERPL: 11 (ref 12–20)
CALCIUM SERPL-MCNC: 9.2 MG/DL (ref 8.5–10.1)
CHLORIDE SERPL-SCNC: 100 MMOL/L (ref 97–108)
CO2 SERPL-SCNC: 25 MMOL/L (ref 21–32)
CREAT SERPL-MCNC: 0.79 MG/DL (ref 0.55–1.02)
DIFFERENTIAL METHOD BLD: ABNORMAL
EOSINOPHIL # BLD: 0.1 K/UL (ref 0–0.4)
EOSINOPHIL NFR BLD: 2 % (ref 0–7)
ERYTHROCYTE [DISTWIDTH] IN BLOOD BY AUTOMATED COUNT: 15 % (ref 11.5–14.5)
GLOBULIN SER CALC-MCNC: 4.1 G/DL (ref 2–4)
GLUCOSE SERPL-MCNC: 79 MG/DL (ref 65–100)
HCT VFR BLD AUTO: 38.3 % (ref 35–47)
HGB BLD-MCNC: 12.9 G/DL (ref 11.5–16)
IMM GRANULOCYTES # BLD AUTO: 0 K/UL (ref 0–0.04)
IMM GRANULOCYTES NFR BLD AUTO: 0 % (ref 0–0.5)
LYMPHOCYTES # BLD: 1.8 K/UL (ref 0.8–3.5)
LYMPHOCYTES NFR BLD: 43 % (ref 12–49)
MCH RBC QN AUTO: 27.6 PG (ref 26–34)
MCHC RBC AUTO-ENTMCNC: 33.7 G/DL (ref 30–36.5)
MCV RBC AUTO: 81.8 FL (ref 80–99)
MONOCYTES # BLD: 0.4 K/UL (ref 0–1)
MONOCYTES NFR BLD: 9 % (ref 5–13)
NEUTS SEG # BLD: 1.9 K/UL (ref 1.8–8)
NEUTS SEG NFR BLD: 45 % (ref 32–75)
NRBC # BLD: 0 K/UL (ref 0–0.01)
NRBC BLD-RTO: 0 PER 100 WBC
PLATELET # BLD AUTO: 265 K/UL (ref 150–400)
PMV BLD AUTO: 9.9 FL (ref 8.9–12.9)
POTASSIUM SERPL-SCNC: 3.5 MMOL/L (ref 3.5–5.1)
PROT SERPL-MCNC: 8.1 G/DL (ref 6.4–8.2)
RBC # BLD AUTO: 4.68 M/UL (ref 3.8–5.2)
SODIUM SERPL-SCNC: 137 MMOL/L (ref 136–145)
TROPONIN I SERPL HS-MCNC: 9 NG/L (ref 0–51)
WBC # BLD AUTO: 4.2 K/UL (ref 3.6–11)

## 2023-09-07 PROCEDURE — 85025 COMPLETE CBC W/AUTO DIFF WBC: CPT

## 2023-09-07 PROCEDURE — 36415 COLL VENOUS BLD VENIPUNCTURE: CPT

## 2023-09-07 PROCEDURE — 3077F SYST BP >= 140 MM HG: CPT | Performed by: OBSTETRICS & GYNECOLOGY

## 2023-09-07 PROCEDURE — 3079F DIAST BP 80-89 MM HG: CPT | Performed by: OBSTETRICS & GYNECOLOGY

## 2023-09-07 PROCEDURE — 84484 ASSAY OF TROPONIN QUANT: CPT

## 2023-09-07 PROCEDURE — 99285 EMERGENCY DEPT VISIT HI MDM: CPT

## 2023-09-07 PROCEDURE — 71046 X-RAY EXAM CHEST 2 VIEWS: CPT

## 2023-09-07 PROCEDURE — 99385 PREV VISIT NEW AGE 18-39: CPT | Performed by: OBSTETRICS & GYNECOLOGY

## 2023-09-07 PROCEDURE — 80053 COMPREHEN METABOLIC PANEL: CPT

## 2023-09-07 RX ORDER — LURASIDONE HYDROCHLORIDE 20 MG/1
20 TABLET, FILM COATED ORAL
Qty: 30 TABLET | Refills: 0 | Status: SHIPPED | OUTPATIENT
Start: 2023-09-07 | End: 2023-10-07

## 2023-09-07 RX ORDER — TRIAMCINOLONE ACETONIDE 0.25 MG/G
CREAM TOPICAL EVERY 4 HOURS PRN
COMMUNITY

## 2023-09-07 RX ORDER — METHOCARBAMOL 750 MG/1
750 TABLET, FILM COATED ORAL 4 TIMES DAILY
Qty: 40 TABLET | Refills: 0 | Status: SHIPPED | OUTPATIENT
Start: 2023-09-07 | End: 2023-09-17

## 2023-09-07 RX ORDER — NYSTATIN 100000 U/G
CREAM TOPICAL 2 TIMES DAILY
COMMUNITY

## 2023-09-07 RX ORDER — AMLODIPINE BESYLATE 5 MG/1
5 TABLET ORAL DAILY
COMMUNITY
Start: 2023-05-31

## 2023-09-07 RX ORDER — LURASIDONE HYDROCHLORIDE 20 MG/1
20 TABLET, FILM COATED ORAL
COMMUNITY
End: 2023-09-07 | Stop reason: SDUPTHER

## 2023-09-07 ASSESSMENT — PAIN DESCRIPTION - DESCRIPTORS: DESCRIPTORS: THROBBING

## 2023-09-07 ASSESSMENT — PAIN DESCRIPTION - PAIN TYPE: TYPE: ACUTE PAIN

## 2023-09-07 ASSESSMENT — PAIN - FUNCTIONAL ASSESSMENT: PAIN_FUNCTIONAL_ASSESSMENT: 0-10

## 2023-09-07 ASSESSMENT — PAIN SCALES - GENERAL: PAINLEVEL_OUTOF10: 10

## 2023-09-07 ASSESSMENT — PAIN DESCRIPTION - LOCATION: LOCATION: CHEST

## 2023-09-07 ASSESSMENT — PAIN DESCRIPTION - ORIENTATION: ORIENTATION: MID

## 2023-09-07 NOTE — PROGRESS NOTES
following from [facility]: Outside name: doxepin (SINEquan) 50 mg capsule (Patient not taking: Reported on 9/7/2023)      ergocalciferol (ERGOCALCIFEROL) 1.25 MG (28572 UT) capsule ceived the following from [address] - [facility]: Outside name: ergocalciferol (ERGOCALCIFEROL) 1,250 mcg (50,000 unit) capsule (Patient not taking: Reported on 9/7/2023)      loratadine (CLARITIN) 10 MG capsule Take 10 mg by mouth daily (Patient not taking: Reported on 9/7/2023)      methocarbamol (ROBAXIN) 750 MG tablet Take 750 mg by mouth 3 times daily (Patient not taking: Reported on 9/7/2023)      ondansetron (ZOFRAN-ODT) 4 MG disintegrating tablet Take 4 mg by mouth every 8 hours as needed (Patient not taking: Reported on 9/7/2023)      promethazine (PHENERGAN) 25 MG suppository Place 25 mg rectally every 6 hours as needed (Patient not taking: Reported on 9/7/2023)       No current facility-administered medications for this visit. Allergies: Aspirin, Hydrocodone-acetaminophen, Cyclobenzaprine, Fd&c blue #1 (brilliant blue fcf), Ibuprofen, Ketorolac tromethamine, Penicillins, and Tramadol     Tobacco History:  reports that she has been smoking cigarettes. She has been smoking an average of .5 packs per day. She has never used smokeless tobacco.  Alcohol Abuse:  reports current alcohol use. Drug Abuse:  reports current drug use. Drug: Marijuana [employer]).     Family Medical/Cancer History:   Family History   Problem Relation Age of Onset    Heart Disease Maternal Grandmother     Hypertension Maternal Grandmother     Hypertension Sister     Cancer Maternal Uncle     Hypertension Father     Dementia Maternal Aunt     Dementia Paternal Aunt     Other Paternal Uncle         aneurysm    Parkinson's Disease Maternal Grandfather     Heart Disease Mother     Diabetes Mother     Hypertension Mother     Diabetes Maternal Grandmother         Review of Systems - History obtained from the patient    Constitutional: negative

## 2023-09-07 NOTE — ED NOTES
Discharge instructions were given to the patient by Lesia Walsh RN and Orlin Baez Dr.. The patient left the Emergency Department  in [facility] with 1 prescription(s). The patient verbalized understanding of instructions and follow up care.        Keyur Jonas RN  09/07/23 8699

## 2023-09-07 NOTE — ED PROVIDER NOTES
[facility] EMERGENCY DEPT  EMERGENCY DEPARTMENT ENCOUNTER    Patient Name: Lilian Mcadams  MRN: [id number]  YOB: 1985  Provider: Omayra Mattson MD  PCP: Shelby Kraft MD   Time/Date of evaluation: 4:04 PM EDT on 9/7/23    History of Presenting Illness     Chief Complaint   Patient presents with    Chest Pain     Per pt reports mid sternal chest pain that started today, +sob after having labs drawn. History Provided by: Patient   History is limited by: Nothing    HISTORY (Narrative): Lilian Mcadams is a 45 y.o. female with a PMHX of anxiety, osteoarthritis, bipolar disorder, COPD, DVTs, depression, bipolar disorder, GERD, paranoid schizophrenia, and epilepsy  who presents to the emergency department (room 9)  ambulatory from outpatient OB/GYN  C/O substernal chest pain and shortness of breath that started this morning. Patient went to OB/GYN due to dysfunctional uterine bleeding. Patient had blood work done and shortly after having her labs checked, started developing the shortness of breath and chest pain. Patient denies taking any medications for the symptoms. She denies any fevers, chills, or cough. Nursing Notes were all reviewed and agreed with or any disagreements were addressed in the HPI.     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

## 2023-09-09 LAB
ALBUMIN SERPL-MCNC: 5.1 G/DL (ref 3.9–4.9)
ALBUMIN/GLOB SERPL: 1.8 {RATIO} (ref 1.2–2.2)
ALP SERPL-CCNC: 73 IU/L (ref 44–121)
ALT SERPL-CCNC: 15 IU/L (ref 0–32)
AST SERPL-CCNC: 14 IU/L (ref 0–40)
BILIRUB SERPL-MCNC: 0.3 MG/DL (ref 0–1.2)
BUN SERPL-MCNC: 9 MG/DL (ref 6–20)
BUN/CREAT SERPL: 12 (ref 9–23)
CALCIUM SERPL-MCNC: 9.7 MG/DL (ref 8.7–10.2)
CHLORIDE SERPL-SCNC: 98 MMOL/L (ref 96–106)
CO2 SERPL-SCNC: 21 MMOL/L (ref 20–29)
CREAT SERPL-MCNC: 0.78 MG/DL (ref 0.57–1)
DHEA-S SERPL-MCNC: 248 UG/DL (ref 57.3–279.2)
EGFRCR SERPLBLD CKD-EPI 2021: 100 ML/MIN/1.73
ERYTHROCYTE [DISTWIDTH] IN BLOOD BY AUTOMATED COUNT: 14.5 % (ref 11.7–15.4)
FSH SERPL-ACNC: 5.7 MIU/ML
GLOBULIN SER CALC-MCNC: 2.8 G/DL (ref 1.5–4.5)
GLUCOSE SERPL-MCNC: 72 MG/DL (ref 70–99)
HBA1C MFR BLD: 5.7 % (ref 4.8–5.6)
HBV SURFACE AG SERPL QL IA: NEGATIVE
HCT VFR BLD AUTO: 38.9 % (ref 34–46.6)
HCV IGG SERPL QL IA: NON REACTIVE
HGB BLD-MCNC: 12.4 G/DL (ref 11.1–15.9)
HIV 1+2 AB+HIV1 P24 AG SERPL QL IA: NON REACTIVE
HSV1 IGG SER IA-ACNC: 42.9 INDEX (ref 0–0.9)
HSV2 IGG SER IA-ACNC: >23.6 INDEX (ref 0–0.9)
LH SERPL-ACNC: 7.3 MIU/ML
MCH RBC QN AUTO: 27.3 PG (ref 26.6–33)
MCHC RBC AUTO-ENTMCNC: 31.9 G/DL (ref 31.5–35.7)
MCV RBC AUTO: 86 FL (ref 79–97)
PLATELET # BLD AUTO: 294 X10E3/UL (ref 150–450)
POTASSIUM SERPL-SCNC: 3.8 MMOL/L (ref 3.5–5.2)
PROLACTIN SERPL-MCNC: 6.4 NG/ML (ref 4.8–23.3)
PROT SERPL-MCNC: 7.9 G/DL (ref 6–8.5)
RBC # BLD AUTO: 4.55 X10E6/UL (ref 3.77–5.28)
SODIUM SERPL-SCNC: 137 MMOL/L (ref 134–144)
TREPONEMA PALLIDUM IGG+IGM AB [PRESENCE] IN SERUM OR PLASMA BY IMMUNOASSAY: NON REACTIVE
TSH SERPL DL<=0.005 MIU/L-ACNC: 0.76 UIU/ML (ref 0.45–4.5)
WBC # BLD AUTO: 3.5 X10E3/UL (ref 3.4–10.8)

## 2023-09-11 LAB
17OHP SERPL-MCNC: 36 NG/DL
EKG ATRIAL RATE: 64 BPM
EKG DIAGNOSIS: NORMAL
EKG P AXIS: 43 DEGREES
EKG P-R INTERVAL: 160 MS
EKG Q-T INTERVAL: 424 MS
EKG QRS DURATION: 84 MS
EKG QTC CALCULATION (BAZETT): 437 MS
EKG R AXIS: 26 DEGREES
EKG T AXIS: 19 DEGREES
EKG VENTRICULAR RATE: 64 BPM

## 2023-09-13 ENCOUNTER — TELEPHONE (OUTPATIENT)
Age: 38
End: 2023-09-13

## 2023-09-13 LAB
C TRACH RRNA CVX QL NAA+PROBE: NEGATIVE
CYTOLOGIST CVX/VAG CYTO: NORMAL
CYTOLOGY CVX/VAG DOC CYTO: NORMAL
CYTOLOGY CVX/VAG DOC THIN PREP: NORMAL
DX ICD CODE: NORMAL
HPV GENOTYPE REFLEX: NORMAL
HPV I/H RISK 4 DNA CVX QL PROBE+SIG AMP: NEGATIVE
Lab: NORMAL
N GONORRHOEA RRNA CVX QL NAA+PROBE: NEGATIVE
OTHER STN SPEC: NORMAL
STAT OF ADQ CVX/VAG CYTO-IMP: NORMAL

## 2023-09-14 ENCOUNTER — TELEPHONE (OUTPATIENT)
Age: 38
End: 2023-09-14

## 2023-09-14 NOTE — TELEPHONE ENCOUNTER
Spoke with patient. Verified patient with two patient identifiers. Last saw Dr. Dana Gonzalez 1-. States she does not have transportation to get here. States she has had increasing seizures for months. States sometimes she is alert and is aware she is having them, and sometimes she is not. States sometimes she twitches with them and sometimes she does not. States I am irritating her with all my questions as she cannot tell me if she is having one a day or many a day. Cannot tell me how long they last, just that she foams at the mouth with them. States she does not want to go to the ER as they are not neurologist, cannot help her, it would only cost her money, and this will irritate her if we suggest it. Doesn't feel El Swenson is helping. Has appt with Olegario Mcqueen, RON [phone]. Pls advise.

## 2023-09-14 NOTE — TELEPHONE ENCOUNTER
[facility] to call us. Let us know if she wants Dr. Hammad Lackey phone number. JENNIE Viveros - RON sent to Sweta Brenner LPN  Caller: Unspecified (Today,  1:29 PM)    I believe the patient used to see Dr Finn Santos, can you give her his information b/c maybe he is able to see her sooner.    EN

## 2023-09-14 NOTE — TELEPHONE ENCOUNTER
Pt called to schedule appt. Stated that she is having \"silent' seizures. She is aware and alert but is foaming at mouth. Stated the Oxcarbazepine 300 mg is not helping. Please call Pt to discuss next steps.  [phone]

## 2023-09-15 LAB
TESTOST FREE SERPL-MCNC: 2.3 PG/ML (ref 0–4.2)
TESTOST SERPL-MCNC: 24 NG/DL (ref 8–60)

## 2023-09-19 ENCOUNTER — TELEPHONE (OUTPATIENT)
Age: 38
End: 2023-09-19

## 2023-09-19 NOTE — TELEPHONE ENCOUNTER
Pt never returned our call. JENNIE Lam NP sent to Josias Rodeo, LPN  Caller: Unspecified (5 days ago,  1:29 PM)  I believe the patient used to see Dr Dede Benoit, can you give her his information b/c maybe he is able to see her sooner. [facility] I was calling her back again. Gave her Dr Flavia Quinones phone number, advising he may be able to see her sooner as he has seen her in the past.    Advised her to call me back to discuss all. EN           Previous Messages    Pt having breakthrough seizures , foaming at mouth (Seizures increasing for months, does not know how many she is having)  (Newest Message First)  View All Conversations on this Encounter  You 5 days ago     JUAN CARLOS  [facility] to call us. Let us know if she wants Dr. Flavia Quinones phone number. JENNIE Lam NP sent to Josias Rodeo, LPN  Caller: Unspecified (Today,  1:29 PM)     I believe the patient used to see Dr Dede Benoit, can you give her his information b/c maybe he is able to see her sooner.    EN

## 2024-01-24 ENCOUNTER — APPOINTMENT (OUTPATIENT)
Facility: HOSPITAL | Age: 39
End: 2024-01-24
Payer: MEDICAID

## 2024-01-24 ENCOUNTER — HOSPITAL ENCOUNTER (EMERGENCY)
Facility: HOSPITAL | Age: 39
Discharge: HOME | End: 2024-01-24
Payer: MEDICAID

## 2024-01-24 VITALS
HEIGHT: 63 IN | SYSTOLIC BLOOD PRESSURE: 168 MMHG | WEIGHT: 181.5 LBS | RESPIRATION RATE: 23 BRPM | TEMPERATURE: 98.2 F | HEART RATE: 68 BPM | DIASTOLIC BLOOD PRESSURE: 95 MMHG | OXYGEN SATURATION: 100 % | BODY MASS INDEX: 32.16 KG/M2

## 2024-01-24 DIAGNOSIS — R07.9 RIGHT-SIDED CHEST PAIN: Primary | ICD-10-CM

## 2024-01-24 DIAGNOSIS — Z20.822 EXPOSURE TO CONFIRMED CASE OF COVID-19: ICD-10-CM

## 2024-01-24 DIAGNOSIS — I10 PRIMARY HYPERTENSION: ICD-10-CM

## 2024-01-24 DIAGNOSIS — Z76.0 ENCOUNTER FOR MEDICATION REFILL: ICD-10-CM

## 2024-01-24 LAB
ALBUMIN SERPL-MCNC: 3.6 G/DL (ref 3.5–5)
ALBUMIN/GLOB SERPL: 0.9 (ref 1.1–2.2)
ALP SERPL-CCNC: 61 U/L (ref 45–117)
ALT SERPL-CCNC: 18 U/L (ref 12–78)
ANION GAP SERPL CALC-SCNC: 13 MMOL/L (ref 5–15)
AST SERPL-CCNC: 13 U/L (ref 15–37)
BASOPHILS # BLD: 0 K/UL (ref 0–0.1)
BASOPHILS NFR BLD: 1 % (ref 0–1)
BILIRUB SERPL-MCNC: 0.2 MG/DL (ref 0.2–1)
BUN SERPL-MCNC: 13 MG/DL (ref 6–20)
BUN/CREAT SERPL: 15 (ref 12–20)
CALCIUM SERPL-MCNC: 8.7 MG/DL (ref 8.5–10.1)
CHLORIDE SERPL-SCNC: 101 MMOL/L (ref 97–108)
CO2 SERPL-SCNC: 28 MMOL/L (ref 21–32)
COMMENT:: NORMAL
CREAT SERPL-MCNC: 0.87 MG/DL (ref 0.55–1.02)
DIFFERENTIAL METHOD BLD: ABNORMAL
EOSINOPHIL # BLD: 0.2 K/UL (ref 0–0.4)
EOSINOPHIL NFR BLD: 4 % (ref 0–7)
ERYTHROCYTE [DISTWIDTH] IN BLOOD BY AUTOMATED COUNT: 15 % (ref 11.5–14.5)
GLOBULIN SER CALC-MCNC: 3.9 G/DL (ref 2–4)
GLUCOSE SERPL-MCNC: 74 MG/DL (ref 65–100)
HCT VFR BLD AUTO: 35.9 % (ref 35–47)
HGB BLD-MCNC: 11.8 G/DL (ref 11.5–16)
IMM GRANULOCYTES # BLD AUTO: 0 K/UL (ref 0–0.04)
IMM GRANULOCYTES NFR BLD AUTO: 1 % (ref 0–0.5)
LYMPHOCYTES # BLD: 1.9 K/UL (ref 0.8–3.5)
LYMPHOCYTES NFR BLD: 48 % (ref 12–49)
MCH RBC QN AUTO: 27.5 PG (ref 26–34)
MCHC RBC AUTO-ENTMCNC: 32.9 G/DL (ref 30–36.5)
MCV RBC AUTO: 83.7 FL (ref 80–99)
MONOCYTES # BLD: 0.3 K/UL (ref 0–1)
MONOCYTES NFR BLD: 8 % (ref 5–13)
NEUTS SEG # BLD: 1.5 K/UL (ref 1.8–8)
NEUTS SEG NFR BLD: 38 % (ref 32–75)
NRBC # BLD: 0 K/UL (ref 0–0.01)
NRBC BLD-RTO: 0 PER 100 WBC
PLATELET # BLD AUTO: 333 K/UL (ref 150–400)
PMV BLD AUTO: 9.5 FL (ref 8.9–12.9)
POTASSIUM SERPL-SCNC: 3.7 MMOL/L (ref 3.5–5.1)
PROT SERPL-MCNC: 7.5 G/DL (ref 6.4–8.2)
RBC # BLD AUTO: 4.29 M/UL (ref 3.8–5.2)
SODIUM SERPL-SCNC: 142 MMOL/L (ref 136–145)
SPECIMEN HOLD: NORMAL
TROPONIN I SERPL HS-MCNC: 5 NG/L (ref 0–51)
WBC # BLD AUTO: 3.9 K/UL (ref 3.6–11)

## 2024-01-24 PROCEDURE — 84484 ASSAY OF TROPONIN QUANT: CPT

## 2024-01-24 PROCEDURE — 93005 ELECTROCARDIOGRAM TRACING: CPT | Performed by: EMERGENCY MEDICINE

## 2024-01-24 PROCEDURE — 6360000002 HC RX W HCPCS: Performed by: PHYSICIAN ASSISTANT

## 2024-01-24 PROCEDURE — 85025 COMPLETE CBC W/AUTO DIFF WBC: CPT

## 2024-01-24 PROCEDURE — 87635 SARS-COV-2 COVID-19 AMP PRB: CPT

## 2024-01-24 PROCEDURE — 99285 EMERGENCY DEPT VISIT HI MDM: CPT

## 2024-01-24 PROCEDURE — 6370000000 HC RX 637 (ALT 250 FOR IP): Performed by: PHYSICIAN ASSISTANT

## 2024-01-24 PROCEDURE — 36415 COLL VENOUS BLD VENIPUNCTURE: CPT

## 2024-01-24 PROCEDURE — 96374 THER/PROPH/DIAG INJ IV PUSH: CPT

## 2024-01-24 PROCEDURE — 71046 X-RAY EXAM CHEST 2 VIEWS: CPT

## 2024-01-24 PROCEDURE — 80053 COMPREHEN METABOLIC PANEL: CPT

## 2024-01-24 RX ORDER — AMLODIPINE BESYLATE 5 MG/1
5 TABLET ORAL DAILY
Qty: 30 TABLET | Refills: 0 | Status: SHIPPED | OUTPATIENT
Start: 2024-01-24 | End: 2024-01-25 | Stop reason: SDUPTHER

## 2024-01-24 RX ORDER — LIDOCAINE 50 MG/G
1 PATCH TOPICAL DAILY PRN
Qty: 10 PATCH | Refills: 0 | Status: SHIPPED | OUTPATIENT
Start: 2024-01-24 | End: 2024-02-03

## 2024-01-24 RX ORDER — METHYLPREDNISOLONE 4 MG/1
TABLET ORAL
Qty: 21 TABLET | Refills: 0 | Status: SHIPPED | OUTPATIENT
Start: 2024-01-24 | End: 2024-01-30

## 2024-01-24 RX ORDER — LIDOCAINE 4 G/G
1 PATCH TOPICAL
Status: DISCONTINUED | OUTPATIENT
Start: 2024-01-24 | End: 2024-01-24 | Stop reason: HOSPADM

## 2024-01-24 RX ORDER — AMLODIPINE BESYLATE 5 MG/1
5 TABLET ORAL
Status: COMPLETED | OUTPATIENT
Start: 2024-01-24 | End: 2024-01-24

## 2024-01-24 RX ORDER — HYDROCHLOROTHIAZIDE 12.5 MG/1
12.5 TABLET ORAL DAILY
Qty: 30 TABLET | Refills: 0 | Status: SHIPPED | OUTPATIENT
Start: 2024-01-24 | End: 2024-01-25 | Stop reason: SDUPTHER

## 2024-01-24 RX ORDER — DEXAMETHASONE SODIUM PHOSPHATE 10 MG/ML
10 INJECTION INTRAMUSCULAR; INTRAVENOUS ONCE
Status: COMPLETED | OUTPATIENT
Start: 2024-01-24 | End: 2024-01-24

## 2024-01-24 RX ADMIN — AMLODIPINE BESYLATE 5 MG: 5 TABLET ORAL at 17:03

## 2024-01-24 RX ADMIN — DEXAMETHASONE SODIUM PHOSPHATE 10 MG: 10 INJECTION INTRAMUSCULAR; INTRAVENOUS at 16:40

## 2024-01-24 ASSESSMENT — PAIN - FUNCTIONAL ASSESSMENT: PAIN_FUNCTIONAL_ASSESSMENT: 0-10

## 2024-01-24 ASSESSMENT — HEART SCORE: ECG: 0

## 2024-01-24 ASSESSMENT — PAIN SCALES - GENERAL: PAINLEVEL_OUTOF10: 10

## 2024-01-24 ASSESSMENT — PAIN DESCRIPTION - DESCRIPTORS: DESCRIPTORS: ACHING

## 2024-01-24 ASSESSMENT — PAIN DESCRIPTION - LOCATION: LOCATION: CHEST

## 2024-01-24 ASSESSMENT — PAIN DESCRIPTION - ORIENTATION: ORIENTATION: RIGHT

## 2024-01-24 NOTE — CARE COORDINATION
CM received consult from ED to assist pt with scheduling PCP follow up. Pt presented to ED today for chest pain.     CM met with pt at bedside, introduced self and role. Pt confirmed her address, phone, and emergency contact. Pt stated that Zaida Bentley is still her PCP; estimates her last office visit was summer 2023. CM offered to schedule for pt; Dr. Bentley office told CM they will call pt back to schedule her appt.     Pt also asked for help scheduling w/ her psychiatrist. CM called the office for Dr. Trinidad and scheduled pt for a virtual appt tomorrow, 1/25/24 at 5pm.     CM added these updates to her AVS.     Eleonora Hung, BELEN, CM  [phone]

## 2024-01-24 NOTE — ED NOTES
Pt given discharge papers. 4 E prescriptions sent to patients pharmacy. Pt educated on discharge papers and prescriptions. Pt instructed to follow up with PCP for BP management. Pt verbalizes understanding and denies any further questions. Pt ambulated to waiting room with steady gait, alert and oriented x4, PIV removed.

## 2024-01-24 NOTE — ED TRIAGE NOTES
Pt reports history of stroke, right sided weakness. Pt reports right sided chest pain x1 month that radiates to her back. Pt states symptoms worsened with pain this am and its crushing chest pain. Pt denies numbness or tingling, reports worsening chest pain upon inhalation

## 2024-01-24 NOTE — ED PROVIDER NOTES
PCP.  DDx: Musculoskeletal chest pain, atypical chest pain, electrolyte imbalance, low suspicion for ACS.  Will get basic labs, EKG and chest x-ray to start.  Pt states she has been clean from cocaine for \"awhile.\"    ED Course as of 01/24/24 1706   Wed Jan 24, 2024   1429 ED EKG interpretation:  Rhythm: normal sinus rhythm; and regular . Rate (approx.): 70; Axis: normal; P wave: normal; QRS interval: normal ; ST/T wave: normal; Other findings: normal. This EKG was interpreted by Adán Barone MD,ED Provider.  [MS]   1642 Discussed resulted labs with pt.  Suspect symptoms are mm in nature.  Decadron given for any muscle inflammation as patient has a history of NSAID allergies.  Will also give a lidocaine patch and a dose of her normal blood pressure medications prior to discharge per patient's request.  She would like her medicine sent to the pharmacy here but they are about to close.  Will give follow up with cardiology .  She is also requesting a COVID test stating she just got a call from her sister he tested positive [AH]      ED Course User Index  [AH] Aguila Mena PA-C  [MS] Adán Barone MD     I utilized an evidence-based risk rating tool (CMT) along with my training and experience to weigh the risk of discharge against the risks of further testing, imaging, or hospitalization. At this time I estimate the risks of additional testing, imaging, or hospitalization to be equal to or greater than the risk of discharge. I discussed my risk assessment with the patient and the patient consents to the risk of discharge as well as the risk of uncertainty in estimating outcomes.    The patient's HEART Score is 1. In rare cases, I give patients with HEART Score of 4 or lower the option of discharge, but only when they meet criteria for \"Low 4,\" meaning that HST was used, and the 4 is not from a highly suspicious story, highly suspicious EKG, or positive cardiac enzymes. In these selected cases, the

## 2024-01-25 ENCOUNTER — OFFICE VISIT (OUTPATIENT)
Age: 39
End: 2024-01-25
Payer: MEDICAID

## 2024-01-25 VITALS
OXYGEN SATURATION: 98 % | HEIGHT: 63 IN | DIASTOLIC BLOOD PRESSURE: 72 MMHG | SYSTOLIC BLOOD PRESSURE: 140 MMHG | HEART RATE: 87 BPM | WEIGHT: 181 LBS | BODY MASS INDEX: 32.07 KG/M2

## 2024-01-25 DIAGNOSIS — I10 HYPERTENSION, UNCONTROLLED: ICD-10-CM

## 2024-01-25 DIAGNOSIS — E11.9 DIABETES MELLITUS TYPE 2, DIET-CONTROLLED (HCC): ICD-10-CM

## 2024-01-25 DIAGNOSIS — R00.2 PALPITATIONS: ICD-10-CM

## 2024-01-25 DIAGNOSIS — R07.89 CHEST PAIN, ATYPICAL: ICD-10-CM

## 2024-01-25 DIAGNOSIS — F19.10 POLYSUBSTANCE ABUSE (HCC): ICD-10-CM

## 2024-01-25 DIAGNOSIS — Z91.199 NONCOMPLIANCE: ICD-10-CM

## 2024-01-25 DIAGNOSIS — I10 HYPERTENSION, UNCONTROLLED: Primary | ICD-10-CM

## 2024-01-25 LAB
SARS-COV-2 RNA RESP QL NAA+PROBE: NOT DETECTED
SOURCE: NORMAL

## 2024-01-25 PROCEDURE — 3077F SYST BP >= 140 MM HG: CPT | Performed by: NURSE PRACTITIONER

## 2024-01-25 PROCEDURE — 99204 OFFICE O/P NEW MOD 45 MIN: CPT | Performed by: NURSE PRACTITIONER

## 2024-01-25 PROCEDURE — 3078F DIAST BP <80 MM HG: CPT | Performed by: NURSE PRACTITIONER

## 2024-01-25 RX ORDER — HYDROCHLOROTHIAZIDE 12.5 MG/1
12.5 TABLET ORAL DAILY
Qty: 30 TABLET | Refills: 3 | Status: SHIPPED | OUTPATIENT
Start: 2024-01-25

## 2024-01-25 RX ORDER — AMLODIPINE BESYLATE 5 MG/1
5 TABLET ORAL DAILY
Qty: 30 TABLET | Refills: 3 | Status: SHIPPED | OUTPATIENT
Start: 2024-01-25

## 2024-01-25 RX ORDER — IBUPROFEN 800 MG/1
TABLET ORAL
COMMUNITY
Start: 2024-01-12

## 2024-01-25 RX ORDER — TIOTROPIUM BROMIDE 18 UG/1
CAPSULE ORAL; RESPIRATORY (INHALATION)
COMMUNITY
Start: 2024-01-08

## 2024-01-25 RX ORDER — CHLORHEXIDINE GLUCONATE ORAL RINSE 1.2 MG/ML
SOLUTION DENTAL
COMMUNITY
Start: 2024-01-22
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
## 2024-01-25 NOTE — PATIENT INSTRUCTIONS
I have refilled your amlodipine and HCTZ.  We will do a 1 week monitor to see if you are having any abnormal heart rhythms with the fluttering you are having.  The chest pain you are having does not concern me for heart related pains.  I will also check your hemoglobin A1c, cholesterol, and thyroid and we will reach out to you when the results are available.    Lets plan on a 3-month follow-up to recheck your blood pressure and see how you are doing.  Please take both medications daily exactly as prescribed to help prevent large fluctuations between your blood pressure readings.

## 2024-01-26 LAB
CHOLEST SERPL-MCNC: 193 MG/DL
EKG ATRIAL RATE: 70 BPM
EKG DIAGNOSIS: NORMAL
EKG P AXIS: 36 DEGREES
EKG P-R INTERVAL: 152 MS
EKG Q-T INTERVAL: 390 MS
EKG QRS DURATION: 80 MS
EKG QTC CALCULATION (BAZETT): 421 MS
EKG R AXIS: 20 DEGREES
EKG T AXIS: 22 DEGREES
EKG VENTRICULAR RATE: 70 BPM
EST. AVERAGE GLUCOSE BLD GHB EST-MCNC: 103 MG/DL
HBA1C MFR BLD: 5.2 % (ref 4–5.6)
HDLC SERPL-MCNC: 68 MG/DL
HDLC SERPL: 2.8 (ref 0–5)
LDLC SERPL CALC-MCNC: 109.2 MG/DL (ref 0–100)
TRIGL SERPL-MCNC: 79 MG/DL
VLDLC SERPL CALC-MCNC: 15.8 MG/DL

## 2024-01-26 PROCEDURE — 93010 ELECTROCARDIOGRAM REPORT: CPT | Performed by: SPECIALIST

## 2024-01-29 ENCOUNTER — TELEPHONE (OUTPATIENT)
Age: 39
End: 2024-01-29

## 2024-01-29 LAB
Lab: ABNORMAL
T3 FREE: 1.8 PG/ML (ref 2–4.4)
T4 FREE SERPL-MCNC: 0.92 NG/DL (ref 0.82–1.77)
TSH SERPL DL<=0.05 MIU/L-ACNC: 0.42 UIU/ML (ref 0.45–4.5)

## 2024-01-29 NOTE — TELEPHONE ENCOUNTER
Results discussed with patient:    Hemoglobin A1c is excellent.  No changes to therapy.  Cholesterol levels are at goal.  TSH is mildly suppressed with normal free T4 and slightly low free T3.  Consistent with subclinical hyperthyroidism.  No changes to current therapy.  Will need repeat thyroid studies within 6 to 12 months.

## 2024-02-02 ENCOUNTER — HOSPITAL ENCOUNTER (OUTPATIENT)
Facility: HOSPITAL | Age: 39
End: 2024-02-02
Payer: MEDICAID

## 2024-02-02 DIAGNOSIS — R00.2 PALPITATIONS: ICD-10-CM

## 2024-02-02 PROCEDURE — 93270 REMOTE 30 DAY ECG REV/REPORT: CPT

## 2024-02-14 ENCOUNTER — OFFICE VISIT (OUTPATIENT)
Age: 39
End: 2024-02-14
Payer: MEDICARE

## 2024-02-14 VITALS — SYSTOLIC BLOOD PRESSURE: 140 MMHG | WEIGHT: 181 LBS | BODY MASS INDEX: 32.06 KG/M2 | DIASTOLIC BLOOD PRESSURE: 88 MMHG

## 2024-02-14 DIAGNOSIS — N92.0 MENORRHAGIA WITH REGULAR CYCLE: Primary | ICD-10-CM

## 2024-02-14 PROBLEM — R44.3 HALLUCINATION: Status: ACTIVE | Noted: 2023-09-18

## 2024-02-14 PROBLEM — R09.1 PLEURITIS: Status: ACTIVE | Noted: 2023-12-15

## 2024-02-14 PROBLEM — F29 PSYCHOSIS (HCC): Status: ACTIVE | Noted: 2023-09-19

## 2024-02-14 PROBLEM — G40.909 SEIZURE DISORDER (HCC): Status: ACTIVE | Noted: 2019-06-23

## 2024-02-14 PROCEDURE — 3079F DIAST BP 80-89 MM HG: CPT | Performed by: OBSTETRICS & GYNECOLOGY

## 2024-02-14 PROCEDURE — 99214 OFFICE O/P EST MOD 30 MIN: CPT | Performed by: OBSTETRICS & GYNECOLOGY

## 2024-02-14 PROCEDURE — 3077F SYST BP >= 140 MM HG: CPT | Performed by: OBSTETRICS & GYNECOLOGY

## 2024-02-14 RX ORDER — ALPRAZOLAM 1 MG/1
1 TABLET ORAL NIGHTLY PRN
COMMUNITY

## 2024-02-14 RX ORDER — ASPIRIN 325 MG
325 TABLET ORAL DAILY
COMMUNITY

## 2024-02-14 RX ORDER — LAMOTRIGINE 150 MG/1
150 TABLET ORAL DAILY
COMMUNITY

## 2024-02-14 RX ORDER — ACETAMINOPHEN AND CODEINE PHOSPHATE 120; 12 MG/5ML; MG/5ML
1 SOLUTION ORAL DAILY
Qty: 90 TABLET | Refills: 4 | Status: SHIPPED | OUTPATIENT
Start: 2024-02-14
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
## 2024-02-14 NOTE — PATIENT INSTRUCTIONS
[employer] offers free information on medications and healthcare cost savings programs including prescription assistance programs, coupons, and discount programs.  Website: https://www.needLaunchGram.org/  Helpline: 587.812.7790    RX Assist  What they offer: Information about free and low-cost medicine programs.  Website: https://www.rxassist.org/    [employer] $4 Prescription Program  What they offer: Prescription Program includes up to a 30-day supply for $4 and a 90-day supply for $10 of some covered generic drugs at commonly prescribed dosages  Website: https://www.[employer]/cp/4-prescriptions/1050318Nvqykldiw  CommonHelp  What they offer: Partnership with the [facility] of . Assist with finding and applying for government funded programs and benefits. You can also update your benefits or report changes through Take5.  Website: https://www.Bizpora/  Phone Number: 833-5callva (900.706.3839)    [employer] [place] [employer]  What they offer: m[employer]icleare is [employer]ak[employer]’s energy assistance program of last resort for anyone who faces financial hardships from unemployment or family crisis.  Phone Number: 933.375.2746  Address: [address] [place] [address]  Website: https://www.Intertwine/[place]/billing/billing-options/energyshRelux    Energy Assistance Programs (EAP) - Department of   What they offer: EAP assists low-income households in meeting their immediate home energy needs.      Website: https://www.Syntarga.[place].gov/benefit/ea/  Available assistance:   Crisis Assistance - Heating Emergencies  Fuel Assistance - Offset Heating Fuel Costs  Cooling Assistance - Applies to Cooling Utility Bills and Equipment  How to apply:  Online:  https://Bizpora/  Call:  [phone]   Paper application:   Print application from https://www.Syntarga.[place].gov/benefit/ea/ and submit to your local Department of         Local

## 2024-03-05 ENCOUNTER — HOSPITAL ENCOUNTER (EMERGENCY)
Facility: HOSPITAL | Age: 39
Discharge: HOME | End: 2024-03-05
Payer: MEDICARE

## 2024-03-05 ENCOUNTER — APPOINTMENT (OUTPATIENT)
Facility: HOSPITAL | Age: 39
End: 2024-03-05
Payer: MEDICARE

## 2024-03-05 VITALS
OXYGEN SATURATION: 98 % | BODY MASS INDEX: 33.3 KG/M2 | WEIGHT: 188 LBS | DIASTOLIC BLOOD PRESSURE: 100 MMHG | TEMPERATURE: 98.6 F | SYSTOLIC BLOOD PRESSURE: 167 MMHG | HEART RATE: 81 BPM | RESPIRATION RATE: 20 BRPM

## 2024-03-05 DIAGNOSIS — J40 BRONCHITIS: Primary | ICD-10-CM

## 2024-03-05 LAB
FLUAV RNA SPEC QL NAA+PROBE: NOT DETECTED
FLUBV RNA SPEC QL NAA+PROBE: NOT DETECTED
HCG UR QL: NEGATIVE
SARS-COV-2 RNA RESP QL NAA+PROBE: NOT DETECTED

## 2024-03-05 PROCEDURE — 81025 URINE PREGNANCY TEST: CPT

## 2024-03-05 PROCEDURE — 99285 EMERGENCY DEPT VISIT HI MDM: CPT

## 2024-03-05 PROCEDURE — 71045 X-RAY EXAM CHEST 1 VIEW: CPT

## 2024-03-05 PROCEDURE — 93005 ELECTROCARDIOGRAM TRACING: CPT | Performed by: EMERGENCY MEDICINE

## 2024-03-05 PROCEDURE — 87636 SARSCOV2 & INF A&B AMP PRB: CPT

## 2024-03-05 RX ORDER — ALBUTEROL SULFATE 90 UG/1
2 AEROSOL, METERED RESPIRATORY (INHALATION) EVERY 4 HOURS PRN
Qty: 18 G | Refills: 0 | Status: SHIPPED | OUTPATIENT
Start: 2024-03-05 | End: 2024-03-07

## 2024-03-05 RX ORDER — ALBUTEROL SULFATE 2.5 MG/3ML
2.5 SOLUTION RESPIRATORY (INHALATION) EVERY 4 HOURS PRN
Qty: 120 EACH | Refills: 0 | Status: SHIPPED | OUTPATIENT
Start: 2024-03-05 | End: 2024-03-07

## 2024-03-05 RX ORDER — CODEINE PHOSPHATE/GUAIFENESIN 10-100MG/5
5 LIQUID (ML) ORAL 3 TIMES DAILY PRN
Qty: 45 ML | Refills: 0 | Status: SHIPPED | OUTPATIENT
Start: 2024-03-05 | End: 2024-03-07

## 2024-03-05 RX ORDER — FLUTICASONE PROPIONATE 50 MCG
2 SPRAY, SUSPENSION (ML) NASAL DAILY
Qty: 16 G | Refills: 0 | Status: SHIPPED | OUTPATIENT
Start: 2024-03-05 | End: 2024-03-07

## 2024-03-05 RX ORDER — NEBULIZER ACCESSORIES
1 KIT MISCELLANEOUS DAILY PRN
Qty: 1 KIT | Refills: 0 | Status: SHIPPED | OUTPATIENT
Start: 2024-03-05 | End: 2024-03-07

## 2024-03-05 RX ORDER — PREDNISONE 10 MG/1
TABLET ORAL
Qty: 21 EACH | Refills: 0 | Status: SHIPPED | OUTPATIENT
Start: 2024-03-05 | End: 2024-03-07

## 2024-03-05 ASSESSMENT — PAIN DESCRIPTION - PAIN TYPE: TYPE: ACUTE PAIN

## 2024-03-05 ASSESSMENT — PAIN DESCRIPTION - ORIENTATION: ORIENTATION: MID

## 2024-03-05 ASSESSMENT — PAIN DESCRIPTION - LOCATION: LOCATION: CHEST

## 2024-03-05 ASSESSMENT — PAIN SCALES - GENERAL: PAINLEVEL_OUTOF10: 10

## 2024-03-05 ASSESSMENT — PAIN DESCRIPTION - DESCRIPTORS: DESCRIPTORS: ACHING

## 2024-03-05 ASSESSMENT — PAIN - FUNCTIONAL ASSESSMENT: PAIN_FUNCTIONAL_ASSESSMENT: 0-10

## 2024-03-06 NOTE — ED NOTES
Patient  given copy of dc instructions and 5 script(s). Patient verbalized understanding of instructions and script (s). Patient given a current medication reconciliation form and verbalized understanding of their medications. Patient verbalized understanding of the importance of discussing medications with his or her physician or clinic they will be following up with. Patient alert and oriented and in no acute distress. Patient discharged home ambulatory.

## 2024-03-06 NOTE — ED PROVIDER NOTES
Combo    Collection Time: 03/05/24  7:47 PM    Specimen: Nasopharyngeal   Result Value Ref Range    SARS-CoV-2, PCR Not detected NOTD      Rapid Influenza A By PCR Not detected NOTD      Rapid Influenza B By PCR Not detected NOTD     EKG 12 Lead    Collection Time: 03/05/24  8:16 PM   Result Value Ref Range    Ventricular Rate 72 BPM    Atrial Rate 72 BPM    P-R Interval 158 ms    QRS Duration 82 ms    Q-T Interval 404 ms    QTc Calculation (Bazett) 442 ms    P Axis 38 degrees    R Axis 23 degrees    T Axis 25 degrees    Diagnosis       Normal sinus rhythm  When compared with ECG of 24-JAN-2024 14:23,  No significant change was found     POC Pregnancy Urine Qual    Collection Time: 03/05/24  8:51 PM   Result Value Ref Range    Preg Test, Ur Negative NEG           EKG: When ordered, EKG's are interpreted by the Emergency Department Physician in the absence of a cardiologist.  Please see their note for interpretation of EKG.      RADIOLOGY:  Non-plain film images such as CT, Ultrasound and MRI are read by the radiologist. Plain radiographic images are visualized and preliminarily interpreted by the ED Provider with the below findings:        Interpretation per the Radiologist below, if available at the time of this note:     XR CHEST PORTABLE    Result Date: 3/5/2024  INDICATION: cough  EXAM:  AP CHEST RADIOGRAPH COMPARISON: January 24, 2024 FINDINGS: AP portable view of the chest demonstrates a normal cardiomediastinal silhouette. There is no edema, effusion, consolidation, or pneumothorax. The osseous structures are unremarkable.     No acute process.      PROCEDURES   Unless otherwise noted below, none  Procedures     CRITICAL CARE TIME   Patient does not meet Critical Care Time, 0 minutes     EMERGENCY DEPARTMENT COURSE and DIFFERENTIAL DIAGNOSIS/MDM   Vitals:    Vitals:    03/05/24 1949 03/05/24 1951   BP:  (!) 167/100   Pulse: 81    Resp: 20    Temp: 98.6 °F (37 °C)    TempSrc: Oral    SpO2: 98%    Weight: 85.3    norethindrone 0.35 MG tablet  Commonly known as: Ortho Micronor  Take 1 tablet by mouth daily     nystatin 697414 UNIT/GM cream  Commonly known as: MYCOSTATIN     nystatin-triamcinolone 804654-3.1 UNIT/GM-% cream  Commonly known as: MYCOLOG II  Apply topically 2 times daily.     OXcarbazepine 300 MG tablet  Commonly known as: TRILEPTAL     sertraline 100 MG tablet  Commonly known as: ZOLOFT     Spiriva HandiHaler 18 MCG inhalation capsule  Generic drug: tiotropium     triamcinolone 0.025 % cream  Commonly known as: KENALOG               Where to Get Your Medications        These medications were sent to [facility] #[id number] - [place], [place] - [address] - P [phone] - F [phone]  [address] [address]      Hours: 24-hours Phone: [phone]   albuterol (2.5 MG/3ML) 0.083% nebulizer solution  albuterol sulfate  (90 Base) MCG/ACT inhaler  fluticasone 50 MCG/ACT nasal spray  guaiFENesin-codeine 100-10 MG/5ML syrup  Nebulizer/Tubing/Mouthpiece Kit  predniSONE 10 MG (21) Tbpk           DISCONTINUED MEDICATIONS:  Current Discharge Medication List        STOP taking these medications       Fluticasone Furoate (FLONASE SENSIMIST NA) Comments:   Reason for Stopping:             I have seen and evaluated the patient autonomously. My supervision physician was on site and available for consultation if needed.     I am the Primary Clinician of Record.   Tayler Howard PA-C (electronically signed)    (Please note that parts of this dictation were completed with voice recognition software. Quite often unanticipated grammatical, syntax, homophones, and other interpretive errors are inadvertently transcribed by the computer software. Please disregards these errors. Please excuse any errors that have escaped final proofreading.)       Tayler Howard PA-C  03/05/24 1631

## 2024-03-06 NOTE — ED NOTES
Pt presents to ED with c/o sore throat, nasal congestion, productive cough with associated back & chest discomfort ongoing for 2 weeks. Pt reports taking safe tussin for sx with no relief. Pt is A&Ox4, RR even & unlabored, skin is warm & dry. Pt in NAD, updated on plan of care, call light within reach.      Emergency Department Nursing Plan of Care       The Nursing Plan of Care is developed from the Nursing assessment and Emergency Department Attending provider initial evaluation.  The plan of care may be reviewed in the “ED Provider note”.    The Plan of Care was developed with the following considerations:   Patient / Family readiness to learn indicated by:verbalized understanding  Persons(s) to be included in education: patient  Barriers to Learning/Limitations: No    Signed     Diana Diaz RN    3/6/2024   1:30 AM

## 2024-03-07 LAB
EKG ATRIAL RATE: 72 BPM
EKG DIAGNOSIS: NORMAL
EKG P AXIS: 38 DEGREES
EKG P-R INTERVAL: 158 MS
EKG Q-T INTERVAL: 404 MS
EKG QRS DURATION: 82 MS
EKG QTC CALCULATION (BAZETT): 442 MS
EKG R AXIS: 23 DEGREES
EKG T AXIS: 25 DEGREES
EKG VENTRICULAR RATE: 72 BPM

## 2024-03-07 PROCEDURE — 93010 ELECTROCARDIOGRAM REPORT: CPT | Performed by: SPECIALIST

## 2024-03-07 RX ORDER — PREDNISONE 10 MG/1
TABLET ORAL
Qty: 21 EACH | Refills: 0 | Status: SHIPPED | OUTPATIENT
Start: 2024-03-07

## 2024-03-07 RX ORDER — CODEINE PHOSPHATE/GUAIFENESIN 10-100MG/5
5 LIQUID (ML) ORAL 3 TIMES DAILY PRN
Qty: 45 ML | Refills: 0 | Status: SHIPPED | OUTPATIENT
Start: 2024-03-07 | End: 2024-03-10

## 2024-03-07 RX ORDER — ALBUTEROL SULFATE 90 UG/1
2 AEROSOL, METERED RESPIRATORY (INHALATION) EVERY 4 HOURS PRN
Qty: 18 G | Refills: 0 | Status: SHIPPED | OUTPATIENT
Start: 2024-03-07

## 2024-03-07 RX ORDER — FLUTICASONE PROPIONATE 50 MCG
2 SPRAY, SUSPENSION (ML) NASAL DAILY
Qty: 16 G | Refills: 0 | Status: SHIPPED | OUTPATIENT
Start: 2024-03-07

## 2024-03-07 RX ORDER — NEBULIZER ACCESSORIES
1 KIT MISCELLANEOUS DAILY PRN
Qty: 1 KIT | Refills: 0 | Status: SHIPPED | OUTPATIENT
Start: 2024-03-07

## 2024-03-07 RX ORDER — ALBUTEROL SULFATE 2.5 MG/3ML
2.5 SOLUTION RESPIRATORY (INHALATION) EVERY 4 HOURS PRN
Qty: 120 EACH | Refills: 0 | Status: SHIPPED | OUTPATIENT
Start: 2024-03-07

## 2024-04-24 ENCOUNTER — HOSPITAL ENCOUNTER (EMERGENCY)
Facility: HOSPITAL | Age: 39
Discharge: HOME | End: 2024-04-24
Payer: MEDICARE

## 2024-04-24 ENCOUNTER — APPOINTMENT (OUTPATIENT)
Facility: HOSPITAL | Age: 39
End: 2024-04-24
Payer: MEDICARE

## 2024-04-24 VITALS
BODY MASS INDEX: 32.96 KG/M2 | DIASTOLIC BLOOD PRESSURE: 91 MMHG | SYSTOLIC BLOOD PRESSURE: 163 MMHG | WEIGHT: 186 LBS | RESPIRATION RATE: 18 BRPM | HEART RATE: 87 BPM | OXYGEN SATURATION: 99 % | HEIGHT: 63 IN | TEMPERATURE: 98.3 F

## 2024-04-24 DIAGNOSIS — S46.911A STRAIN OF RIGHT SHOULDER, INITIAL ENCOUNTER: ICD-10-CM

## 2024-04-24 DIAGNOSIS — S40.021A CONTUSION OF MULTIPLE SITES OF RIGHT SHOULDER AND UPPER ARM, INITIAL ENCOUNTER: Primary | ICD-10-CM

## 2024-04-24 DIAGNOSIS — S40.011A CONTUSION OF MULTIPLE SITES OF RIGHT SHOULDER AND UPPER ARM, INITIAL ENCOUNTER: Primary | ICD-10-CM

## 2024-04-24 PROCEDURE — 6370000000 HC RX 637 (ALT 250 FOR IP): Performed by: PHYSICIAN ASSISTANT

## 2024-04-24 PROCEDURE — 73030 X-RAY EXAM OF SHOULDER: CPT

## 2024-04-24 PROCEDURE — 99283 EMERGENCY DEPT VISIT LOW MDM: CPT

## 2024-04-24 RX ORDER — LIDOCAINE 4 G/G
1 PATCH TOPICAL
Status: DISCONTINUED | OUTPATIENT
Start: 2024-04-24 | End: 2024-04-24 | Stop reason: HOSPADM

## 2024-04-24 RX ORDER — LIDOCAINE 50 MG/G
1 PATCH TOPICAL DAILY
Qty: 10 PATCH | Refills: 0 | Status: SHIPPED | OUTPATIENT
Start: 2024-04-24 | End: 2024-04-25

## 2024-04-24 ASSESSMENT — PAIN - FUNCTIONAL ASSESSMENT: PAIN_FUNCTIONAL_ASSESSMENT: 0-10

## 2024-04-24 ASSESSMENT — ENCOUNTER SYMPTOMS
VOMITING: 0
ABDOMINAL PAIN: 0
DIARRHEA: 0
CHEST TIGHTNESS: 0
EYE PAIN: 0
COUGH: 0
RHINORRHEA: 0
SHORTNESS OF BREATH: 0
BACK PAIN: 0
SORE THROAT: 0

## 2024-04-24 ASSESSMENT — PAIN SCALES - GENERAL
PAINLEVEL_OUTOF10: 10
PAINLEVEL_OUTOF10: 3

## 2024-04-24 ASSESSMENT — PAIN DESCRIPTION - DESCRIPTORS: DESCRIPTORS: ACHING

## 2024-04-24 ASSESSMENT — PAIN DESCRIPTION - LOCATION
LOCATION: SHOULDER
LOCATION: SHOULDER

## 2024-04-24 ASSESSMENT — PAIN DESCRIPTION - ORIENTATION
ORIENTATION: RIGHT
ORIENTATION: RIGHT

## 2024-04-24 NOTE — PROGRESS NOTES
time discussing both pharmacological and nonpharmacological treatment and preventative care measures for optimizing cardiovascular health and wellness. This includes, but is not limited to: obtaining regular physical activity as tolerated, achieving ideal weight and blood pressure, healthy eating habits, smoking cessation, limiting or eliminating alcohol intake, and avoidance of recreational drug use.    I have emphasized the importance of adherence to the current medication regimen as well as routine follow up for preventative care measures.      Riccardo Mckeon NP  [facility]  [address] [place] [address], [address], [facility], [address]  P: [phone]  F: [phone]

## 2024-04-24 NOTE — ED NOTES
Discharge instructions were given to the patient by Lesley.     The patient left the Emergency Department alert and oriented and in no acute distress with 1 prescriptions. The patient was encouraged to call or return to the ED for worsening issues or problems and was encouraged to schedule a follow up appointment for continuing care.     Ambulation assessment completed before discharge.  Pt left Emergency Department ambulating at baseline with no ortho devices  Ortho device education: none    The patient verbalized understanding of discharge instructions and prescriptions, all questions were answered. The patient has no further concerns at this time.
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
## 2024-04-24 NOTE — ED TRIAGE NOTES
Pt reports right shoulder pain x 2 weeks, injured it twice in that time.  Pt reports taking advil, aleve, and tylenol without relief.

## 2024-04-25 ENCOUNTER — OFFICE VISIT (OUTPATIENT)
Age: 39
End: 2024-04-25
Payer: MEDICARE

## 2024-04-25 VITALS
WEIGHT: 188 LBS | HEART RATE: 79 BPM | SYSTOLIC BLOOD PRESSURE: 128 MMHG | BODY MASS INDEX: 33.31 KG/M2 | HEIGHT: 63 IN | DIASTOLIC BLOOD PRESSURE: 78 MMHG | OXYGEN SATURATION: 100 %

## 2024-04-25 DIAGNOSIS — I51.89 DIASTOLIC DYSFUNCTION WITHOUT HEART FAILURE: ICD-10-CM

## 2024-04-25 DIAGNOSIS — R00.2 PALPITATIONS: ICD-10-CM

## 2024-04-25 DIAGNOSIS — R07.89 CHEST PAIN, ATYPICAL: ICD-10-CM

## 2024-04-25 DIAGNOSIS — I10 HYPERTENSION, UNCONTROLLED: Primary | ICD-10-CM

## 2024-04-25 PROCEDURE — 3074F SYST BP LT 130 MM HG: CPT | Performed by: NURSE PRACTITIONER

## 2024-04-25 PROCEDURE — 3078F DIAST BP <80 MM HG: CPT | Performed by: NURSE PRACTITIONER

## 2024-04-25 PROCEDURE — 99213 OFFICE O/P EST LOW 20 MIN: CPT | Performed by: NURSE PRACTITIONER

## 2024-04-25 RX ORDER — HYDROCHLOROTHIAZIDE 12.5 MG/1
12.5 TABLET ORAL DAILY
Qty: 90 TABLET | Refills: 3 | Status: SHIPPED | OUTPATIENT
Start: 2024-04-25

## 2024-04-25 RX ORDER — AMLODIPINE BESYLATE 5 MG/1
5 TABLET ORAL DAILY
Qty: 90 TABLET | Refills: 3 | Status: SHIPPED | OUTPATIENT
Start: 2024-04-25

## 2024-04-25 RX ORDER — FLUTICASONE PROPIONATE AND SALMETEROL 250; 50 UG/1; UG/1
POWDER RESPIRATORY (INHALATION)
COMMUNITY
Start: 2024-03-08

## 2024-04-25 ASSESSMENT — VISUAL ACUITY: OU: 1

## 2024-05-12 ENCOUNTER — APPOINTMENT (OUTPATIENT)
Facility: HOSPITAL | Age: 39
End: 2024-05-12
Payer: MEDICARE

## 2024-05-12 ENCOUNTER — HOSPITAL ENCOUNTER (EMERGENCY)
Facility: HOSPITAL | Age: 39
Discharge: HOME | End: 2024-05-13
Payer: MEDICARE
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
## 2024-05-12 DIAGNOSIS — J01.90 ACUTE SINUSITIS, RECURRENCE NOT SPECIFIED, UNSPECIFIED LOCATION: ICD-10-CM

## 2024-05-12 DIAGNOSIS — S40.021A CONTUSION OF RIGHT UPPER ARM, INITIAL ENCOUNTER: ICD-10-CM

## 2024-05-12 PROCEDURE — 6360000002 HC RX W HCPCS

## 2024-05-12 PROCEDURE — 70450 CT HEAD/BRAIN W/O DYE: CPT

## 2024-05-12 PROCEDURE — 73562 X-RAY EXAM OF KNEE 3: CPT

## 2024-05-12 PROCEDURE — 71250 CT THORAX DX C-: CPT

## 2024-05-12 PROCEDURE — 99282 EMERGENCY DEPT VISIT SF MDM: CPT

## 2024-05-12 PROCEDURE — 96374 THER/PROPH/DIAG INJ IV PUSH: CPT

## 2024-05-12 PROCEDURE — 4500000002 HC ER NO CHARGE

## 2024-05-12 PROCEDURE — 72125 CT NECK SPINE W/O DYE: CPT

## 2024-05-12 PROCEDURE — 73590 X-RAY EXAM OF LOWER LEG: CPT

## 2024-05-12 RX ORDER — LORAZEPAM 2 MG/ML
INJECTION INTRAMUSCULAR
Status: COMPLETED
Start: 2024-05-12 | End: 2024-05-12

## 2024-05-12 RX ORDER — LORAZEPAM 2 MG/ML
1 INJECTION INTRAMUSCULAR ONCE
Status: COMPLETED | OUTPATIENT
Start: 2024-05-12 | End: 2024-05-12

## 2024-05-12 RX ADMIN — LORAZEPAM 1 MG: 2 INJECTION INTRAMUSCULAR; INTRAVENOUS at 22:18

## 2024-05-12 RX ADMIN — LORAZEPAM 1 MG: 2 INJECTION INTRAMUSCULAR at 22:18

## 2024-05-12 ASSESSMENT — LIFESTYLE VARIABLES
HOW OFTEN DO YOU HAVE A DRINK CONTAINING ALCOHOL: NEVER
HOW MANY STANDARD DRINKS CONTAINING ALCOHOL DO YOU HAVE ON A TYPICAL DAY: PATIENT DOES NOT DRINK

## 2024-05-12 ASSESSMENT — PAIN DESCRIPTION - ORIENTATION: ORIENTATION: RIGHT

## 2024-05-12 ASSESSMENT — PAIN SCALES - GENERAL: PAINLEVEL_OUTOF10: 10

## 2024-05-12 ASSESSMENT — PAIN DESCRIPTION - LOCATION: LOCATION: FLANK

## 2024-05-12 ASSESSMENT — PAIN - FUNCTIONAL ASSESSMENT: PAIN_FUNCTIONAL_ASSESSMENT: 0-10

## 2024-05-13 VITALS
TEMPERATURE: 98.3 F | RESPIRATION RATE: 17 BRPM | HEART RATE: 84 BPM | OXYGEN SATURATION: 100 % | SYSTOLIC BLOOD PRESSURE: 173 MMHG | DIASTOLIC BLOOD PRESSURE: 98 MMHG

## 2024-05-13 RX ORDER — FLUTICASONE PROPIONATE 50 MCG
2 SPRAY, SUSPENSION (ML) NASAL DAILY
Qty: 16 G | Refills: 0 | Status: SHIPPED | OUTPATIENT
Start: 2024-05-13
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
## 2024-05-13 NOTE — ED NOTES
Xray was at bedside and came out of pt's room to inform RN that pt is having a tremor episode. NP was notified and RN went to assess pt. Pt was experiencing a panic-like episode.      Pt states, \"It feels like an elephant is sitting on my chest.\" EKG was completed. Upon auscultation, bilateral clear lung sounds.
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
## 2024-05-13 NOTE — ED NOTES
Discharge instructions were given to the patient by Juliet MCDANIEL.     The patient left the Emergency Department alert and oriented and in no acute distress with 1 prescriptions. The patient was encouraged to call or return to the ED for worsening issues or problems and was encouraged to schedule a follow up appointment for continuing care.     Ambulation assessment completed before discharge.  Pt left Emergency Department ambulating at baseline with no ortho devices  Ortho device education: none    The patient verbalized understanding of discharge instructions and prescriptions, all questions were answered. The patient has no further concerns at this time.

## 2024-05-20 ENCOUNTER — OFFICE VISIT (OUTPATIENT)
Age: 39
End: 2024-05-20
Payer: MEDICARE

## 2024-05-20 VITALS
RESPIRATION RATE: 18 BRPM | HEART RATE: 70 BPM | DIASTOLIC BLOOD PRESSURE: 104 MMHG | BODY MASS INDEX: 33.13 KG/M2 | WEIGHT: 187 LBS | SYSTOLIC BLOOD PRESSURE: 170 MMHG | OXYGEN SATURATION: 99 % | HEIGHT: 63 IN

## 2024-05-20 DIAGNOSIS — G44.319 ACUTE POST-TRAUMATIC HEADACHE, NOT INTRACTABLE: ICD-10-CM

## 2024-05-20 DIAGNOSIS — F19.10 SUBSTANCE ABUSE (HCC): ICD-10-CM

## 2024-05-20 DIAGNOSIS — G40.909 SEIZURE DISORDER (HCC): Primary | ICD-10-CM

## 2024-05-20 PROCEDURE — 3080F DIAST BP >= 90 MM HG: CPT | Performed by: NURSE PRACTITIONER

## 2024-05-20 PROCEDURE — 99214 OFFICE O/P EST MOD 30 MIN: CPT | Performed by: NURSE PRACTITIONER

## 2024-05-20 PROCEDURE — 3077F SYST BP >= 140 MM HG: CPT | Performed by: NURSE PRACTITIONER

## 2024-05-20 RX ORDER — OXCARBAZEPINE 150 MG/1
150 TABLET, FILM COATED ORAL 2 TIMES DAILY
Qty: 60 TABLET | Refills: 5 | Status: SHIPPED | OUTPATIENT
Start: 2024-05-20 | End: 2024-05-22

## 2024-05-20 ASSESSMENT — PATIENT HEALTH QUESTIONNAIRE - PHQ9
SUM OF ALL RESPONSES TO PHQ QUESTIONS 1-9: 0
1. LITTLE INTEREST OR PLEASURE IN DOING THINGS: SEVERAL DAYS
SUM OF ALL RESPONSES TO PHQ QUESTIONS 1-9: 0
SUM OF ALL RESPONSES TO PHQ QUESTIONS 1-9: 0
SUM OF ALL RESPONSES TO PHQ QUESTIONS 1-9: 2
1. LITTLE INTEREST OR PLEASURE IN DOING THINGS: NOT AT ALL
SUM OF ALL RESPONSES TO PHQ QUESTIONS 1-9: 2
SUM OF ALL RESPONSES TO PHQ QUESTIONS 1-9: 0
SUM OF ALL RESPONSES TO PHQ QUESTIONS 1-9: 2
2. FEELING DOWN, DEPRESSED OR HOPELESS: NOT AT ALL
SUM OF ALL RESPONSES TO PHQ9 QUESTIONS 1 & 2: 2
SUM OF ALL RESPONSES TO PHQ9 QUESTIONS 1 & 2: 0
SUM OF ALL RESPONSES TO PHQ QUESTIONS 1-9: 2
2. FEELING DOWN, DEPRESSED OR HOPELESS: SEVERAL DAYS

## 2024-05-20 NOTE — PROGRESS NOTES
Chief Complaint   Patient presents with    Seizures     Follow up - most recent was a silent seizure last months      1. Have you been to the ER, urgent care clinic since your last visit?  Hospitalized since your last visit? Yes multiple times see in Epic     2. Have you seen or consulted any other health care providers outside of the [facility] since your last visit?  Include any pap smears or colon screening.  Yes PCP

## 2024-05-20 NOTE — PROGRESS NOTES
[facility]  [address]  [address]  [place] [address]  Tel: [phone]  Fax: [phone]      Date:  24     Name:  JOE FREEMAN  :  1985  MRN:  [id number]     PCP:  Zaida Bentley MD    Chief Complaint   Patient presents with    Seizures     Follow up - most recent was a silent seizure last months        HISTORY OF PRESENT ILLNESS:  Patient presents today for regular follow-up, notable history of seizures.  Previously seen 2021 with Dr. Cristobal.  Last seizure: Patient notes she has these episodes nearly daily.  Her mother is very concerned that she is having \"silent seizures\". The only symptom that she is aware of, she will be alert and oriented but her eyes will gaze off/zoned out and she will Foam in the corner of her mouth.  She can come right out of it at times but if something is going on like increased stress, it takes her longer to come around.  She does feel as though provoking factors being irritated/being stressed..    Sometimes she will seizures where she does shake, seemed like it happened about 1 month ago. She woke up from it, youngest daughter was talking to her, she was able to mumble. Could have lasted about 5 minutes.  She is supposed to be taking Trileptal 300mg daily, last dose was several months ago.  Patient is requesting refills.  She is still seeing Psychiatry,she was in the crisis program, possible hallucinations. Lives in [facility].  Saw Cardiology recently  Driving: No  EEG: Unknown last one.  Head CT and cervical CT completed while in the emergency room last month.  Last labs: While in the emergency room.  Alcohol: Rare  Patient does smoke up to a pack of cigarettes a day.  Marijuana as needed  Cocaine use: Last use yesterday.    Recap from last visit : 2021:  Assessment and Plan  1. Seizures  Continue tegretol     2. Hypertension  Continue HCTZ  Needs to bring her BP down.      3. Dizziness  May be

## 2024-05-22 ENCOUNTER — HOSPITAL ENCOUNTER (EMERGENCY)
Facility: HOSPITAL | Age: 39
Discharge: HOME | End: 2024-05-22
Payer: MEDICARE

## 2024-05-22 ENCOUNTER — APPOINTMENT (OUTPATIENT)
Facility: HOSPITAL | Age: 39
End: 2024-05-22
Payer: MEDICARE

## 2024-05-22 ENCOUNTER — OFFICE VISIT (OUTPATIENT)
Age: 39
End: 2024-05-22
Payer: MEDICARE

## 2024-05-22 VITALS
HEART RATE: 85 BPM | SYSTOLIC BLOOD PRESSURE: 168 MMHG | OXYGEN SATURATION: 100 % | TEMPERATURE: 98.1 F | DIASTOLIC BLOOD PRESSURE: 97 MMHG | RESPIRATION RATE: 19 BRPM

## 2024-05-22 VITALS — WEIGHT: 187 LBS | DIASTOLIC BLOOD PRESSURE: 100 MMHG | SYSTOLIC BLOOD PRESSURE: 168 MMHG | BODY MASS INDEX: 33.13 KG/M2

## 2024-05-22 DIAGNOSIS — R05.3 PERSISTENT COUGH: ICD-10-CM

## 2024-05-22 DIAGNOSIS — R93.5 ABNORMAL ULTRASOUND OF OVARY: Primary | ICD-10-CM

## 2024-05-22 DIAGNOSIS — N83.209 CYST OF OVARY, UNSPECIFIED LATERALITY: ICD-10-CM

## 2024-05-22 DIAGNOSIS — J01.00 ACUTE NON-RECURRENT MAXILLARY SINUSITIS: Primary | ICD-10-CM

## 2024-05-22 LAB
ALBUMIN SERPL-MCNC: 3.6 G/DL (ref 3.5–5)
ALBUMIN/GLOB SERPL: 0.9 (ref 1.1–2.2)
ALP SERPL-CCNC: 64 U/L (ref 45–117)
ALT SERPL-CCNC: 19 U/L (ref 12–78)
ANION GAP SERPL CALC-SCNC: 6 MMOL/L (ref 5–15)
AST SERPL-CCNC: 14 U/L (ref 15–37)
BASOPHILS # BLD: 0 K/UL (ref 0–0.1)
BASOPHILS NFR BLD: 1 % (ref 0–1)
BILIRUB SERPL-MCNC: 0.2 MG/DL (ref 0.2–1)
BUN SERPL-MCNC: 17 MG/DL (ref 6–20)
BUN/CREAT SERPL: 18 (ref 12–20)
CALCIUM SERPL-MCNC: 9 MG/DL (ref 8.5–10.1)
CHLORIDE SERPL-SCNC: 101 MMOL/L (ref 97–108)
CO2 SERPL-SCNC: 33 MMOL/L (ref 21–32)
CREAT SERPL-MCNC: 0.96 MG/DL (ref 0.55–1.02)
DIFFERENTIAL METHOD BLD: ABNORMAL
EOSINOPHIL # BLD: 0.1 K/UL (ref 0–0.4)
EOSINOPHIL NFR BLD: 3 % (ref 0–7)
ERYTHROCYTE [DISTWIDTH] IN BLOOD BY AUTOMATED COUNT: 15 % (ref 11.5–14.5)
GLOBULIN SER CALC-MCNC: 3.9 G/DL (ref 2–4)
GLUCOSE SERPL-MCNC: 72 MG/DL (ref 65–100)
HCG UR QL: NEGATIVE
HCT VFR BLD AUTO: 34.9 % (ref 35–47)
HGB BLD-MCNC: 11.3 G/DL (ref 11.5–16)
IMM GRANULOCYTES # BLD AUTO: 0 K/UL (ref 0–0.04)
IMM GRANULOCYTES NFR BLD AUTO: 0 % (ref 0–0.5)
LYMPHOCYTES # BLD: 1.5 K/UL (ref 0.8–3.5)
LYMPHOCYTES NFR BLD: 35 % (ref 12–49)
MCH RBC QN AUTO: 27.2 PG (ref 26–34)
MCHC RBC AUTO-ENTMCNC: 32.4 G/DL (ref 30–36.5)
MCV RBC AUTO: 84.1 FL (ref 80–99)
MONOCYTES # BLD: 0.4 K/UL (ref 0–1)
MONOCYTES NFR BLD: 9 % (ref 5–13)
NEUTS SEG # BLD: 2.2 K/UL (ref 1.8–8)
NEUTS SEG NFR BLD: 52 % (ref 32–75)
NRBC # BLD: 0 K/UL (ref 0–0.01)
NRBC BLD-RTO: 0 PER 100 WBC
NT PRO BNP: 20 PG/ML (ref 0–125)
PLATELET # BLD AUTO: 353 K/UL (ref 150–400)
PMV BLD AUTO: 8.9 FL (ref 8.9–12.9)
POTASSIUM SERPL-SCNC: 3.8 MMOL/L (ref 3.5–5.1)
PROT SERPL-MCNC: 7.5 G/DL (ref 6.4–8.2)
RBC # BLD AUTO: 4.15 M/UL (ref 3.8–5.2)
SODIUM SERPL-SCNC: 140 MMOL/L (ref 136–145)
TROPONIN I SERPL HS-MCNC: 5 NG/L (ref 0–51)
WBC # BLD AUTO: 4.3 K/UL (ref 3.6–11)

## 2024-05-22 PROCEDURE — 85025 COMPLETE CBC W/AUTO DIFF WBC: CPT

## 2024-05-22 PROCEDURE — 3080F DIAST BP >= 90 MM HG: CPT | Performed by: OBSTETRICS & GYNECOLOGY

## 2024-05-22 PROCEDURE — 84484 ASSAY OF TROPONIN QUANT: CPT

## 2024-05-22 PROCEDURE — 83880 ASSAY OF NATRIURETIC PEPTIDE: CPT

## 2024-05-22 PROCEDURE — 93005 ELECTROCARDIOGRAM TRACING: CPT | Performed by: PHYSICIAN ASSISTANT

## 2024-05-22 PROCEDURE — 81025 URINE PREGNANCY TEST: CPT

## 2024-05-22 PROCEDURE — 36415 COLL VENOUS BLD VENIPUNCTURE: CPT

## 2024-05-22 PROCEDURE — 99285 EMERGENCY DEPT VISIT HI MDM: CPT

## 2024-05-22 PROCEDURE — 99213 OFFICE O/P EST LOW 20 MIN: CPT | Performed by: OBSTETRICS & GYNECOLOGY

## 2024-05-22 PROCEDURE — 3077F SYST BP >= 140 MM HG: CPT | Performed by: OBSTETRICS & GYNECOLOGY

## 2024-05-22 PROCEDURE — 71045 X-RAY EXAM CHEST 1 VIEW: CPT

## 2024-05-22 PROCEDURE — 80053 COMPREHEN METABOLIC PANEL: CPT

## 2024-05-22 RX ORDER — CEFIXIME 400 MG/1
400 CAPSULE ORAL DAILY
Qty: 10 CAPSULE | Refills: 0 | Status: SHIPPED | OUTPATIENT
Start: 2024-05-22

## 2024-05-22 RX ORDER — BENZONATATE 200 MG/1
200 CAPSULE ORAL 3 TIMES DAILY PRN
Qty: 12 CAPSULE | Refills: 0 | Status: SHIPPED | OUTPATIENT
Start: 2024-05-22

## 2024-05-22 ASSESSMENT — ENCOUNTER SYMPTOMS
WHEEZING: 0
ABDOMINAL DISTENTION: 0
ANAL BLEEDING: 0
COUGH: 1
FACIAL SWELLING: 0
SHORTNESS OF BREATH: 0
SINUS PRESSURE: 1
APNEA: 0
CHEST TIGHTNESS: 0
ABDOMINAL PAIN: 0
SINUS PAIN: 0
CHOKING: 0
VOMITING: 0
RHINORRHEA: 0
EYE PAIN: 0
BLOOD IN STOOL: 0
STRIDOR: 0
NAUSEA: 0
VOICE CHANGE: 0
TROUBLE SWALLOWING: 0
DIARRHEA: 0
CONSTIPATION: 0
SORE THROAT: 0
BACK PAIN: 0
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
## 2024-05-22 NOTE — ED NOTES
Pt presents to ED complaining of cough for about a month that gets worsen. Patient  verbalized that she's been coughing hard and cause her to have chest pain and back pain and throat discomfort like there's mucus stock in her throat. Pt states she is sweaty and feel warm, and been having a yellowish watery stool when she pass out gas. Pt is alert and oriented x 4, RR even and unlabored, skin is warm and dry. Assesment completed and pt updated on plan of care.       Emergency Department Nursing Plan of Care       The Nursing Plan of Care is developed from the Nursing assessment and Emergency Department Attending provider initial evaluation.  The plan of care may be reviewed in the “ED Provider note”.    The Plan of Care was developed with the following considerations:   Presenting ambulatory assessment: Ambulating at baseline  Patient / Family readiness to learn indicated by: verbalized understanding  Persons(s) to be included in education: patient   Barriers to Learning/Limitations: None    Signed     KORINA WHEELER RN    5/22/2024   1:43 PM

## 2024-05-22 NOTE — ED TRIAGE NOTES
Patient report productive cough x2 months. She states that after her coughing attacks she get severe CP that radiates to her back on her right side.

## 2024-05-22 NOTE — PROGRESS NOTES
Elidia Varner is a 39 y.o. female presents for a follow up visit.    Chief Complaint   Patient presents with    Follow-up     Ultrasound follow up     Patient's last menstrual period was 05/16/2024 (approximate).  Birth Control: OCP (estrogen/progesterone).  Last Pap: see report obtained 09/07/23.    The patient is reporting having: no problems.  U/s follow up.  BP elevated.  Pt states she just started back taking her bp meds today.         1. Have you been to the ER, urgent care clinic, or hospitalized since your last visit? Yes    2. Have you seen or consulted any other health care providers outside of the [facility] since your last visit? No      
  hydroCHLOROthiazide 12.5 MG tablet Take 1 tablet by mouth daily 4/25/24  Yes Riccardo Mckeon APRN - NP   amLODIPine (NORVASC) 5 MG tablet Take 1 tablet by mouth daily 4/25/24  Yes Riccardo Mckeon APRN - NP   diclofenac sodium (VOLTAREN) 1 % GEL Apply 2 g topically 4 times daily as needed for Pain 4/24/24  Yes Noemí Lan PA-C   albuterol sulfate HFA (PROVENTIL;VENTOLIN;PROAIR) 108 (90 Base) MCG/ACT inhaler Inhale 2 puffs into the lungs every 4 hours as needed for Wheezing or Shortness of Breath 3/7/24  Yes Aguila Mena PA-C   ALPRAZolam (XANAX) 1 MG tablet Take 1 tablet by mouth nightly as needed for Sleep.   Yes Johnnie Aleman MD   lamoTRIgine (LAMICTAL) 150 MG tablet Take 1 tablet by mouth daily   Yes Johnnie Aleman MD   aspirin 325 MG tablet Take 1 tablet by mouth daily   Yes Johnnie Aleman MD   norethindrone (ORTHO MICRONOR) 0.35 MG tablet Take 1 tablet by mouth daily 2/14/24  Yes Yoshi Gray MD   SPIRIVA HANDIHALER 18 MCG inhalation capsule  1/8/24  Yes ProviderJohnnie MD   nystatin (MYCOSTATIN) 848684 UNIT/GM cream Apply topically 2 times daily Apply topically 2 times daily.   Yes ProviderJohnnie MD   triamcinolone (KENALOG) 0.025 % cream Apply topically every 4 hours as needed Apply Topically   Yes ProviderJohnnie MD   OXcarbazepine (TRILEPTAL) 300 MG tablet Take 1 tablet by mouth daily 8/24/22  Yes Automatic Reconciliation, Ar        Review of Systems - History obtained from the patient    Constitutional: negative for weight loss, fever, night sweats  CV: negative for chest pain, palpitations, edema  Resp: negative for cough, shortness of breath, wheezing  GI: negative for change in bowel habits, abdominal pain, black or bloody stools  : negative for frequency, dysuria, hematuria  MSK: negative for back pain, joint pain, muscle pain  Skin: negative for itching, rash, hives  Neuro: negative for dizziness, headache, confusion, weakness  Psych: negative

## 2024-05-22 NOTE — ED NOTES
Discharge instructions were given to the patient by SHANNA STAHL RN.     The patient left the Emergency Department alert and oriented and in no acute distress with 2 prescriptions. The patient was encouraged to call or return to the ED for worsening issues or problems and was encouraged to schedule a follow up appointment for continuing care.     Ambulation assessment completed before discharge.  Pt left Emergency Department ambulating at baseline with no ortho devices  Ortho device education: none    The patient verbalized understanding of discharge instructions and prescriptions, all questions were answered. The patient has no further concerns at this time.

## 2024-05-23 LAB
EKG ATRIAL RATE: 67 BPM
EKG DIAGNOSIS: NORMAL
EKG P AXIS: 44 DEGREES
EKG P-R INTERVAL: 158 MS
EKG Q-T INTERVAL: 404 MS
EKG QRS DURATION: 82 MS
EKG QTC CALCULATION (BAZETT): 426 MS
EKG R AXIS: 15 DEGREES
EKG T AXIS: 9 DEGREES
EKG VENTRICULAR RATE: 67 BPM

## 2024-05-23 PROCEDURE — 93010 ELECTROCARDIOGRAM REPORT: CPT | Performed by: SPECIALIST

## 2024-06-04 ENCOUNTER — TELEPHONE (OUTPATIENT)
Age: 39
End: 2024-06-04

## 2024-06-04 NOTE — TELEPHONE ENCOUNTER
[facility]Scheduling Dept has schedule the patient's MRI for 6/27;however the pt is allergic to the contract and per the nurse pt would have to have primidone and benadryl as a protocol Nurse Butler can be reached at [phone]

## 2024-06-27 ENCOUNTER — HOSPITAL ENCOUNTER (OUTPATIENT)
Facility: HOSPITAL | Age: 39
End: 2024-06-27
Payer: MEDICARE

## 2024-06-27 ENCOUNTER — HOSPITAL ENCOUNTER (OUTPATIENT)
Facility: HOSPITAL | Age: 39
Discharge: HOME | End: 2024-06-27
Payer: MEDICARE

## 2024-06-27 DIAGNOSIS — G44.319 ACUTE POST-TRAUMATIC HEADACHE, NOT INTRACTABLE: ICD-10-CM

## 2024-06-27 DIAGNOSIS — G40.909 SEIZURE DISORDER (HCC): ICD-10-CM

## 2024-06-27 PROCEDURE — 70553 MRI BRAIN STEM W/O & W/DYE: CPT

## 2024-06-27 PROCEDURE — 6360000004 HC RX CONTRAST MEDICATION: Performed by: NURSE PRACTITIONER

## 2024-06-27 PROCEDURE — 95714 VEEG EA 12-26 HR UNMNTR: CPT

## 2024-06-27 PROCEDURE — A9579 GAD-BASE MR CONTRAST NOS,1ML: HCPCS | Performed by: NURSE PRACTITIONER

## 2024-06-27 RX ADMIN — GADOTERIDOL 15 ML: 279.3 INJECTION, SOLUTION INTRAVENOUS at 13:24

## 2024-06-28 PROBLEM — R41.82 ACUTE ALTERATION IN MENTAL STATUS: Status: ACTIVE | Noted: 2024-06-28

## 2024-06-28 PROCEDURE — 95719 EEG PHYS/QHP EA INCR W/O VID: CPT | Performed by: PSYCHIATRY & NEUROLOGY

## 2024-06-28 NOTE — PROCEDURES
[address]  [address]                                   EEG      PATIENT NAME: JOE FREEMAN            : 1985  MED REC NO: [id number]                       ROOM:   ACCOUNT NO: [id number]                       ADMIT DATE: 2024  PROVIDER: Jesse Villa MD    DATE OF SERVICE:  2024    REFERRING PHYSICIAN:  DELORES BENNETT    TYPE OF STUDY:  24-hour ambulatory EEG recording.    DATE OF STUDY:  2024 to 2024.    CLINICAL INDICATION:  The patient is a female with history of seizures, uncontrollable epilepsy.  The patient is having staring spells, eyes movements.  EEG to rule out seizures, to rule out cortical abnormality.    EEG CLASSIFICATION:  The patient has essentially normal awake 24-hour ambulatory EEG recording.    DESCRIPTION OF RECORD:  This is a 16-channel prolonged 24-hour EEG recording on patient to rule out possible seizures.  This study began on 2024 at approximately noon.  It ended on 2024 at approximately 8:15 a.m. for a total time of study of 20 hours and 50 minutes.  During this time, the patient had a posteriorly located occipital alpha rhythm of 9-10 Hz that did attenuate some with eye opening.  There was some mild movement muscle and electrode artifacts seen in the recording, but the study was technically a good quality and very interpretable.  The patient did not perform hyperventilation.  The patient did not perform photic stimulation.  The patient did enter a prolonged state of sleep in the evening hours with K-complexes and sleep spindles seen in central head regions.  During the recording, there were no clear areas of focal slowing, no clear spike or spike-and-wave discharges, and no recorded electrographic or dysrhythmic spells of any type seen.  At the time of this dictation, the patient has not returned her clinical diary.    INTERPRETATION:  This is essentially normal

## 2024-07-11 ENCOUNTER — HOSPITAL ENCOUNTER (INPATIENT)
Facility: HOSPITAL | Age: 39
LOS: 1 days | Discharge: HOME | End: 2024-07-12
Attending: STUDENT IN AN ORGANIZED HEALTH CARE EDUCATION/TRAINING PROGRAM | Admitting: PSYCHIATRY & NEUROLOGY
Payer: MEDICARE

## 2024-07-11 ENCOUNTER — APPOINTMENT (OUTPATIENT)
Facility: HOSPITAL | Age: 39
End: 2024-07-11
Payer: MEDICARE

## 2024-07-11 DIAGNOSIS — R45.851 SUICIDAL IDEATION: Primary | ICD-10-CM

## 2024-07-11 LAB
ALBUMIN SERPL-MCNC: 4 G/DL (ref 3.5–5)
ALBUMIN/GLOB SERPL: 1 (ref 1.1–2.2)
ALP SERPL-CCNC: 69 U/L (ref 45–117)
ALT SERPL-CCNC: 44 U/L (ref 12–78)
ANION GAP SERPL CALC-SCNC: 6 MMOL/L (ref 5–15)
APAP SERPL-MCNC: <2 UG/ML (ref 10–30)
AST SERPL-CCNC: 32 U/L (ref 15–37)
BASOPHILS # BLD: 0 K/UL (ref 0–0.1)
BASOPHILS NFR BLD: 1 % (ref 0–1)
BILIRUB SERPL-MCNC: 0.3 MG/DL (ref 0.2–1)
BUN SERPL-MCNC: 9 MG/DL (ref 6–20)
BUN/CREAT SERPL: 11 (ref 12–20)
CALCIUM SERPL-MCNC: 9.1 MG/DL (ref 8.5–10.1)
CHLORIDE SERPL-SCNC: 106 MMOL/L (ref 97–108)
CO2 SERPL-SCNC: 27 MMOL/L (ref 21–32)
COMMENT:: NORMAL
CREAT SERPL-MCNC: 0.84 MG/DL (ref 0.55–1.02)
DIFFERENTIAL METHOD BLD: ABNORMAL
EKG ATRIAL RATE: 73 BPM
EKG DIAGNOSIS: NORMAL
EKG P AXIS: 41 DEGREES
EKG P-R INTERVAL: 152 MS
EKG Q-T INTERVAL: 402 MS
EKG QRS DURATION: 88 MS
EKG QTC CALCULATION (BAZETT): 442 MS
EKG R AXIS: 12 DEGREES
EKG T AXIS: 2 DEGREES
EKG VENTRICULAR RATE: 73 BPM
EOSINOPHIL # BLD: 0.2 K/UL (ref 0–0.4)
EOSINOPHIL NFR BLD: 4 % (ref 0–7)
ERYTHROCYTE [DISTWIDTH] IN BLOOD BY AUTOMATED COUNT: 15.9 % (ref 11.5–14.5)
ETHANOL SERPL-MCNC: <10 MG/DL (ref 0–0.08)
GLOBULIN SER CALC-MCNC: 3.9 G/DL (ref 2–4)
GLUCOSE SERPL-MCNC: 129 MG/DL (ref 65–100)
HCT VFR BLD AUTO: 34.5 % (ref 35–47)
HGB BLD-MCNC: 11.3 G/DL (ref 11.5–16)
IMM GRANULOCYTES # BLD AUTO: 0 K/UL (ref 0–0.04)
IMM GRANULOCYTES NFR BLD AUTO: 0 % (ref 0–0.5)
LYMPHOCYTES # BLD: 1.5 K/UL (ref 0.8–3.5)
LYMPHOCYTES NFR BLD: 40 % (ref 12–49)
MCH RBC QN AUTO: 26.8 PG (ref 26–34)
MCHC RBC AUTO-ENTMCNC: 32.8 G/DL (ref 30–36.5)
MCV RBC AUTO: 81.9 FL (ref 80–99)
MONOCYTES # BLD: 0.3 K/UL (ref 0–1)
MONOCYTES NFR BLD: 9 % (ref 5–13)
NEUTS SEG # BLD: 1.8 K/UL (ref 1.8–8)
NEUTS SEG NFR BLD: 46 % (ref 32–75)
NRBC # BLD: 0 K/UL (ref 0–0.01)
NRBC BLD-RTO: 0 PER 100 WBC
PLATELET # BLD AUTO: 251 K/UL (ref 150–400)
PMV BLD AUTO: 9.4 FL (ref 8.9–12.9)
POTASSIUM SERPL-SCNC: 3.2 MMOL/L (ref 3.5–5.1)
PROT SERPL-MCNC: 7.9 G/DL (ref 6.4–8.2)
RBC # BLD AUTO: 4.21 M/UL (ref 3.8–5.2)
SALICYLATES SERPL-MCNC: 4.2 MG/DL (ref 2.8–20)
SODIUM SERPL-SCNC: 139 MMOL/L (ref 136–145)
SPECIMEN HOLD: NORMAL
TROPONIN I SERPL HS-MCNC: 6 NG/L (ref 0–51)
WBC # BLD AUTO: 3.9 K/UL (ref 3.6–11)

## 2024-07-11 PROCEDURE — 80053 COMPREHEN METABOLIC PANEL: CPT

## 2024-07-11 PROCEDURE — 93005 ELECTROCARDIOGRAM TRACING: CPT

## 2024-07-11 PROCEDURE — 93005 ELECTROCARDIOGRAM TRACING: CPT | Performed by: STUDENT IN AN ORGANIZED HEALTH CARE EDUCATION/TRAINING PROGRAM

## 2024-07-11 PROCEDURE — 71045 X-RAY EXAM CHEST 1 VIEW: CPT

## 2024-07-11 PROCEDURE — 6360000002 HC RX W HCPCS: Performed by: STUDENT IN AN ORGANIZED HEALTH CARE EDUCATION/TRAINING PROGRAM

## 2024-07-11 PROCEDURE — 85025 COMPLETE CBC W/AUTO DIFF WBC: CPT

## 2024-07-11 PROCEDURE — 6370000000 HC RX 637 (ALT 250 FOR IP)

## 2024-07-11 PROCEDURE — 80143 DRUG ASSAY ACETAMINOPHEN: CPT

## 2024-07-11 PROCEDURE — 93010 ELECTROCARDIOGRAM REPORT: CPT | Performed by: SPECIALIST

## 2024-07-11 PROCEDURE — 80179 DRUG ASSAY SALICYLATE: CPT

## 2024-07-11 PROCEDURE — 6370000000 HC RX 637 (ALT 250 FOR IP): Performed by: STUDENT IN AN ORGANIZED HEALTH CARE EDUCATION/TRAINING PROGRAM

## 2024-07-11 PROCEDURE — 84484 ASSAY OF TROPONIN QUANT: CPT

## 2024-07-11 PROCEDURE — 36415 COLL VENOUS BLD VENIPUNCTURE: CPT

## 2024-07-11 PROCEDURE — 99285 EMERGENCY DEPT VISIT HI MDM: CPT

## 2024-07-11 PROCEDURE — 96374 THER/PROPH/DIAG INJ IV PUSH: CPT

## 2024-07-11 PROCEDURE — 82077 ASSAY SPEC XCP UR&BREATH IA: CPT

## 2024-07-11 PROCEDURE — 71046 X-RAY EXAM CHEST 2 VIEWS: CPT

## 2024-07-11 RX ORDER — IPRATROPIUM BROMIDE AND ALBUTEROL SULFATE 2.5; .5 MG/3ML; MG/3ML
1 SOLUTION RESPIRATORY (INHALATION)
Status: DISCONTINUED | OUTPATIENT
Start: 2024-07-11 | End: 2024-07-11

## 2024-07-11 RX ORDER — AMLODIPINE BESYLATE 5 MG/1
5 TABLET ORAL DAILY
Status: DISCONTINUED | OUTPATIENT
Start: 2024-07-11 | End: 2024-07-11

## 2024-07-11 RX ORDER — HYDROCHLOROTHIAZIDE 25 MG/1
12.5 TABLET ORAL DAILY
Status: DISCONTINUED | OUTPATIENT
Start: 2024-07-11 | End: 2024-07-11

## 2024-07-11 RX ORDER — POTASSIUM CHLORIDE 750 MG/1
30 TABLET, FILM COATED, EXTENDED RELEASE ORAL ONCE
Status: DISCONTINUED | OUTPATIENT
Start: 2024-07-11 | End: 2024-07-11

## 2024-07-11 RX ORDER — LORAZEPAM 2 MG/ML
1 INJECTION INTRAMUSCULAR ONCE
Status: DISCONTINUED | OUTPATIENT
Start: 2024-07-11 | End: 2024-07-11

## 2024-07-11 RX ORDER — LORAZEPAM 2 MG/ML
0.5 INJECTION INTRAMUSCULAR ONCE
Status: COMPLETED | OUTPATIENT
Start: 2024-07-11 | End: 2024-07-11

## 2024-07-11 RX ADMIN — LORAZEPAM 0.5 MG: 2 INJECTION, SOLUTION INTRAMUSCULAR; INTRAVENOUS at 20:50

## 2024-07-11 RX ADMIN — POTASSIUM BICARBONATE 40 MEQ: 782 TABLET, EFFERVESCENT ORAL at 15:08

## 2024-07-11 ASSESSMENT — HEART SCORE: ECG: NORMAL

## 2024-07-11 NOTE — ED TRIAGE NOTES
Presents with . Went to the  today to try to file a protective order against someone else. The order was denied and patient then began to have a panic attack and voicing suicidal ideation. History of hospitalizations for SI. Patient says she no longer has SI. +hallucinations per . Reports chest tightness for 2 months.   
No
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
## 2024-07-11 NOTE — BSMART NOTE
BSMART assessment completed, and suicide risk level noted to be HIGH due to ideation to burn herself to death and previous attempts via overdose as a teenager. Charge Nurse JONAS Cox and Physician ELYSIA Dent notified. Concerns not observed.

## 2024-07-11 NOTE — ED PROVIDER NOTES
[facility] EMERGENCY DEP  EMERGENCY DEPARTMENT ENCOUNTER      Pt Name: Elidia Varner  MRN: [id number]  Birthdate 1985  Date of evaluation: 7/11/2024  Provider: El Dent PA-C    CHIEF COMPLAINT       Chief Complaint   Patient presents with    Suicidal         HISTORY OF PRESENT ILLNESS   (Location/Symptom, Timing/Onset, Context/Setting, Quality, Duration, Modifying Factors, Severity)  Note limiting factors.   39-year-old female with a past medical history as below presents with complaint of mental health concern.   present with patient stating that they went to the [employer] today to file a protective order against someone who was making verbal threats.  Order was declined, causing the patient to start with panic attacks.  This morning, patient began making suicidal statements.  History of prior hospitalization for SI in February.  Patient denying SI at this time.  When asked about homicidal ideation, patient states \"I believe the fifth.\"  Patient denies hallucinations, however  states that she has been having both auditory and visual hallucinations.  Patient admits to some chest tightness going on for the past several months.  No other physical complaints at this time.            Review of External Medical Records:     Nursing Notes were reviewed.    REVIEW OF SYSTEMS    (2-9 systems for level 4, 10 or more for level 5)     Review of Systems    Except as noted above the remainder of the review of systems was reviewed and negative.       PAST MEDICAL HISTORY     Past Medical History:   Diagnosis Date    Anxiety     Arthritis     Asthma     albuterol inhailer prn     Bipolar disorder (HCC)     Chronic obstructive pulmonary disease (HCC)     Chronic pain     lower back    Depression     Diabetes mellitus (HCC) 2011    on insulin    Diastolic dysfunction     Disassociation disorder     DVT (deep venous thrombosis) (HCC)     right    Essential hypertension     on meds few years

## 2024-07-12 ENCOUNTER — CLINICAL DOCUMENTATION (OUTPATIENT)
Facility: HOSPITAL | Age: 39
End: 2024-07-12

## 2024-07-12 VITALS
HEART RATE: 69 BPM | DIASTOLIC BLOOD PRESSURE: 96 MMHG | SYSTOLIC BLOOD PRESSURE: 144 MMHG | OXYGEN SATURATION: 98 % | TEMPERATURE: 97.6 F | RESPIRATION RATE: 16 BRPM

## 2024-07-12 LAB
AMPHET UR QL SCN: NEGATIVE
APPEARANCE UR: ABNORMAL
BACTERIA URNS QL MICRO: NEGATIVE /HPF
BARBITURATES UR QL SCN: NEGATIVE
BENZODIAZ UR QL: NEGATIVE
BILIRUB UR QL: NEGATIVE
CANNABINOIDS UR QL SCN: POSITIVE
COCAINE UR QL SCN: NEGATIVE
COLOR UR: ABNORMAL
EKG ATRIAL RATE: 66 BPM
EKG DIAGNOSIS: NORMAL
EKG P AXIS: 42 DEGREES
EKG P-R INTERVAL: 146 MS
EKG Q-T INTERVAL: 400 MS
EKG QRS DURATION: 78 MS
EKG QTC CALCULATION (BAZETT): 419 MS
EKG R AXIS: 10 DEGREES
EKG T AXIS: 10 DEGREES
EKG VENTRICULAR RATE: 66 BPM
EPITH CASTS URNS QL MICRO: ABNORMAL /LPF
GLUCOSE UR STRIP.AUTO-MCNC: NEGATIVE MG/DL
HCG UR QL: NEGATIVE
HGB UR QL STRIP: NEGATIVE
KETONES UR QL STRIP.AUTO: NEGATIVE MG/DL
LEUKOCYTE ESTERASE UR QL STRIP.AUTO: NEGATIVE
Lab: ABNORMAL
METHADONE UR QL: NEGATIVE
NITRITE UR QL STRIP.AUTO: NEGATIVE
OPIATES UR QL: NEGATIVE
PCP UR QL: NEGATIVE
PH UR STRIP: 6 (ref 5–8)
PROT UR STRIP-MCNC: ABNORMAL MG/DL
RBC #/AREA URNS HPF: ABNORMAL /HPF (ref 0–5)
SP GR UR REFRACTOMETRY: 1.03 (ref 1–1.03)
TROPONIN I SERPL HS-MCNC: 5 NG/L (ref 0–51)
URINE CULTURE IF INDICATED: ABNORMAL
UROBILINOGEN UR QL STRIP.AUTO: 1 EU/DL (ref 0.2–1)
WBC URNS QL MICRO: ABNORMAL /HPF (ref 0–4)

## 2024-07-12 PROCEDURE — 6370000000 HC RX 637 (ALT 250 FOR IP): Performed by: STUDENT IN AN ORGANIZED HEALTH CARE EDUCATION/TRAINING PROGRAM

## 2024-07-12 PROCEDURE — 81001 URINALYSIS AUTO W/SCOPE: CPT

## 2024-07-12 PROCEDURE — 81025 URINE PREGNANCY TEST: CPT

## 2024-07-12 PROCEDURE — 84484 ASSAY OF TROPONIN QUANT: CPT

## 2024-07-12 PROCEDURE — 1240000000 HC EMOTIONAL WELLNESS R&B

## 2024-07-12 PROCEDURE — 80307 DRUG TEST PRSMV CHEM ANLYZR: CPT

## 2024-07-12 PROCEDURE — 36415 COLL VENOUS BLD VENIPUNCTURE: CPT

## 2024-07-12 PROCEDURE — 93010 ELECTROCARDIOGRAM REPORT: CPT | Performed by: SPECIALIST

## 2024-07-12 RX ORDER — TRAZODONE HYDROCHLORIDE 50 MG/1
50 TABLET ORAL NIGHTLY PRN
Status: CANCELLED | OUTPATIENT
Start: 2024-07-12

## 2024-07-12 RX ORDER — HALOPERIDOL 5 MG/1
5 TABLET ORAL EVERY 4 HOURS PRN
Status: CANCELLED | OUTPATIENT
Start: 2024-07-12

## 2024-07-12 RX ORDER — ALPRAZOLAM 0.5 MG/1
1 TABLET ORAL ONCE
Status: COMPLETED | OUTPATIENT
Start: 2024-07-12 | End: 2024-07-12

## 2024-07-12 RX ORDER — SENNOSIDES A AND B 8.6 MG/1
1 TABLET, FILM COATED ORAL DAILY PRN
Status: CANCELLED | OUTPATIENT
Start: 2024-07-12

## 2024-07-12 RX ORDER — HALOPERIDOL 5 MG/ML
5 INJECTION INTRAMUSCULAR EVERY 4 HOURS PRN
Status: CANCELLED | OUTPATIENT
Start: 2024-07-12

## 2024-07-12 RX ORDER — POLYETHYLENE GLYCOL 3350 17 G/17G
17 POWDER, FOR SOLUTION ORAL DAILY PRN
Status: CANCELLED | OUTPATIENT
Start: 2024-07-12

## 2024-07-12 RX ORDER — DIPHENHYDRAMINE HYDROCHLORIDE 50 MG/ML
50 INJECTION INTRAMUSCULAR; INTRAVENOUS EVERY 4 HOURS PRN
Status: CANCELLED | OUTPATIENT
Start: 2024-07-12

## 2024-07-12 RX ORDER — HYDROCHLOROTHIAZIDE 25 MG/1
12.5 TABLET ORAL DAILY
Status: DISCONTINUED | OUTPATIENT
Start: 2024-07-12 | End: 2024-07-12 | Stop reason: HOSPADM

## 2024-07-12 RX ORDER — ACETAMINOPHEN 325 MG/1
650 TABLET ORAL EVERY 4 HOURS PRN
Status: CANCELLED | OUTPATIENT
Start: 2024-07-12

## 2024-07-12 RX ORDER — HYDROXYZINE HYDROCHLORIDE 25 MG/1
50 TABLET, FILM COATED ORAL 3 TIMES DAILY PRN
Status: CANCELLED | OUTPATIENT
Start: 2024-07-12

## 2024-07-12 RX ORDER — MAGNESIUM HYDROXIDE/ALUMINUM HYDROXICE/SIMETHICONE 120; 1200; 1200 MG/30ML; MG/30ML; MG/30ML
30 SUSPENSION ORAL EVERY 6 HOURS PRN
Status: CANCELLED | OUTPATIENT
Start: 2024-07-12

## 2024-07-12 RX ADMIN — HYDROCHLOROTHIAZIDE 12.5 MG: 25 TABLET ORAL at 14:33

## 2024-07-12 RX ADMIN — ALPRAZOLAM 1 MG: 0.5 TABLET ORAL at 11:17

## 2024-07-12 ASSESSMENT — LIFESTYLE VARIABLES
HOW OFTEN DO YOU HAVE A DRINK CONTAINING ALCOHOL: 2-3 TIMES A WEEK
HOW MANY STANDARD DRINKS CONTAINING ALCOHOL DO YOU HAVE ON A TYPICAL DAY: 5 OR 6

## 2024-07-12 NOTE — ED PROVIDER NOTES
7:33 AM   Elidia Varner is a 39 y.o. female awaiting placement in a psychiatric facility with a diagnosis of   1. Suicidal ideation    . The patient was reexamined and remains clinically stable. All needs are being met at this time. All questions from the patient and/ or family were answered. The patient will continue to be reassessed intermittently until transfer.     Patient Vitals for the past 24 hrs:   BP Temp Temp src Pulse Resp SpO2   07/12/24 0045 -- -- -- 62 12 98 %   07/12/24 0000 -- -- -- 62 17 96 %   07/11/24 2330 -- -- -- 69 15 99 %   07/11/24 2300 -- -- -- 58 18 99 %   07/11/24 2230 -- -- -- 64 17 100 %   07/11/24 2200 -- -- -- 62 17 99 %   07/11/24 2130 (!) 152/94 -- -- 56 15 98 %   07/11/24 2100 (!) 166/109 -- -- 59 17 100 %   07/11/24 1756 137/85 -- -- 82 16 100 %   07/11/24 1359 (!) 180/97 98.6 °F (37 °C) Oral 86 20 100 %       Andrade Caputo DO    7:25 AM  Change of shift. Care of patient turned over to Dr. Toro; H&P reviewed, handoff complete.       Andrade Caputo,   07/12/24 0733

## 2024-07-12 NOTE — CONSULTS
[facility]  PSYCHIATRIC CONSULT NOTE:    Name: Elidia Varner  MR#: [id number]  : 1985  ACCOUNT#: [id number]  ADMIT DATE: 2024    REASON FOR CONSULT: BH Hold    HISTORY OF PRESENTING COMPLAINT:  Elidia Varner is a 39 y.o. female with PMH of asthma, atrial fibrillation, hypertension, DM, COPD, GERD, stroke, and seizures, currently admitted to the ED at [facility] for suicidal ideation and auditory hallucinations. The pt states her mood is currently \"good,\" though her affect is guarded and she is not forthcoming with answering assessment questions. She had an incident earlier in which a code [place] was called, when the pt took off her purewick and underwear with menstrual blood on them and attempted to throw these items. She denies SI/HI currently, but does endorse AH of her dead relatives. She clarifies these voices are not command hallucinations and feels they relate to her spiritual beliefs. She remains agreeable to inpatient psychiatric hospitalization.     PAST PSYCHIATRIC HISTORY: Hx of bipolar disorder, PTSD, and NAKIA. Hx of past psychiatric hospitalization at [facility] in 2023. Hx of suicide attempts as an adolescent by OD. She is followed by Ángel Zhang NP at [facility] and [facility] for case management. She has previously taken Zyprexa, Zoloft, Trileptal, and Abilify.     SUBSTANCE ABUSE HISTORY:  Hx of crack cocaine use - in recovery   Daily THC use   Tobacco     PSYCHOSOCIAL HISTORY: The pt is single, lives with a parent. Has 2 children, 11 and 14. No pending legal issues. Pt is on disability.     MENTAL STATUS EXAM:   39 y.o. female in moderate grooming, dressed in hospital gown, poorly engaged in evaluation.   Makes fair eye contact.  Psychomotor activity is WNL, no adventitious movements  Speech is normal in volume, tone, output and prosody   Mood is described as \"OK\"   Affect is guarded  Perception is notable for AH of  relatives    Thought process is goal directed  Thought content is negative for suicidal or homicidal ideation  Alert, awake and oriented in all spheres  Attention/Concentration are intact  Insight and judgment are poor    DIAGNOSTIC IMPRESSION:  Mood disorder NOS   NAKIA by hx  PTSD by hx    ASSESSMENT/PLAN:   -Continue 1:1 until admission to [facility]  -Pt has been accepted to the [facility] and will transfer when bed is available.     Thank you for the opportunity to participate in the care of your patient. Please re-consult the psychiatry service as needed.
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
## 2024-07-12 NOTE — BH NOTE
TRANSFER - IN REPORT:    Verbal report received from Tj MCDANIEL, on Elidia Varner  being received from [facility] ED for routine progression of care.    Report consisted of patient’s Situation, Background, Assessment and   Recommendations(SBAR).     Information from the following report(s) Nurse Handoff Report was reviewed with the receiving nurse.    Opportunity for questions and clarification was provided.      Assessment completed upon patient’s arrival to unit and care assume

## 2024-07-12 NOTE — ED NOTES
RN notified by safety sitter that patient is becoming increasingly anxious and C/O SOB. Patient examined and MD notified. EKG completed and patient connected to continuous telemetry monitoring. Ativan given.

## 2024-07-12 NOTE — BSMART NOTE
BSMART Liaison Team Note     LOS:  0     Patient goal(s) for today: take medications as prescribed, make needs known in an appropriate manner.  BSMART Liaison team focus/goals: assess MH needs, provide therapeutic support, brief therapy, and education, as needed.  Assist medical care management team with recommendations for coordination of care.    Psychiatric Consult: SI    Progress note: per triage, pt presents with . Went to the  today to try to file a protective order against someone else. The order was denied and patient then began to have a panic attack and voicing suicidal ideation. History of hospitalizations for SI. Patient says she no longer has SI. +hallucinations per . Reports chest tightness for 2 months     Liaison met with pt, FTJOAQUINA, in the ED - sitter at doorway.  Pt is alert, oriented, thoughts are concrete and tangential, affect is WNL, speech is pressured, pt is pleasant and cooperative.  Pt denies SI and when asked about HI, she laughingly responds \"I plead the 5th\".  When asked to clarify, pt lightheartedly reports having difficulties with her neighbors and deflects with humor regarding the details.  In response to AVH, pt reports she does not experience derogatory or commanding voices, but instead hears the voices of her dead relatives and also sees visions of them.  She reports this has been happening since she was a little girl.  Pt reports her mood is \"better now\" and states that earlier she was not in a good \"head space\".   Pt explains she was \"in a heightened state\" earlier this morning, due to her mental health, and reports feeling that her nurse did not try to understand her or calm her down.  Pt indicates there was a situation and she feels disrespected.  Pt is requesting to speak to a .  Charge nurse made aware.  Pt reports she came to [facility] because she always gets the help she needs here and is feeling misunderstood due to the situation this  morning with her nurse.  Pt deflects with humor when asked to clarify \"the situation\".  Refer to nursing notes from 7:30am, this morning, for details.  Liaison provided therapeutic support, validation of feelings, encouraged pt to remain calm and respectful of staff while we are trying to help her find a BHU bed.   Per pt, her , Mary Jo, is on her way to the ED to see her.  Liaison will continue to monitor and support.  BSMART Clinician was updated regarding pt's current pleasant mood and calm demeanor.      15:00: Checked in with pt, who has been calm and appropriate.  Pt reports she has not seen a , yet, and states that it is very important to her to be able to speak with one.  Pt voices no other needs or concerns, only that she is hopeful she will get a BHU bed soon.   Liaison updated pt's RN, Tj, who reports she will call the  to request visit.      Barriers to Discharge: BHU bed    Outpatient provider(s):  [facility]: DO Chakraborty and , Mary Jo Jay from [employer].      Insurance info/prescription coverage:  medicare and medicaid    Diagnosis: Hx of bipolar I d/o, PTSD, NAKIA, DID (per pt).    Plan:  BHU bed search.  Please refer to most recent psychiatric consult note and medical team for recommendations and disposition.     Follow up Psych Consult placed? Yes .   Psychiatrist updated? No      Participating treatment team members: Elidia Varner, ERIC Contreras LCSW BSMART ([facility]) Liaison  Available on [employer]

## 2024-07-12 NOTE — ED NOTES
Patient signed out to me by ACP at 8p  39 y.o. female here for behavioral health admit. Medically cleared.    8:45 PM I was asked to see pt for an acute change. I walked into room and pt tachypneic, clutching chest, spitting. Lungs are clear. Markedly hypertensive. Complaining of chest tightness on the L. Will obtain EKG, chest xray repeat, and repeat trop. She took her bp meds today. I do see she takes benzos so I will give dose of IV ativan in the event this could be withdrawal.     Her lungs are clear  Heart is regular and not tachycardic    When cxr was being performed her breathing slowed and she had no signs of distress. Unclear etiology at this time but will continue to monitor.    8:52 PM re-evaluated. Speaking in full sentences but still intermittently panting and tachypneic. Will reassess after ativan    Signed out to DR. Caputo at 9pm     James Hargrove DO  07/11/24 2052
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
## 2024-07-12 NOTE — ED NOTES
7:00 AM  Change of shift. Care of patient taken over from Dr. Caputo awaiting psychiatric placement.    3:00 PM  Patient signed out to oncoming provider pending placement.     Leigha Toro DO  07/12/24 1515

## 2024-07-12 NOTE — PROGRESS NOTES
Spiritual Care Assessment/Progress Note  [facility]    Name: Elidia Varner MRN: [id number]    Age: 39 y.o.     Sex: female   Language: English     Date: 7/12/2024            Total Time Calculated: 30 min              Spiritual Assessment begun in [facility] EMERGENCY DEP  Service Provided For: Patient  Referral/Consult From: Nurse  Encounter Overview/Reason: Initial Encounter    Spiritual beliefs:      [] Involved in a laura tradition/spiritual practice:      [] Supported by a laura community:      [] Claims no spiritual orientation:      [] Seeking spiritual identity:           [x] Adheres to an individual form of spirituality:      [] Not able to assess:                Identified resources for coping and support system:   Support System: Unknown       [] Prayer                  [] Devotional reading               [] Music                  [] Guided Imagery     [] Pet visits                                        [] Other: (COMMENT)     Specific area/focus of visit   Encounter:    Crisis:    Spiritual/Emotional needs: Type: Spiritual Distress, Emotional Distress  Ritual, Rites and Sacraments:    Grief, Loss, and Adjustments:    Ethics/Mediation:    Behavioral Health:    Palliative Care:    Advance Care Planning:      Plan/Referrals: Other (Comment) (Please forward all consults to spiritual health services.)    Narrative:     This was a page to provide spiritual health services to Elidia Varner Patient appeared to dealing with spiritual isolation as she expressed that she has been  from her children. I provided spiritual support by introducing supportive dialogue and providing empathetic presence.  Patient expressed gratitude.     Advised of  availability.     _____________________________  Sunshine Fajardo M.Div.   Chaplain Resident
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
## 2024-07-15 NOTE — DISCHARGE SUMMARY
in the HPI above and placed on Q15 minute checks and withdrawal precautions.    Per nursing report, upon admission to the U patient became upset and made several threats. She was discharged AMA upon approval of nursing director and covering provider. I wasn't there to evaluate the patient.     DISCHARGE DIAGNOSIS:  Mood d/o nos         Medication List        ASK your doctor about these medications      albuterol sulfate  (90 Base) MCG/ACT inhaler  Commonly known as: PROVENTIL;VENTOLIN;PROAIR  Inhale 2 puffs into the lungs every 4 hours as needed for Wheezing or Shortness of Breath     ALPRAZolam 1 MG tablet  Commonly known as: XANAX     amLODIPine 5 MG tablet  Commonly known as: NORVASC  Take 1 tablet by mouth daily     aspirin 325 MG tablet     benzonatate 200 MG capsule  Commonly known as: TESSALON  Take 1 capsule by mouth 3 times daily as needed for Cough     cefixime 400 MG Caps capsule  Commonly known as: SUPRAX  Take 1 capsule by mouth daily     diclofenac sodium 1 % Gel  Commonly known as: VOLTAREN  Apply 2 g topically 4 times daily as needed for Pain     fluticasone-salmeterol 250-50 MCG/ACT Aepb diskus inhaler  Commonly known as: ADVAIR     hydroCHLOROthiazide 12.5 MG tablet  Take 1 tablet by mouth daily     lamoTRIgine 150 MG tablet  Commonly known as: LAMICTAL     norethindrone 0.35 MG tablet  Commonly known as: Ortho Micronor  Take 1 tablet by mouth daily     nystatin 381063 UNIT/GM cream  Commonly known as: MYCOSTATIN     OXcarbazepine 300 MG tablet  Commonly known as: TRILEPTAL     Spiriva HandiHaler 18 MCG inhalation capsule  Generic drug: tiotropium     triamcinolone 0.025 % cream  Commonly known as: KENALOG               No results found for: \"VALAC\", \"VALP\", \"CARB2\"  No results found for: \"LITHM\"    [unfilled]  WOUND CARE: none needed.      PROGNOSIS:   Good / Fair based on nature of patient's pathology/ies and treatment compliance issues.  Prognosis is greatly dependent upon

## 2024-07-29 ENCOUNTER — OFFICE VISIT (OUTPATIENT)
Age: 39
End: 2024-07-29
Payer: MEDICARE

## 2024-07-29 VITALS
HEIGHT: 63 IN | SYSTOLIC BLOOD PRESSURE: 162 MMHG | BODY MASS INDEX: 33.49 KG/M2 | WEIGHT: 189 LBS | DIASTOLIC BLOOD PRESSURE: 100 MMHG

## 2024-07-29 DIAGNOSIS — K59.00 CONSTIPATION, UNSPECIFIED CONSTIPATION TYPE: ICD-10-CM

## 2024-07-29 DIAGNOSIS — I10 PRIMARY HYPERTENSION: ICD-10-CM

## 2024-07-29 DIAGNOSIS — N93.9 ABNORMAL UTERINE BLEEDING: ICD-10-CM

## 2024-07-29 DIAGNOSIS — N71.9 ENDOMETRITIS: Primary | ICD-10-CM

## 2024-07-29 DIAGNOSIS — R35.0 URINARY FREQUENCY: ICD-10-CM

## 2024-07-29 LAB
BILIRUBIN, URINE, POC: NEGATIVE
BLOOD URINE, POC: ABNORMAL
GLUCOSE URINE, POC: NEGATIVE
HCG, PREGNANCY, URINE, POC: NEGATIVE
KETONES, URINE, POC: NEGATIVE
LEUKOCYTE ESTERASE, URINE, POC: NEGATIVE
NITRITE, URINE, POC: NEGATIVE
PH, URINE, POC: 6 (ref 4.6–8)
PROTEIN,URINE, POC: ABNORMAL
SPECIFIC GRAVITY, URINE, POC: >=1.03 (ref 1–1.03)
URINALYSIS CLARITY, POC: CLEAR
URINALYSIS COLOR, POC: ABNORMAL
UROBILINOGEN, POC: NORMAL
VALID INTERNAL CONTROL, POC: YES

## 2024-07-29 PROCEDURE — 99213 OFFICE O/P EST LOW 20 MIN: CPT | Performed by: OBSTETRICS & GYNECOLOGY

## 2024-07-29 PROCEDURE — 3077F SYST BP >= 140 MM HG: CPT | Performed by: OBSTETRICS & GYNECOLOGY

## 2024-07-29 PROCEDURE — 3080F DIAST BP >= 90 MM HG: CPT | Performed by: OBSTETRICS & GYNECOLOGY

## 2024-07-29 PROCEDURE — 81001 URINALYSIS AUTO W/SCOPE: CPT | Performed by: OBSTETRICS & GYNECOLOGY

## 2024-07-29 PROCEDURE — 81025 URINE PREGNANCY TEST: CPT | Performed by: OBSTETRICS & GYNECOLOGY

## 2024-07-29 RX ORDER — ERGOCALCIFEROL 1.25 MG/1
CAPSULE ORAL
COMMUNITY

## 2024-07-29 RX ORDER — METRONIDAZOLE 500 MG/1
500 TABLET ORAL 2 TIMES DAILY
Qty: 28 TABLET | Refills: 0 | Status: SHIPPED | OUTPATIENT
Start: 2024-07-29 | End: 2024-08-12

## 2024-07-29 RX ORDER — DOXYCYCLINE HYCLATE 100 MG
100 TABLET ORAL 2 TIMES DAILY
Qty: 28 TABLET | Refills: 0 | Status: SHIPPED | OUTPATIENT
Start: 2024-07-29 | End: 2024-08-12

## 2024-07-29 RX ORDER — MEDROXYPROGESTERONE ACETATE 10 MG/1
10 TABLET ORAL DAILY
Qty: 21 TABLET | Refills: 0 | Status: SHIPPED | OUTPATIENT
Start: 2024-07-29

## 2024-07-29 RX ORDER — PSYLLIUM HUSK/CALCIUM CARB 1 G-60 MG
1 CAPSULE ORAL DAILY
Qty: 30 CAPSULE | Refills: 0 | Status: SHIPPED | OUTPATIENT
Start: 2024-07-29

## 2024-07-29 RX ORDER — AMLODIPINE BESYLATE 5 MG/1
5 TABLET ORAL DAILY
Qty: 90 TABLET | Refills: 3 | Status: SHIPPED | OUTPATIENT
Start: 2024-07-29

## 2024-07-29 NOTE — PROGRESS NOTES
Elidia Varner is a 39 y.o. female presents for a problem visit.    Chief Complaint   Patient presents with    Other     Abdominal pain and heavy vaginal bleedingx 1 week.  Second cycle this month     No LMP recorded. (Menstrual status: Irregular periods). Birth Control: none  Last Pap: 09/07/2023    The patient is reporting having: Pt unsure of LMP but states the bleeding she is having is her second cycle this month.  Bleeding and abdominal pain started about 1-2 weeks ago per patient.  Pt states she is not changing her pad frequently because she is not able to stay with her mom anymore.  Pt states she is \"displaced\".  Pt also c/o urinary frequency            1. Have you been to the ER, urgent care clinic, or hospitalized since your last visit? Yes    2. Have you seen or consulted any other health care providers outside of the [facility] since your last visit? No    Examination chaperoned by MELINDA LARKIN LPN.   
negative for chest pain, palpitations, edema  Resp: negative for cough, shortness of breath, wheezing  GI: negative for black or bloody stools  : negative for dysuria, hematuria  MSK: negative for back pain, joint pain, muscle pain  Skin: negative for itching, rash, hives  Neuro: negative for dizziness, headache, confusion, weakness  Psych: negative for anxiety, depression, change in mood  Heme/lymph: negative for bleeding, bruising, pallor      Objective:    BP (!) 162/100 (Site: Left Upper Arm, Position: Sitting)   Ht 1.6 m (5' 3\")   Wt 85.7 kg (189 lb)   BMI 33.48 kg/m²     OBGyn Exam     PHYSICAL EXAMINATION  Chaperone present    Constitutional  Appearance: well-nourished, obese, well developed, alert, in no acute distress    HENT  Head and Face: appears normal    Neck  Inspection/Palpation: normal appearance    Gastrointestinal  Abdominal Examination: abdomen non-tender to palpation, no masses present  Liver and spleen: no hepatomegaly present, spleen not palpable  Hernias: no hernias identified    Genitourinary  External Genitalia: normal appearance for age, no discharge present, no tenderness present, no inflammatory lesions present, no masses present, no atrophy present  Vagina: normal vaginal vault without defects, scant bloody discharge present, no inflammatory lesions present, no masses present  Bladder: non-tender to palpation  Urethra: appears normal  Cervix: normal appearing cervix with scant blood discharge present; tender to palpation of the cervix   Uterus: normal size, shape and consistency; tender to palpation of uterine fundus  Adnexa: no adnexal tenderness present, no adnexal masses present  Perineum: perineum within normal limits, no evidence of trauma, no rashes or skin lesions present  Anus: anus within normal limits, no hemorrhoids present  Inguinal Lymph Nodes: no lymphadenopathy present    Skin  General Inspection: no rash, no lesions identified    Neurologic/Psychiatric  Mental

## 2025-05-14 ENCOUNTER — TELEPHONE (OUTPATIENT)
Age: 40
End: 2025-05-14

## 2025-05-16 ENCOUNTER — TELEPHONE (OUTPATIENT)
Age: 40
End: 2025-05-16

## 2025-05-16 NOTE — TELEPHONE ENCOUNTER
Tried contacting patient to reschedule her appt on 06/05/2025 @ 1 pm. Provider will not be in office that afternoon due to scheduled procedure.     Telephone number ending in [phone] does not have a mailbox setup to leave message and the other number on file does not belong to the patient.     Sent [employer] message as well.

## (undated) DEVICE — SET SEALS HYSTEROSCOPE DISP -- MYOSURE  EA=10

## (undated) DEVICE — POUCH DRNGE FLX BND INTEGR RAIL CLMP DISP EZ CTCH

## (undated) DEVICE — X-RAY SPONGES,16 PLY: Brand: DERMACEA

## (undated) DEVICE — TRAY PREP DRY W/ PREM GLV 2 APPL 6 SPNG 2 UNDPD 1 OVERWRAP

## (undated) DEVICE — DEVON™ KNEE AND BODY STRAP 60" X 3" (1.5 M X 7.6 CM): Brand: DEVON

## (undated) DEVICE — TUBE ST FLD AQUILEX OUTFLO --

## (undated) DEVICE — CATH URETH INTMIT ROB 16FR FUN -- CONVERT TO ITEM 179520

## (undated) DEVICE — STERILE POLYISOPRENE POWDER-FREE SURGICAL GLOVES WITH EMOLLIENT COATING: Brand: PROTEXIS

## (undated) DEVICE — BASIC SINGLE BASIN BTC-LF: Brand: MEDLINE INDUSTRIES, INC.

## (undated) DEVICE — SPECIMEN SOCK - STANDARD: Brand: MEDI-VAC

## (undated) DEVICE — TELFA NON-ADHERENT PADS PREPAK: Brand: TELFA

## (undated) DEVICE — GOWN,SIRUS,NONRNF,SETINSLV,XL,20/CS: Brand: MEDLINE

## (undated) DEVICE — TOWEL SURG W17XL27IN STD BLU COT NONFENESTRATED PREWASHED

## (undated) DEVICE — SKIN MARKER,REGULAR TIP WITH RULER AND LABELS: Brand: DEVON

## (undated) DEVICE — CYSTOSCOPY PACK: Brand: CONVERTORS

## (undated) DEVICE — SOLUTION IRRIG 3000ML 0.9% SOD CHL FLX CONT 0797208] ICU MEDICAL INC]

## (undated) DEVICE — INFECTION CONTROL KIT SYS

## (undated) DEVICE — TUBE ST FLD CTRL AQUILEX INFLO --

## (undated) DEVICE — (D)DEVICE TISS REMOVAL -- SOLD AS BX/3 USE ITEM 335978

## (undated) DEVICE — HANDLE LT SNAP ON ULT DURABLE LENS FOR TRUMPF ALC DISPOSABLE

## (undated) DEVICE — KENDALL SCD EXPRESS SLEEVES, KNEE LENGTH, MEDIUM: Brand: KENDALL SCD

## (undated) DEVICE — PAD SANIT NPKN 4IN GRD